# Patient Record
Sex: MALE | Race: BLACK OR AFRICAN AMERICAN | Employment: FULL TIME | URBAN - METROPOLITAN AREA
[De-identification: names, ages, dates, MRNs, and addresses within clinical notes are randomized per-mention and may not be internally consistent; named-entity substitution may affect disease eponyms.]

---

## 2017-01-16 ENCOUNTER — HOSPITAL ENCOUNTER (OUTPATIENT)
Dept: GENERAL RADIOLOGY | Age: 57
Discharge: HOME | End: 2017-01-16
Payer: COMMERCIAL

## 2017-01-16 DIAGNOSIS — M25.559 PAIN IN JOINT, PELVIC REGION AND THIGH: ICD-10-CM

## 2017-01-16 DIAGNOSIS — R91.1 LUNG NODULE: ICD-10-CM

## 2017-01-16 PROCEDURE — 71020 XR CHEST PA LAT: CPT

## 2017-01-16 PROCEDURE — 72170 X-RAY EXAM OF PELVIS: CPT

## 2017-02-13 ENCOUNTER — OFFICE VISIT (OUTPATIENT)
Dept: CARDIOLOGY CLINIC | Age: 57
End: 2017-02-13

## 2017-02-13 VITALS
HEART RATE: 63 BPM | SYSTOLIC BLOOD PRESSURE: 110 MMHG | DIASTOLIC BLOOD PRESSURE: 80 MMHG | OXYGEN SATURATION: 98 % | BODY MASS INDEX: 23.99 KG/M2 | HEIGHT: 69 IN | WEIGHT: 162 LBS

## 2017-02-13 DIAGNOSIS — F17.200 TOBACCO USE DISORDER: ICD-10-CM

## 2017-02-13 DIAGNOSIS — F11.10 HEROIN ABUSE (HCC): ICD-10-CM

## 2017-02-13 DIAGNOSIS — I45.10 RBBB: ICD-10-CM

## 2017-02-13 DIAGNOSIS — R00.1 BRADYCARDIA: Primary | ICD-10-CM

## 2017-02-13 DIAGNOSIS — R07.9 CHEST PAIN, UNSPECIFIED: ICD-10-CM

## 2017-02-13 NOTE — PROGRESS NOTES
HISTORY OF PRESENT ILLNESS  Torres Garcia is a 64 y.o. male. HPI  He is referred to my office for the evaluation of bradycardia. He has been asymptomatic with regard to the bradycardia with no significant dizziness or fainting spell. There has been no documented severe bradycardia on his medical records sent from his primary care physicians office. He, however, had an echocardiogram at his primary care physicians office on 12/17/2015, which demonstrated normal LV function with EF in the range of 68%. There was no evidence of significant valvular pathology. Left atrial dimension was normal and so was the right atrial dimension. He is currently an every-day smoker. He does have an active history of substance abuse with heroin in the past thirty-five years. He indicates that he gets nauseated quite often (almost daily) when his heroin level goes down. He has had some chest pain, but he denies exertional pain. He has no history of hypertension or diabetes mellitus. He was seen at [facility] in December of 2015 with heroin withdrawal, at which time, he was found to have some bradycardia. He works at [employer] and he does have good muscles in his arms and chest.                   Review of Systems   Constitutional: Negative for malaise/fatigue and weight loss. HENT: Negative for hearing loss. Eyes: Negative for blurred vision and double vision. Respiratory: Positive for shortness of breath. Cardiovascular: Negative for chest pain, palpitations, orthopnea, claudication, leg swelling and PND. Gastrointestinal: Negative for blood in stool, heartburn and melena. Genitourinary: Negative for dysuria, flank pain, frequency, hematuria and urgency. Musculoskeletal: Negative for back pain and joint pain. Skin: Negative for itching and rash. Neurological: Positive for dizziness and loss of consciousness. Negative for weakness and headaches.    Psychiatric/Behavioral: Negative for depression and memory loss. Physical Exam   Constitutional: He is oriented to person, place, and time. He appears well-developed and well-nourished. HENT:   Head: Normocephalic and atraumatic. Eyes: Conjunctivae are normal. Pupils are equal, round, and reactive to light. Neck: Normal range of motion. Neck supple. No JVD present. Cardiovascular: Normal rate, regular rhythm, S1 normal and S2 normal.   No extrasystoles are present. PMI is not displaced. Exam reveals no friction rub. No murmur heard. Pulses:       Carotid pulses are 3+ on the right side, and 3+ on the left side. Pulmonary/Chest: Effort normal. He has no rales. Abdominal: Soft. There is no tenderness. Musculoskeletal: He exhibits no edema. Neurological: He is alert and oriented to person, place, and time. No cranial nerve deficit. Skin: Skin is warm and dry. Psychiatric: He has a normal mood and affect. His behavior is normal.     Visit Vitals    /80    Pulse 63    Ht 5' 9\" (1.753 m)    Wt 73.5 kg (162 lb)    SpO2 98%    BMI 23.92 kg/m2       Past Medical History   Diagnosis Date    Acid indigestion     Anemia NEC     Back pain     Cardiomegaly      1/12 LVH on EKG    Chest pain, unspecified     Drug use     Heroin causing adverse effect in therapeutic use     Irregular heart beat     Nonspecific abnormal electrocardiogram (ECG) (EKG)      S [place], RBBB    Pulmonary nodule     Tachycardia     Tightness in chest     Tiredness     Tobacco use disorder        Social History     Social History    Marital status: SINGLE     Spouse name: N/A    Number of children: N/A    Years of education: N/A     Occupational History    Not on file.      Social History Main Topics    Smoking status: Current Every Day Smoker     Packs/day: 1.00     Years: 34.00     Types: Cigarettes    Smokeless tobacco: Never Used    Alcohol use No      Comment: occasional    Drug use: Yes     Special: Marijuana, Heroin    Sexual activity: Not on file     Other Topics Concern    Not on file     Social History Narrative       Family History   Problem Relation Age of Onset    Diabetes Mother     Hypertension Mother     Thyroid Disease Mother     High Cholesterol Mother     Cancer Father     Heart Disease Father     Hypertension Father     Diabetes Sister     Hypertension Sister     Hypertension Brother     Hypertension Sister     High Cholesterol Sister        Past Surgical History   Procedure Laterality Date    Hx circumcision      Hx other surgical       HX TORSION OF THE TESTES    Hx circumcision      Hx other surgical  2007 approx     lung biopsy       Current Outpatient Prescriptions   Medication Sig Dispense Refill    ibuprofen (MOTRIN) 600 mg tablet Take 1 Tab by mouth every six (6) hours as needed for Pain. 30 Tab 0    buPROPion SR (WELLBUTRIN SR) 150 mg SR tablet 1 tablet daily for 3 days then 1 tablet twice daily 60 Tab 2    nicotine (NICODERM CQ) 21 mg/24 hr 1 Patch by TransDERmal route every twenty-four (24) hours.  sildenafil (REVATIO) 20 mg tablet Take 2-3 tablets at one time one hour prior to intercourse. 90 Tab 3       EKG: normal sinus rhythm, RBBB. ASSESSMENT and PLAN  Encounter Diagnoses   Name Primary?  Bradycardia Yes    Chest pain, unspecified     Tobacco use disorder     Heroin abuse     RBBB    He has no physical findings of structural heart disease and his echocardiogram demonstrated no evidence of structural heart disease. He does have right bundle branch block, which could be congenital or degenerative changes. He seems to have had intermittent sinus bradycardia, which may well be related to a high vagal tone since he has frequent episodes of nausea daily from his heroin addiction and temporary withdrawal.  Clinically, there appears to be no indication for a pacemaker at this time. The patient was given strong advice to stop using heroin and to stop smoking cigarettes.   By smoking cigarettes and using heroin, he will have a high risk for death and this was conveyed to the patient. He was advised to check into the hospital to come off of the heroin to avoid heroin withdrawal.  He has had nonspecific chest pain, but apparently, he has had no chest pain at work with heavy levels of activity.

## 2017-02-13 NOTE — MR AVS SNAPSHOT
Visit Information Date & Time Provider Department Dept. Phone Encounter #  
 2/13/2017  2:40 PM Clare Bustamante MD [facility] [id number] Follow-up Instructions Return if symptoms worsen or fail to improve. Upcoming Health Maintenance Date Due Hepatitis C Screening 1960 Pneumococcal 19-64 Medium Risk (1 of 1 - PPSV23) 12/25/1979 DTaP/Tdap/Td series (1 - Tdap) 12/25/1981 FOBT Q 1 YEAR AGE 50-75 12/25/2010 INFLUENZA AGE 9 TO ADULT 8/1/2016 Allergies as of 2/13/2017  Review Complete On: 2/13/2017 By: Clare Bustamante MD  
 Not on File Current Immunizations  Never Reviewed No immunizations on file. Not reviewed this visit You Were Diagnosed With   
  
 Codes Comments Bradycardia    -  Primary ICD-10-CM: R00.1 ICD-9-CM: 427.89 Vitals BP Pulse Height(growth percentile) Weight(growth percentile) SpO2 BMI  
 110/80 63 5' 9\" (1.753 m) 162 lb (73.5 kg) 98% 23.92 kg/m2 Smoking Status Current Every Day Smoker Vitals History BMI and BSA Data Body Mass Index Body Surface Area  
 23.92 kg/m 2 1.89 m 2 Preferred Pharmacy Pharmacy Name Phone [facility] [address] [place] [address],[facility] [address] [place] [address] [phone] Your Updated Medication List  
  
   
This list is accurate as of: 2/13/17  3:33 PM.  Always use your most recent med list.  
  
  
  
  
 buPROPion  mg SR tablet Commonly known as:  WELLBUTRIN SR  
1 tablet daily for 3 days then 1 tablet twice daily  
  
 ibuprofen 600 mg tablet Commonly known as:  MOTRIN Take 1 Tab by mouth every six (6) hours as needed for Pain. nicotine 21 mg/24 hr  
Commonly known as:  NICODERM CQ  
1 Patch by TransDERmal route every twenty-four (24) hours. sildenafil 20 mg tablet Commonly known as:  REVATIO Take 2-3 tablets at one time one hour prior to intercourse. We Performed the Following AMB POC EKG ROUTINE W/ 12 LEADS, INTER & REP [18895 CPT(R)] Follow-up Instructions Return if symptoms worsen or fail to improve. Introducing [facility]! Dear Alexandra Mccarthy: 
Thank you for requesting a LoveIt account. Our records indicate that you already have an active LoveIt account. You can access your account anytime at [email or web address] Did you know that you can access your hospital and ER discharge instructions at any time in LoveIt? You can also review all of your test results from your hospital stay or ER visit. Additional Information If you have questions, please visit the Frequently Asked Questions section of the LoveIt website at [email or web address] Remember, LoveIt is NOT to be used for urgent needs. For medical emergencies, dial 911. Now available from your iPhone and Android! Please provide this summary of care documentation to your next provider. Your primary care clinician is listed as Luis Manuel Meyers. If you have any questions after today's visit, please call [phone].

## 2017-02-13 NOTE — PROGRESS NOTES
1. Have you been to the ER, urgent care clinic since your last visit? Hospitalized since your last visit? Er 12/11/16 hip pain; ER 2/8/16 chest pain   2. Have you seen or consulted any other health care providers outside of the [address] [place] [address] since your last visit? Include any pap smears or colon screening.   no

## 2018-08-13 ENCOUNTER — HOSPITAL ENCOUNTER (EMERGENCY)
Age: 58
Discharge: HOME | End: 2018-08-14
Attending: EMERGENCY MEDICINE
Payer: COMMERCIAL

## 2018-08-13 VITALS
DIASTOLIC BLOOD PRESSURE: 83 MMHG | TEMPERATURE: 98.5 F | OXYGEN SATURATION: 96 % | RESPIRATION RATE: 18 BRPM | SYSTOLIC BLOOD PRESSURE: 136 MMHG | HEART RATE: 62 BPM

## 2018-08-13 DIAGNOSIS — F19.10 POLYSUBSTANCE ABUSE (HCC): Primary | ICD-10-CM

## 2018-08-13 LAB
ALBUMIN SERPL-MCNC: 2.9 G/DL (ref 3.4–5)
ALBUMIN/GLOB SERPL: 0.7 {RATIO} (ref 0.8–1.7)
ALP SERPL-CCNC: 67 U/L (ref 45–117)
ALT SERPL-CCNC: 27 U/L (ref 16–61)
ANION GAP SERPL CALC-SCNC: 5 MMOL/L (ref 3–18)
AST SERPL-CCNC: 26 U/L (ref 15–37)
BASOPHILS # BLD: 0 K/UL (ref 0–0.1)
BASOPHILS NFR BLD: 0 % (ref 0–2)
BILIRUB SERPL-MCNC: 0.5 MG/DL (ref 0.2–1)
BUN SERPL-MCNC: 12 MG/DL (ref 7–18)
BUN/CREAT SERPL: 8 (ref 12–20)
CALCIUM SERPL-MCNC: 8.4 MG/DL (ref 8.5–10.1)
CHLORIDE SERPL-SCNC: 105 MMOL/L (ref 100–108)
CO2 SERPL-SCNC: 32 MMOL/L (ref 21–32)
CREAT SERPL-MCNC: 1.53 MG/DL (ref 0.6–1.3)
DIFFERENTIAL METHOD BLD: ABNORMAL
EOSINOPHIL # BLD: 0.1 K/UL (ref 0–0.4)
EOSINOPHIL NFR BLD: 1 % (ref 0–5)
ERYTHROCYTE [DISTWIDTH] IN BLOOD BY AUTOMATED COUNT: 14.2 % (ref 11.6–14.5)
ETHANOL SERPL-MCNC: <3 MG/DL (ref 0–3)
GLOBULIN SER CALC-MCNC: 4.1 G/DL (ref 2–4)
GLUCOSE SERPL-MCNC: 105 MG/DL (ref 74–99)
HCT VFR BLD AUTO: 38.2 % (ref 36–48)
HGB BLD-MCNC: 12.2 G/DL (ref 13–16)
LYMPHOCYTES # BLD: 2.4 K/UL (ref 0.9–3.6)
LYMPHOCYTES NFR BLD: 39 % (ref 21–52)
MCH RBC QN AUTO: 26.5 PG (ref 24–34)
MCHC RBC AUTO-ENTMCNC: 31.9 G/DL (ref 31–37)
MCV RBC AUTO: 82.9 FL (ref 74–97)
MONOCYTES # BLD: 0.5 K/UL (ref 0.05–1.2)
MONOCYTES NFR BLD: 8 % (ref 3–10)
NEUTS SEG # BLD: 3.1 K/UL (ref 1.8–8)
NEUTS SEG NFR BLD: 52 % (ref 40–73)
PLATELET # BLD AUTO: 174 K/UL (ref 135–420)
PMV BLD AUTO: 12.1 FL (ref 9.2–11.8)
POTASSIUM SERPL-SCNC: 3.9 MMOL/L (ref 3.5–5.5)
PROT SERPL-MCNC: 7 G/DL (ref 6.4–8.2)
RBC # BLD AUTO: 4.61 M/UL (ref 4.7–5.5)
SODIUM SERPL-SCNC: 142 MMOL/L (ref 136–145)
WBC # BLD AUTO: 6.1 K/UL (ref 4.6–13.2)

## 2018-08-13 PROCEDURE — 99283 EMERGENCY DEPT VISIT LOW MDM: CPT

## 2018-08-13 PROCEDURE — 80053 COMPREHEN METABOLIC PANEL: CPT | Performed by: EMERGENCY MEDICINE

## 2018-08-13 PROCEDURE — 80307 DRUG TEST PRSMV CHEM ANLYZR: CPT | Performed by: EMERGENCY MEDICINE

## 2018-08-13 PROCEDURE — 85025 COMPLETE CBC W/AUTO DIFF WBC: CPT | Performed by: EMERGENCY MEDICINE

## 2018-08-13 NOTE — Clinical Note
Please follow up with outpatient detox resources as discussed. The evaluation and treatment done today requires that you follow up with a physician for re-evaluation. Medical problems can change over time and symptoms can get worse or new symptoms can  develop over time, therefore, it is important that you follow up as we discussed. Please immediately return to the ER if you have any concerns. Call the ER if you have any questions about what we discussed.

## 2018-08-14 NOTE — BSMART NOTE
Comprehensive Assessment Form Part 1      Section l - Integrated Summary    Summary:  62year old male who presented voluntarily to the ER seeking help with his drug addiction. Interviewed in room 21 @ the request of Dr. Larry Ventura. Patient sitting on ER bed with his common law wife at his side. Dressed in paper scrubs. Alert and oriented. Cooperative with interview. Patient reports he attends a Methadone program in [place] [facility] [place] [facility]. Taking 60 mg of Methadone daily. Ting Zavaleta will miss days as unable to afford, will then use a cap to cap and a half of heroin. Also admits to daily crack cocaine usage. Stated he had thoughts to take his life by doing drugs to excess. Currently shahzad for safety and is seeking information for Rehab treatment. Denied prior inpatient or outpatient treatment. Reports a decreased appetite over the past 2-3 months and weight loss of 20-30 pounds. Section ll - Disposition      The Medical Doctor to Psychiatrist conference was not completed. The Medical Doctor is in agreement with Psychiatrist disposition because of (reason) patient is denying current suicidal ideations, seeking information for Rehab treatment. .  The plan is discussed with Dr. Larry Ventura plan to discharge from the ER. Referred to outpatient given information packet.     Adela Bacon RN

## 2018-08-14 NOTE — DISCHARGE INSTRUCTIONS
Alcohol and Drug Problems: Care Instructions  Your Care Instructions    You can improve your life and health by stopping your use of alcohol or drugs. Ending dependency on alcohol or drugs may help you feel and sleep better. You may get along better with your family, friends, and coworkers. There are medicines and programs that can help. Follow-up care is a key part of your treatment and safety. Be sure to make and go to all appointments, and call your doctor if you are having problems. It's also a good idea to know your test results and keep a list of the medicines you take. How can you care for yourself at home? · If you have been given medicine to help keep you sober or reduce your cravings, be sure to take it as prescribed. · Talk to your doctor about programs that can help you stop using drugs or drinking alcohol. · If your doctor prescribes disulfiram (Antabuse), do not drink any alcohol while you are taking this medicine. You may have severe, even life-threatening, side effects from even small amounts of alcohol. · Do not tempt yourself by keeping alcohol or drugs in your home. · Learn how to say no when other people drink or use drugs. Or don't spend time with people who drink or use drugs. · Use the time and money spent on drinking or drugs to do something fun with your family or friends. Preventing a relapse  · Do not drink alcohol or use drugs at all. Using any amount of alcohol or drugs greatly increases your risk for relapse. · Seek help from organizations such as Alcoholics Anonymous, Narcotics Anonymous, or treatment facilities if you feel the need to drink alcohol or use drugs again. · Remember that recovery is a lifelong process. · Stay away from situations, friends, or places that may lead you to drink or use drugs. · Have a plan to spot and deal with relapse. Learn to recognize changes in your thinking that lead you to drink or use drugs. These are warning signs.  Get help before you start to drink or use drugs again. · Get help as soon as you can if you relapse. Some people make a plan with another person that outlines what they want that person to do for them if they relapse. The plan usually includes how to handle the relapse and who to notify in case of relapse. · Don't give up. Remember that a relapse does not mean that you have failed. Use the experience to learn the triggers that lead you to drink or use drugs. Then quit again. Many people have several relapses before they are able to quit for good. When should you call for help? Call 911 anytime you think you may need emergency care. For example, call if:    · You feel you cannot stop from hurting yourself or someone else.   [facility] your doctor now or seek immediate medical care if:    · You have serious withdrawal symptoms such as confusion, hallucinations, or severe trembling.    Watch closely for changes in your health, and be sure to contact your doctor if:    · You have a relapse.     · You need more help or support to stop. Where can you learn more? Go to http://kelly-james.info/. Enter 296-6935933 in the search box to learn more about \"Alcohol and Drug Problems: Care Instructions. \"  Current as of: October 9, 2017  Content Version: 11.7  © 6179-9788 [employer]. Care instructions adapted under license by [employer] (which disclaims liability or warranty for this information). If you have questions about a medical condition or this instruction, always ask your healthcare professional. [place] [address] any warranty or liability for your use of this information.

## 2018-08-14 NOTE — ED TRIAGE NOTES
Patient A/O x 4, complaining of having suicidal thoughts. Patient states he's also here for detox from heroine and crack cocaine. Patient states his last use of both drugs was today. Patient states his plan would be to keep taking heroine and crack. Denies HI.

## 2018-08-14 NOTE — ED PROVIDER NOTES
EMERGENCY DEPARTMENT HISTORY AND PHYSICAL EXAM    9:44 PM      Date: 8/13/2018  Patient Name: Aaron Parent    History of Presenting Illness     Chief Complaint   Patient presents with   [address] Problem       History Provided By: Patient    Chief Complaint: Detox  Duration:  Today  Timing:  Acute  Location: Endocrine/Psych  Quality: Not reported  Severity: Moderate  Modifying Factors: No modifying or aggravating factors were reported. Associated Symptoms: Depression      Additional History (Context): Aaron Parent is a 62 y.o. male with PMHx of drug use, tobacco use disorder, irregular heart beat and cardiomegaly who presents to the ED with request for acute detox today. Patient admits he has been been using cocaine and ~ 1 cap of heroin, last use today. States he is \"tired\" of spending money, \"I was doing the same thing a couple days ago, I'm just tired of it. \" Associated symptoms include depression. Denies a recent increase in stress. Claims he has a plan to end his life by ingesting drugs. Denies HI or A/V hallucinations. Patient reports he is on Methadone, took 60 mg this morning. No modifying or aggravating factors were reported. No other symptoms or concerns were expressed. PCP: Mckenna Almonte MD    Current Outpatient Prescriptions   Medication Sig Dispense Refill    ibuprofen (MOTRIN) 600 mg tablet Take 1 Tab by mouth every six (6) hours as needed for Pain. 30 Tab 0    buPROPion SR (WELLBUTRIN SR) 150 mg SR tablet 1 tablet daily for 3 days then 1 tablet twice daily 60 Tab 2    nicotine (NICODERM CQ) 21 mg/24 hr 1 Patch by TransDERmal route every twenty-four (24) hours.  sildenafil (REVATIO) 20 mg tablet Take 2-3 tablets at one time one hour prior to intercourse.  90 Tab 3       Past History     Past Medical History:  Past Medical History:   Diagnosis Date    Acid indigestion     Anemia NEC     Back pain     Cardiomegaly     1/12 LVH on EKG    Chest pain, unspecified     Drug use     Heroin causing adverse effect in therapeutic use     Irregular heart beat     Nonspecific abnormal electrocardiogram (ECG) (EKG)     S Ronak, RBBB    Pulmonary nodule     Tachycardia     Tightness in chest     Tiredness     Tobacco use disorder        Past Surgical History:  Past Surgical History:   Procedure Laterality Date    HX CIRCUMCISION      HX CIRCUMCISION      HX OTHER SURGICAL      HX TORSION OF THE TESTES    HX OTHER SURGICAL  2007 approx    lung biopsy       Family History:  Family History   Problem Relation Age of Onset    Diabetes Mother     Hypertension Mother     Thyroid Disease Mother     High Cholesterol Mother     Cancer Father     Heart Disease Father     Hypertension Father     Diabetes Sister     Hypertension Sister     Hypertension Brother     Hypertension Sister     High Cholesterol Sister        Social History:  Social History   Substance Use Topics    Smoking status: Current Every Day Smoker     Packs/day: 1.00     Years: 34.00     Types: Cigarettes    Smokeless tobacco: Never Used    Alcohol use No      Comment: occasional       Allergies:  No Known Allergies      Review of Systems       Review of Systems   Constitutional: Negative for fever. Endocrine:        Positive for detox   Psychiatric/Behavioral: Positive for dysphoric mood and suicidal ideas. Negative for hallucinations. All other systems reviewed and are negative. Physical Exam     Patient Vitals for the past 12 hrs:   Temp Pulse Resp BP SpO2   08/13/18 2119 98.5 °F (36.9 °C) 62 18 136/83 96 %       Physical Exam   Constitutional: He is oriented to person, place, and time. He appears well-developed. HENT:   Head: Normocephalic and atraumatic. Eyes: Conjunctivae and EOM are normal.   Neck: Normal range of motion. Cardiovascular: Normal heart sounds. Exam reveals no gallop and no friction rub. No murmur heard.   Pulmonary/Chest: Effort normal and breath sounds normal. No stridor. Abdominal: Soft. There is no tenderness. Musculoskeletal: Normal range of motion. He exhibits no tenderness. Neurological: He is alert and oriented to person, place, and time. Skin: Skin is warm and dry. He is not diaphoretic. Psychiatric: His behavior is normal. He exhibits a depressed mood. Nursing note and vitals reviewed. Diagnostic Study Results     Labs -  Recent Results (from the past 12 hour(s))   CBC WITH AUTOMATED DIFF    Collection Time: 08/13/18  9:38 PM   Result Value Ref Range    WBC 6.1 4.6 - 13.2 K/uL    RBC 4.61 (L) 4.70 - 5.50 M/uL    HGB 12.2 (L) 13.0 - 16.0 g/dL    HCT 38.2 36.0 - 48.0 %    MCV 82.9 74.0 - 97.0 FL    MCH 26.5 24.0 - 34.0 PG    MCHC 31.9 31.0 - 37.0 g/dL    RDW 14.2 11.6 - 14.5 %    PLATELET 050 714 - 455 K/uL    MPV 12.1 (H) 9.2 - 11.8 FL    NEUTROPHILS 52 40 - 73 %    LYMPHOCYTES 39 21 - 52 %    MONOCYTES 8 3 - 10 %    EOSINOPHILS 1 0 - 5 %    BASOPHILS 0 0 - 2 %    ABS. NEUTROPHILS 3.1 1.8 - 8.0 K/UL    ABS. LYMPHOCYTES 2.4 0.9 - 3.6 K/UL    ABS. MONOCYTES 0.5 0.05 - 1.2 K/UL    ABS. EOSINOPHILS 0.1 0.0 - 0.4 K/UL    ABS. BASOPHILS 0.0 0.0 - 0.1 K/UL    DF AUTOMATED     METABOLIC PANEL, COMPREHENSIVE    Collection Time: 08/13/18  9:38 PM   Result Value Ref Range    Sodium 142 136 - 145 mmol/L    Potassium 3.9 3.5 - 5.5 mmol/L    Chloride 105 100 - 108 mmol/L    CO2 32 21 - 32 mmol/L    Anion gap 5 3.0 - 18 mmol/L    Glucose 105 (H) 74 - 99 mg/dL    BUN 12 7.0 - 18 MG/DL    Creatinine 1.53 (H) 0.6 - 1.3 MG/DL    BUN/Creatinine ratio 8 (L) 12 - 20      GFR est AA 57 (L) >60 ml/min/1.73m2    GFR est non-AA 47 (L) >60 ml/min/1.73m2    Calcium 8.4 (L) 8.5 - 10.1 MG/DL    Bilirubin, total 0.5 0.2 - 1.0 MG/DL    ALT (SGPT) 27 16 - 61 U/L    AST (SGOT) 26 15 - 37 U/L    Alk.  phosphatase 67 45 - 117 U/L    Protein, total 7.0 6.4 - 8.2 g/dL    Albumin 2.9 (L) 3.4 - 5.0 g/dL    Globulin 4.1 (H) 2.0 - 4.0 g/dL    A-G Ratio 0.7 (L) 0.8 - 1.7     ETHYL ALCOHOL Collection Time: 08/13/18  9:38 PM   Result Value Ref Range    ALCOHOL(ETHYL),SERUM <3 0 - 3 MG/DL     Medical Decision Making     Provider Notes (Medical Decision Making): pt presenting requesting detox. He is medically cleared and I have consulted Mattie for additional recommendations. They have cleared him for outpt management as he currently does not have any inpt criteria. He has signed a safety contract with them, and accepts outpt resources for detox from crisis. -They verbally convey understanding and agreement of the signs, symptoms, diagnosis, treatment and prognosis and additionally agree to follow up as discussed. They understand that there are limitations to any evaluation, and that should the symptoms worsen or change that they need to return for further evaluation. All questions were answered, and we reviewed pertinent return precautions as seen in the discharge paperwork. Pt understood follow up instructions, and would return to the ED if any worsening or concerns. Clark Lema MD  11:23 PM      I am the first provider for this patient. I reviewed the vital signs, available nursing notes, past medical history, past surgical history, family history and social history. Vital Signs-Reviewed the patient's vital signs. Pulse Oximetry Analysis -  96% on room air, stable    Cardiac Monitor:  Rate: 62  Rhythm:  Normal Sinus Rhythm     Records Reviewed: Nursing Notes and Old Medical Records (Time of Review: 9:44 PM)    ED Course: Progress Notes, Reevaluation, and Consults:  Consult:  Discussed care with mattie Keyes. Standard discussion; including history of patients chief complaint, available diagnostic results, and treatment course. Will evaluate patient. 10:18 PM, 8/13/2018     Consult:  Discussed care with mattie Keyes. Standard discussion; including history of patients chief complaint, available diagnostic results, and treatment course. Evaluated patient, cleared for discharge.  Gave patient outpatient resources for detox. 10:58 PM, 8/13/2018     Diagnosis     Clinical Impression:   1. Polysubstance abuse        Disposition: Discharge. Follow-up Information     Follow up With Details Comments [address], [place] In 2 days For Emergency Department Follow-Up [address] [facility] [address]  [phone]      [facility] EMERGENCY DEPT  As needed, If symptoms worsen [address] [place] [address]  [phone]           Patient's Medications   Start Taking    No medications on file   Continue Taking    BUPROPION SR (WELLBUTRIN SR) 150 MG SR TABLET    1 tablet daily for 3 days then 1 tablet twice daily    IBUPROFEN (MOTRIN) 600 MG TABLET    Take 1 Tab by mouth every six (6) hours as needed for Pain. NICOTINE (NICODERM CQ) 21 MG/24 HR    1 Patch by TransDERmal route every twenty-four (24) hours. SILDENAFIL (REVATIO) 20 MG TABLET    Take 2-3 tablets at one time one hour prior to intercourse. These Medications have changed    No medications on file   Stop Taking    No medications on file     _______________________________    Attestations:  Ellie Johnson acting as a scribe for and in the presence of Dwight Shane MD      August 13, 2018 at 9:44 PM       Provider Attestation:      I personally performed the services described in the documentation, reviewed the documentation, as recorded by the scribe in my presence, and it accurately and completely records my words and actions.  August 13, 2018 at 9:44 PM - Dwight Shane MD    _______________________________

## 2018-08-14 NOTE — ED NOTES
Patient provided with patient scrubs and socks, awaiting patient to change to place in locker. Patient's spouse at bedside. Patient also provided with water, awaiting urine specimen.

## 2018-08-14 NOTE — ED NOTES
I have reviewed discharge instructions with the patient. The patient verbalized understanding. Patient looks comfortable, left ED in stable condition. Ambulatory, steady gait noted.

## 2018-09-04 PROCEDURE — 99283 EMERGENCY DEPT VISIT LOW MDM: CPT

## 2018-09-05 ENCOUNTER — HOSPITAL ENCOUNTER (EMERGENCY)
Age: 58
Discharge: HOME | End: 2018-09-05
Attending: EMERGENCY MEDICINE | Admitting: EMERGENCY MEDICINE
Payer: COMMERCIAL

## 2018-09-05 VITALS
DIASTOLIC BLOOD PRESSURE: 68 MMHG | HEIGHT: 69 IN | RESPIRATION RATE: 16 BRPM | OXYGEN SATURATION: 100 % | BODY MASS INDEX: 25.18 KG/M2 | TEMPERATURE: 98.3 F | WEIGHT: 170 LBS | HEART RATE: 51 BPM | SYSTOLIC BLOOD PRESSURE: 105 MMHG
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
## 2018-09-05 DIAGNOSIS — S29.012A MUSCLE STRAIN OF LEFT UPPER BACK, INITIAL ENCOUNTER: Primary | ICD-10-CM

## 2018-09-05 PROCEDURE — 74011250637 HC RX REV CODE- 250/637: Performed by: EMERGENCY MEDICINE

## 2018-09-05 RX ORDER — LIDOCAINE 4 G/100G
PATCH TOPICAL
Qty: 3 PACKAGE | Refills: 0 | Status: SHIPPED | OUTPATIENT
Start: 2018-09-05 | End: 2018-12-18

## 2018-09-05 RX ORDER — DIAZEPAM 10 MG/1
10 TABLET ORAL EVERY 8 HOURS
Qty: 10 TAB | Refills: 0 | Status: SHIPPED | OUTPATIENT
Start: 2018-09-05 | End: 2018-09-10

## 2018-09-05 RX ORDER — IBUPROFEN 800 MG/1
800 TABLET ORAL EVERY 8 HOURS
Qty: 15 TAB | Refills: 0 | Status: SHIPPED | OUTPATIENT
Start: 2018-09-05 | End: 2018-09-10

## 2018-09-05 RX ORDER — IBUPROFEN 400 MG/1
800 TABLET ORAL
Status: COMPLETED | OUTPATIENT
Start: 2018-09-05 | End: 2018-09-05

## 2018-09-05 RX ADMIN — IBUPROFEN 800 MG: 400 TABLET ORAL at 04:43
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
## 2018-12-15 ENCOUNTER — APPOINTMENT (OUTPATIENT)
Dept: GENERAL RADIOLOGY | Age: 58
End: 2018-12-15
Attending: EMERGENCY MEDICINE
Payer: COMMERCIAL

## 2018-12-15 ENCOUNTER — HOSPITAL ENCOUNTER (EMERGENCY)
Age: 58
Discharge: HOME | End: 2018-12-16
Attending: EMERGENCY MEDICINE | Admitting: EMERGENCY MEDICINE
Payer: COMMERCIAL

## 2018-12-15 DIAGNOSIS — R00.1 BRADYCARDIA: Primary | ICD-10-CM

## 2018-12-15 DIAGNOSIS — G89.29 CHRONIC MIDLINE LOW BACK PAIN WITHOUT SCIATICA: ICD-10-CM

## 2018-12-15 DIAGNOSIS — M54.50 CHRONIC MIDLINE LOW BACK PAIN WITHOUT SCIATICA: ICD-10-CM

## 2018-12-15 LAB
ANION GAP SERPL CALC-SCNC: 4 MMOL/L (ref 3–18)
BASOPHILS # BLD: 0 K/UL (ref 0–0.1)
BASOPHILS NFR BLD: 0 % (ref 0–2)
BUN SERPL-MCNC: 17 MG/DL (ref 7–18)
BUN/CREAT SERPL: 14 (ref 12–20)
CALCIUM SERPL-MCNC: 8.4 MG/DL (ref 8.5–10.1)
CHLORIDE SERPL-SCNC: 108 MMOL/L (ref 100–108)
CK MB CFR SERPL CALC: 0.8 % (ref 0–4)
CK MB SERPL-MCNC: 1.6 NG/ML (ref 5–25)
CK SERPL-CCNC: 193 U/L (ref 39–308)
CO2 SERPL-SCNC: 29 MMOL/L (ref 21–32)
CREAT SERPL-MCNC: 1.18 MG/DL (ref 0.6–1.3)
DIFFERENTIAL METHOD BLD: ABNORMAL
EOSINOPHIL # BLD: 0 K/UL (ref 0–0.4)
EOSINOPHIL NFR BLD: 0 % (ref 0–5)
ERYTHROCYTE [DISTWIDTH] IN BLOOD BY AUTOMATED COUNT: 14.5 % (ref 11.6–14.5)
GLUCOSE SERPL-MCNC: 109 MG/DL (ref 74–99)
HCT VFR BLD AUTO: 41.7 % (ref 36–48)
HGB BLD-MCNC: 13.2 G/DL (ref 13–16)
LYMPHOCYTES # BLD: 1.7 K/UL (ref 0.9–3.6)
LYMPHOCYTES NFR BLD: 24 % (ref 21–52)
MCH RBC QN AUTO: 26.6 PG (ref 24–34)
MCHC RBC AUTO-ENTMCNC: 31.7 G/DL (ref 31–37)
MCV RBC AUTO: 83.9 FL (ref 74–97)
MONOCYTES # BLD: 0.4 K/UL (ref 0.05–1.2)
MONOCYTES NFR BLD: 6 % (ref 3–10)
NEUTS SEG # BLD: 5.2 K/UL (ref 1.8–8)
NEUTS SEG NFR BLD: 70 % (ref 40–73)
PLATELET # BLD AUTO: 192 K/UL (ref 135–420)
PMV BLD AUTO: 12.9 FL (ref 9.2–11.8)
POTASSIUM SERPL-SCNC: 4.2 MMOL/L (ref 3.5–5.5)
RBC # BLD AUTO: 4.97 M/UL (ref 4.7–5.5)
SODIUM SERPL-SCNC: 141 MMOL/L (ref 136–145)
TROPONIN I SERPL-MCNC: <0.02 NG/ML (ref 0–0.04)
TSH SERPL DL<=0.05 MIU/L-ACNC: 0.45 UIU/ML (ref 0.36–3.74)
WBC # BLD AUTO: 7.3 K/UL (ref 4.6–13.2)

## 2018-12-15 PROCEDURE — 84443 ASSAY THYROID STIM HORMONE: CPT

## 2018-12-15 PROCEDURE — 80048 BASIC METABOLIC PNL TOTAL CA: CPT

## 2018-12-15 PROCEDURE — 74011000250 HC RX REV CODE- 250: Performed by: EMERGENCY MEDICINE

## 2018-12-15 PROCEDURE — 74011250637 HC RX REV CODE- 250/637: Performed by: EMERGENCY MEDICINE

## 2018-12-15 PROCEDURE — 93005 ELECTROCARDIOGRAM TRACING: CPT

## 2018-12-15 PROCEDURE — 85025 COMPLETE CBC W/AUTO DIFF WBC: CPT

## 2018-12-15 PROCEDURE — 71045 X-RAY EXAM CHEST 1 VIEW: CPT

## 2018-12-15 PROCEDURE — 99285 EMERGENCY DEPT VISIT HI MDM: CPT

## 2018-12-15 PROCEDURE — 82553 CREATINE MB FRACTION: CPT

## 2018-12-15 RX ORDER — MAG HYDROX/ALUMINUM HYD/SIMETH 200-200-20
30 SUSPENSION, ORAL (FINAL DOSE FORM) ORAL
Status: DISCONTINUED | OUTPATIENT
Start: 2018-12-15 | End: 2018-12-16 | Stop reason: HOSPADM

## 2018-12-15 RX ORDER — FAMOTIDINE 20 MG/1
20 TABLET, FILM COATED ORAL
Status: COMPLETED | OUTPATIENT
Start: 2018-12-15 | End: 2018-12-15

## 2018-12-15 RX ORDER — ACETAMINOPHEN 500 MG
1000 TABLET ORAL ONCE
Status: COMPLETED | OUTPATIENT
Start: 2018-12-15 | End: 2018-12-15

## 2018-12-15 RX ORDER — METHOCARBAMOL 750 MG/1
1500 TABLET, FILM COATED ORAL
Qty: 10 TAB | Refills: 0 | Status: SHIPPED | OUTPATIENT
Start: 2018-12-15 | End: 2018-12-28

## 2018-12-15 RX ORDER — LIDOCAINE HYDROCHLORIDE 20 MG/ML
15 SOLUTION OROPHARYNGEAL ONCE
Status: COMPLETED | OUTPATIENT
Start: 2018-12-15 | End: 2018-12-15

## 2018-12-15 RX ORDER — LIDOCAINE 50 MG/G
PATCH TOPICAL
Qty: 30 EACH | Refills: 0 | Status: SHIPPED | OUTPATIENT
Start: 2018-12-15 | End: 2018-12-18

## 2018-12-15 RX ADMIN — FAMOTIDINE 20 MG: 20 TABLET ORAL at 22:28

## 2018-12-15 RX ADMIN — LIDOCAINE HYDROCHLORIDE 15 ML: 20 SOLUTION ORAL; TOPICAL at 22:29

## 2018-12-15 RX ADMIN — ALUMINUM HYDROXIDE, MAGNESIUM HYDROXIDE, AND SIMETHICONE 30 ML: 200; 200; 20 SUSPENSION ORAL at 22:29

## 2018-12-15 RX ADMIN — ACETAMINOPHEN 1000 MG: 500 TABLET ORAL at 22:28

## 2018-12-16 VITALS
HEART RATE: 44 BPM | SYSTOLIC BLOOD PRESSURE: 114 MMHG | TEMPERATURE: 98 F | OXYGEN SATURATION: 97 % | BODY MASS INDEX: 24.73 KG/M2 | HEIGHT: 69 IN | RESPIRATION RATE: 14 BRPM | WEIGHT: 167 LBS | DIASTOLIC BLOOD PRESSURE: 78 MMHG

## 2018-12-16 LAB
ATRIAL RATE: 40 BPM
CALCULATED P AXIS, ECG09: 47 DEGREES
CALCULATED R AXIS, ECG10: -29 DEGREES
CALCULATED T AXIS, ECG11: 26 DEGREES
DIAGNOSIS, 93000: NORMAL
P-R INTERVAL, ECG05: 164 MS
Q-T INTERVAL, ECG07: 508 MS
QRS DURATION, ECG06: 148 MS
QTC CALCULATION (BEZET), ECG08: 414 MS
VENTRICULAR RATE, ECG03: 40 BPM

## 2018-12-16 NOTE — DISCHARGE INSTRUCTIONS
Learning About Relief for Back Pain  What is back tension and strain? Back strain happens when you overstretch, or pull, a muscle in your back. You may hurt your back in an accident or when you exercise or lift something. Most back pain will get better with rest and time. You can take care of yourself at home to help your back heal.  What can you do first to relieve back pain? When you first feel back pain, try these steps:  · Walk. Take a short walk (10 to 20 minutes) on a level surface (no slopes, hills, or stairs) every 2 to 3 hours. Walk only distances you can manage without pain, especially leg pain. · Relax. Find a comfortable position for rest. Some people are comfortable on the floor or a medium-firm bed with a small pillow under their head and another under their knees. Some people prefer to lie on their side with a pillow between their knees. Don't stay in one position for too long. · Try heat or ice. Try using a heating pad on a low or medium setting, or take a warm shower, for 15 to 20 minutes every 2 to 3 hours. Or you can buy single-use heat wraps that last up to 8 hours. You can also try an ice pack for 10 to 15 minutes every 2 to 3 hours. You can use an ice pack or a bag of frozen vegetables wrapped in a thin towel. There is not strong evidence that either heat or ice will help, but you can try them to see if they help. You may also want to try switching between heat and cold. · Take pain medicine exactly as directed. ¨ If the doctor gave you a prescription medicine for pain, take it as prescribed. ¨ If you are not taking a prescription pain medicine, ask your doctor if you can take an over-the-counter medicine. What else can you do? · Stretch and exercise. Exercises that increase flexibility may relieve your pain and make it easier for your muscles to keep your spine in a good, neutral position. And don't forget to keep walking. · Do self-massage.  You can use self-massage to unwind after work or school or to energize yourself in the morning. You can easily massage your feet, hands, or neck. Self-massage works best if you are in comfortable clothes and are sitting or lying in a comfortable position. Use oil or lotion to massage bare skin. · Reduce stress. Back pain can lead to a vicious Lime: Distress about the pain tenses the muscles in your back, which in turn causes more pain. Learn how to relax your mind and your muscles to lower your stress. Where can you learn more? Go to http://kelly-james.info/. Enter X120 in the search box to learn more about \"Learning About Relief for Back Pain. \"  Current as of: March 21, 2017  Content Version: 11.5  © 5114-1586 [employer]. Care instructions adapted under license by [employer] (which disclaims liability or warranty for this information). If you have questions about a medical condition or this instruction, always ask your healthcare professional. [place] [address] any warranty or liability for your use of this information.

## 2018-12-16 NOTE — ED NOTES
I have reviewed discharge instructions with the patient. The patient verbalized understanding. Discharge medications reviewed with patient and appropriate educational materials and side effects teaching were provided. Pt states that he is feeling better. Pt ambulated out of ED with steady gait, accompanied by family member.

## 2018-12-16 NOTE — ED PROVIDER NOTES
EMERGENCY DEPARTMENT HISTORY AND PHYSICAL EXAM    9:35 PM      Date: 12/15/2018  Patient Name: Deana Rodriguez    History of Presenting Illness     Chief Complaint   Patient presents with    Slow Heart Rate    Back Pain         History Provided By: Patient      Additional History (Context): Deana Rodriguez is a 62 y.o. male with back pain, tachycardia, irregular heart beat, cardiomegaly who presents with c/o bradycardia and [facility] that began this morning and confirms abd feeling of bloating but denies any dysuria, constipation, numbness, tingling, CP, dizziness, lethargy or a hx of bradycardia. He is a marine installator as occupation and states job sometimes causes back pain. He confirms tobacco use, denies etOH use or recent illicit drug/ IV drug use and is on methadone currently. No other concerns or symptoms at this time. PCP: Gurinder Avalos MD    Current Facility-Administered Medications   Medication Dose Route Frequency Provider Last Rate Last Dose    alum-mag hydroxide-simeth (MYLANTA) oral suspension 30 mL  30 mL Oral Q4H PRN Moises Guthrie MD   30 mL at 12/15/18 2226     Current Outpatient Medications   Medication Sig Dispense Refill    methocarbamol (ROBAXIN-750) 750 mg tablet Take 2 Tabs by mouth nightly as needed. 10 Tab 0    lidocaine (LIDODERM) 5 % Apply patch to the affected area for 12 hours a day and remove for 12 hours a day. 30 Each 0    lidocaine (SALONPAS/ASPERCREME) 4 % patch Apply every 12 hours as needed for pain control 3 Package 0    ibuprofen (MOTRIN) 600 mg tablet Take 1 Tab by mouth every six (6) hours as needed for Pain. 30 Tab 0    buPROPion SR (WELLBUTRIN SR) 150 mg SR tablet 1 tablet daily for 3 days then 1 tablet twice daily 60 Tab 2    nicotine (NICODERM CQ) 21 mg/24 hr 1 Patch by TransDERmal route every twenty-four (24) hours.       sildenafil (REVATIO) 20 mg tablet Take 2-3 tablets at one time one hour prior to intercourse. 80 Tab 3       Past History     Past Medical History:  Past Medical History:   Diagnosis Date    Acid indigestion     Anemia NEC     Back pain     Cardiomegaly     1/12 LVH on EKG    Chest pain, unspecified     Drug use     Heroin causing adverse effect in therapeutic use     Irregular heart beat     Nonspecific abnormal electrocardiogram (ECG) (EKG)     S Ronak, RBBB    Pulmonary nodule     Tachycardia     Tightness in chest     Tiredness     Tobacco use disorder        Past Surgical History:  Past Surgical History:   Procedure Laterality Date    HX CIRCUMCISION      HX CIRCUMCISION      HX OTHER SURGICAL      HX TORSION OF THE TESTES    HX OTHER SURGICAL  2007 approx    lung biopsy       Family History:  Family History   Problem Relation Age of Onset    Diabetes Mother     Hypertension Mother     Thyroid Disease Mother     High Cholesterol Mother     Cancer Father     Heart Disease Father     Hypertension Father     Diabetes Sister     Hypertension Sister     Hypertension Brother     Hypertension Sister     High Cholesterol Sister        Social History:  Social History     Tobacco Use    Smoking status: Current Every Day Smoker     Packs/day: 1.00     Years: 34.00     Pack years: 34.00     Types: Cigarettes    Smokeless tobacco: Never Used   Substance Use Topics    Alcohol use: No     Alcohol/week: 0.5 oz     Types: 1 Cans of beer per week     Comment: occasional    Drug use: Yes     Types: Marijuana, Heroin       Allergies:  No Known Allergies      Review of Systems       Review of Systems   Constitutional: Negative. Negative for activity change, chills, fatigue and fever. HENT: Negative. Negative for ear pain, hearing loss and sore throat. Eyes: Negative. Negative for pain and visual disturbance. Respiratory: Negative. Negative for cough, chest tightness and shortness of breath.     Cardiovascular: Negative for chest pain and leg swelling. Bradycardia     Gastrointestinal: Positive for abdominal pain (\"feels bloated\"). Negative for abdominal distention and constipation. Genitourinary: Negative. Negative for dysuria and hematuria. Musculoskeletal: Positive for back pain. Skin: Negative. Negative for rash. Neurological: Negative. Negative for dizziness, weakness, numbness and headaches. Psychiatric/Behavioral: Negative. Negative for agitation and behavioral problems. All other systems reviewed and are negative. Physical Exam     Visit Vitals  /75 (BP 1 Location: Left arm)   Pulse (!) 46   Temp 98 °F (36.7 °C)   Resp 16   Ht 5' 9\" (1.753 m)   Wt 75.8 kg (167 lb)   SpO2 100%   BMI 24.66 kg/m²         Physical Exam   Constitutional: He is oriented to person, place, and time. He appears well-developed and well-nourished. HENT:   Head: Normocephalic and atraumatic. Eyes: EOM are normal. Pupils are equal, round, and reactive to light. Right eye exhibits no discharge. Left eye exhibits no discharge. Neck: Normal range of motion. Neck supple. No JVD present. No tracheal deviation present. Cardiovascular: Regular rhythm and normal heart sounds. Bradycardia present. No murmur heard. Pulmonary/Chest: Effort normal and breath sounds normal. No respiratory distress. He has no wheezes. He has no rales. Abdominal: Soft. Bowel sounds are normal. He exhibits no distension. There is tenderness (mild TTP) in the epigastric area. There is no rebound. Musculoskeletal: Normal range of motion. He exhibits no tenderness or deformity. Small cyst to L T-spine, pt states is longstanding. Midline tenderness at approx L1. R paraspinal tenderness in L-spine. Neurological: He is alert and oriented to person, place, and time. No cranial nerve deficit. 5/5 strength UE/LE, 5/5 sensation UE/LE  Sensation intact upper/lower extremities   Skin: Skin is warm and dry. No rash noted. No erythema.    No fluctuance or tenderness to L t-spine cyst   Psychiatric: He has a normal mood and affect. His behavior is normal.   Nursing note and vitals reviewed. Diagnostic Study Results     Labs -  Recent Results (from the past 12 hour(s))   EKG, 12 LEAD, INITIAL    Collection Time: 12/15/18  9:18 PM   Result Value Ref Range    Ventricular Rate 40 BPM    Atrial Rate 40 BPM    P-R Interval 164 ms    QRS Duration 148 ms    Q-T Interval 508 ms    QTC Calculation (Bezet) 414 ms    Calculated P Axis 47 degrees    Calculated R Axis -29 degrees    Calculated T Axis 26 degrees    Diagnosis       Marked sinus bradycardia  Right bundle branch block  Minimal voltage criteria for LVH, may be normal variant  Abnormal ECG  When compared with ECG of 08-FEB-2016 21:05,  No significant change was found     CBC WITH AUTOMATED DIFF    Collection Time: 12/15/18  9:26 PM   Result Value Ref Range    WBC 7.3 4.6 - 13.2 K/uL    RBC 4.97 4.70 - 5.50 M/uL    HGB 13.2 13.0 - 16.0 g/dL    HCT 41.7 36.0 - 48.0 %    MCV 83.9 74.0 - 97.0 FL    MCH 26.6 24.0 - 34.0 PG    MCHC 31.7 31.0 - 37.0 g/dL    RDW 14.5 11.6 - 14.5 %    PLATELET 484 124 - 812 K/uL    MPV 12.9 (H) 9.2 - 11.8 FL    NEUTROPHILS 70 40 - 73 %    LYMPHOCYTES 24 21 - 52 %    MONOCYTES 6 3 - 10 %    EOSINOPHILS 0 0 - 5 %    BASOPHILS 0 0 - 2 %    ABS. NEUTROPHILS 5.2 1.8 - 8.0 K/UL    ABS. LYMPHOCYTES 1.7 0.9 - 3.6 K/UL    ABS. MONOCYTES 0.4 0.05 - 1.2 K/UL    ABS. EOSINOPHILS 0.0 0.0 - 0.4 K/UL    ABS.  BASOPHILS 0.0 0.0 - 0.1 K/UL    DF AUTOMATED     METABOLIC PANEL, BASIC    Collection Time: 12/15/18  9:26 PM   Result Value Ref Range    Sodium 141 136 - 145 mmol/L    Potassium 4.2 3.5 - 5.5 mmol/L    Chloride 108 100 - 108 mmol/L    CO2 29 21 - 32 mmol/L    Anion gap 4 3.0 - 18 mmol/L    Glucose 109 (H) 74 - 99 mg/dL    BUN 17 7.0 - 18 MG/DL    Creatinine 1.18 0.6 - 1.3 MG/DL    BUN/Creatinine ratio 14 12 - 20      GFR est AA >60 >60 ml/min/1.73m2    GFR est non-AA >60 >60 ml/min/1.73m2    Calcium 8.4 (L) 8.5 - 10.1 MG/DL   CARDIAC PANEL,(CK, CKMB & TROPONIN)    Collection Time: 12/15/18  9:26 PM   Result Value Ref Range     39 - 308 U/L    CK - MB 1.6 <3.6 ng/ml    CK-MB Index 0.8 0.0 - 4.0 %    Troponin-I, Qt. <0.02 0.0 - 0.045 NG/ML   TSH 3RD GENERATION    Collection Time: 12/15/18  9:26 PM   Result Value Ref Range    TSH 0.45 0.36 - 3.74 uIU/mL       Radiologic Studies -   XR CHEST PORT   Final Result   Impression:   1. Patchy multifocal opacities could represent multifocal pneumonia and are   most significant in the right lung base. No definite progressive mediastinal   widening allowing for differences in technique. Medical Decision Making   I am the first provider for this patient. I reviewed the vital signs, available nursing notes, past medical history, past surgical history, family history and social history. Vital Signs-Reviewed the patient's vital signs. EKG: Interpreted by the EP. Time Interpreted: 21:21   Rate: 40 BPM   Rhythm: Sinus Bradycardia   Interpretation: Normal intervals. L axis deviation. LVH. No signs of ischemia. Comparison: none    Records Reviewed: Nursing Notes and Old Medical Records        ED Course: Progress Notes, Reevaluation, and Consults: Aortic US completed. Visualized transverse at 2.37 cm. No free fluid noted. Provider Notes (Medical Decision Making):     MDM  Number of Diagnoses or Management Options  Bradycardia:   Chronic midline low back pain without sciatica:   Diagnosis management comments: Diff dx: cauda equina, fx, sprain, chronic back pain, hyperK, med overdose, acs    [facility]aa and bradycardia. Went for back pain, he gets this frequently, does heavy lifting at work, no red flag back pain sx, no ivda, no concern for cauda equina. Will bladder scan regardless for PVR.  Bradycardia is longstanding since oldest ekg's in system, he is in good shape, has seen cards in past for this and never been on meds, BP stable, no symptoms aside from back stiffness. No falls, noc oncern for fractures. Labs wnl, trop neg, US of AAA shows 2.37cm so no concern for dissection or aaa. Abd pain resolved after gi cocktail, he took an advil on an empty stomach after which pain started, no rebound now. Safe for d/c, will give lidoderm, few robaxin    Safe for d/c, careful return precautions given. Pt/family comfortable with plan    Michael Diaz MD        Diagnosis     Clinical Impression:   1. Bradycardia    2. Chronic midline low back pain without sciatica        Disposition: Home    Follow-up Information     Follow up With Specialties Details Why Contact Info    Keyla Newman MD Family Practice In 3 days  [address]  [place] [address]  [phone]      [facility] EMERGENCY DEPT Emergency Medicine  As needed, If symptoms worsen [address]    Martha Neumann MD Cardiology In 1 week re:bradycardia [facility]  [facility]  [place] [address]  [phone]                Medication List      START taking these medications    methocarbamol 750 mg tablet  Commonly known as:  ROBAXIN-750  Take 2 Tabs by mouth nightly as needed. CHANGE how you take these medications    * lidocaine 4 % patch  Apply every 12 hours as needed for pain control  What changed:  Another medication with the same name was added. Make sure you understand how and when to take each. * lidocaine 5 %  Commonly known as:  LIDODERM  Apply patch to the affected area for 12 hours a day and remove for 12 hours a day. What changed: You were already taking a medication with the same name, and this prescription was added. Make sure you understand how and when to take each. * This list has 2 medication(s) that are the same as other medications prescribed for you.  Read the directions carefully, and ask your doctor or other care provider to review them with you. CONTINUE taking these medications    buPROPion  mg SR tablet  Commonly known as:  WELLBUTRIN SR  1 tablet daily for 3 days then 1 tablet twice daily     ibuprofen 600 mg tablet  Commonly known as:  MOTRIN  Take 1 Tab by mouth every six (6) hours as needed for Pain. nicotine 21 mg/24 hr  Commonly known as:  NICODERM CQ     sildenafil (antihypertensive) 20 mg tablet  Commonly known as:  REVATIO  Take 2-3 tablets at one time one hour prior to intercourse. Where to Get Your Medications      Information about where to get these medications is not yet available    Ask your nurse or doctor about these medications  · lidocaine 5 %  · methocarbamol 750 mg tablet       _______________________________    Attestations:  Rachel [address] acting as a scribe for and in the presence of Ferny Hsu MD      December 15, 2018 at 9:35 PM       Provider Attestation:      I personally performed the services described in the documentation, reviewed the documentation, as recorded by the scribe in my presence, and it accurately and completely records my words and actions.  December 15, 2018 at 9:35 PM - Ferny Hsu MD    _______________________________

## 2018-12-18 ENCOUNTER — OFFICE VISIT (OUTPATIENT)
Dept: CARDIOLOGY CLINIC | Age: 58
End: 2018-12-18

## 2018-12-18 VITALS
SYSTOLIC BLOOD PRESSURE: 124 MMHG | HEIGHT: 69 IN | BODY MASS INDEX: 25.48 KG/M2 | OXYGEN SATURATION: 99 % | WEIGHT: 172 LBS | DIASTOLIC BLOOD PRESSURE: 72 MMHG

## 2018-12-18 DIAGNOSIS — R00.1 BRADYCARDIA: Primary | ICD-10-CM

## 2018-12-18 DIAGNOSIS — F17.200 TOBACCO USE DISORDER: ICD-10-CM

## 2018-12-18 DIAGNOSIS — R07.9 CHEST PAIN, UNSPECIFIED TYPE: ICD-10-CM

## 2018-12-18 DIAGNOSIS — F11.10 HEROIN ABUSE (HCC): ICD-10-CM

## 2018-12-18 DIAGNOSIS — I45.10 RBBB: ICD-10-CM

## 2018-12-18 NOTE — PROGRESS NOTES
Cathy Curry presents today for   Chief Complaint   Patient presents with   [address] [place] [address] Dr follow up    Shortness of Breath    Dizziness       Cathy Curry preferred language for health care discussion is english/other. Is someone accompanying this pt? Yes    Is the patient using any DME equipment during OV? no    Depression Screening:  PHQ over the last two weeks 12/18/2018   Little interest or pleasure in doing things Not at all   Feeling down, depressed, irritable, or hopeless Not at all   Total Score PHQ 2 0       Learning Assessment:  Learning Assessment 12/18/2018   PRIMARY LEARNER Patient   HIGHEST LEVEL OF EDUCATION - PRIMARY LEARNER  GRADUATED HIGH SCHOOL OR GED   BARRIERS PRIMARY LEARNER NONE   CO-LEARNER CAREGIVER No   PRIMARY LANGUAGE ENGLISH   LEARNER PREFERENCE PRIMARY DEMONSTRATION   ANSWERED BY Patient   RELATIONSHIP SELF       Abuse Screening:  Abuse Screening Questionnaire 12/18/2018   Do you ever feel afraid of your partner? N   Are you in a relationship with someone who physically or mentally threatens you? N   Is it safe for you to go home? Y       Fall Risk  No flowsheet data found. Pt currently taking Anticoagulant therapy? No    Coordination of Care:  1. Have you been to the ER, urgent care clinic since your last visit? Hospitalized since your last visit? [facility] ED 12/15/18    2. Have you seen or consulted any other health care providers outside of the [facility] since your last visit? Include any pap smears or colon screening.  no

## 2018-12-18 NOTE — PROGRESS NOTES
HISTORY OF PRESENT ILLNESS  Seth Humphries is a 62 y.o. male. HPI  He is referred back to my office for evaluation of bradycardia. He was seen in the ER on 12/15/18 for back pain where he was found to have bradycardia and was advised to see a cardiologist. He denies any cardiac symptoms. He denies chest pain, dyspnea, orthopnea, PND. He denies palpitation, dizziness or syncope. He denies any symptoms of TIA or amaurosis fugax. He has been active. Fortunately, he has come off drugs such as cocaine and heroin and he has been on Methadone thus far. He had an echocardiogram at his primary care physicians office on 12/17/2015, which demonstrated normal LV function with EF in the range of 68%. There was no evidence of significant valvular pathology. Left atrial dimension was normal and so was the right atrial dimension. He is currently an every-day smoker. He does have an active history of substance abuse with heroin in the past thirty-five years. He indicates that he gets nauseated quite often (almost daily) when his heroin level goes down. He has had some chest pain, but he denies exertional pain. He has no history of hypertension or diabetes mellitus. He was seen at [facility] in December of 2015 with heroin withdrawal, at which time, he was found to have some bradycardia. He works at [employer] and he does have good muscles in his arms and chest.      Review of Systems   Constitutional: Negative for malaise/fatigue and weight loss. HENT: Negative for hearing loss. Eyes: Negative for blurred vision and double vision. Respiratory: Negative for shortness of breath. Cardiovascular: Negative for chest pain, palpitations, orthopnea, claudication, leg swelling and PND. Gastrointestinal: Negative for blood in stool, heartburn and melena. Genitourinary: Negative for dysuria, frequency, hematuria and urgency. Musculoskeletal: Negative for back pain and joint pain.    Skin: Negative for itching and rash. Neurological: Positive for dizziness. Negative for loss of consciousness and weakness. Psychiatric/Behavioral: Negative for depression and memory loss. Physical Exam   Constitutional: He is oriented to person, place, and time. He appears well-developed and well-nourished. HENT:   Head: Normocephalic. Eyes: Conjunctivae are normal. Pupils are equal, round, and reactive to light. Neck: Normal range of motion. Neck supple. No JVD present. Cardiovascular: Regular rhythm, S1 normal and S2 normal.  No extrasystoles are present. Bradycardia present. PMI is not displaced. Exam reveals no gallop and no friction rub. No murmur heard. Pulses:       Carotid pulses are 3+ on the right side, and 3+ on the left side. Pulmonary/Chest: Effort normal. He has no rales. Abdominal: Soft. There is no tenderness. Musculoskeletal: He exhibits no edema. Neurological: He is alert and oriented to person, place, and time. No cranial nerve deficit. Skin: Skin is warm and dry. Psychiatric: He has a normal mood and affect.  His behavior is normal.     Visit Vitals  /72   Ht 5' 9\" (1.753 m)   Wt 78 kg (172 lb)   SpO2 99%   BMI 25.40 kg/m²       Past Medical History:   Diagnosis Date    Acid indigestion     Anemia NEC     Back pain     Cardiomegaly     1/12 LVH on EKG    Chest pain, unspecified     Drug use     Heroin causing adverse effect in therapeutic use     Irregular heart beat     Nonspecific abnormal electrocardiogram (ECG) (EKG)     S Ronak, RBBB    Pulmonary nodule     Tachycardia     Tightness in chest     Tiredness     Tobacco use disorder        Social History     Socioeconomic History    Marital status: SINGLE     Spouse name: Not on file    Number of children: Not on file    Years of education: Not on file    Highest education level: Not on file   Social Needs    Financial resource strain: Not on file    Food insecurity - worry: Not on file    Food insecurity - inability: Not on file    Transportation needs - medical: Not on file   Adaptivity needs - non-medical: Not on file   Occupational History    Not on file   Tobacco Use    Smoking status: Current Every Day Smoker     Packs/day: 1.00     Years: 34.00     Pack years: 34.00     Types: Cigarettes    Smokeless tobacco: Never Used   Substance and Sexual Activity    Alcohol use: No     Alcohol/week: 0.5 oz     Types: 1 Cans of beer per week     Comment: occasional    Drug use: Yes     Types: Marijuana, Heroin    Sexual activity: Not on file   Other Topics Concern    Not on file   Social History Narrative    Not on file       Family History   Problem Relation Age of Onset    Diabetes Mother     Hypertension Mother     Thyroid Disease Mother     High Cholesterol Mother     Cancer Father     Heart Disease Father     Hypertension Father     Diabetes Sister     Hypertension Sister     Hypertension Brother     Hypertension Sister     High Cholesterol Sister        Past Surgical History:   Procedure Laterality Date    HX CIRCUMCISION      HX CIRCUMCISION      HX OTHER SURGICAL      HX TORSION OF THE TESTES    HX OTHER SURGICAL  2007 approx    lung biopsy       Current Outpatient Medications   Medication Sig Dispense Refill    methocarbamol (ROBAXIN-750) 750 mg tablet Take 2 Tabs by mouth nightly as needed. 10 Tab 0    ibuprofen (MOTRIN) 600 mg tablet Take 1 Tab by mouth every six (6) hours as needed for Pain. 30 Tab 0       EKG: unchanged from previous tracings, sinus bradycardia, RBBB. ASSESSMENT and PLAN  Encounter Diagnoses   Name Primary?  Bradycardia Yes    Chest pain, unspecified type     Tobacco use disorder     Heroin abuse ([facility] [address]     RBBB    He has had no symptoms to indicate angina or cardiac decompensation. He shows no physical findings of structural heart disease. He has had asymptomatic sinus bradycardia and right bundle branch block.  His sinus bradycardia could be physiologic as he has been completely asymptomatic. At this point, I would proceed with a Holter monitor to see if he has any indication of sick sinus syndrome. I would also like to evaluate his heart rate response to exercise.

## 2018-12-28 ENCOUNTER — HOSPITAL ENCOUNTER (EMERGENCY)
Age: 58
Discharge: HOME | End: 2018-12-28
Attending: EMERGENCY MEDICINE
Payer: COMMERCIAL

## 2018-12-28 ENCOUNTER — APPOINTMENT (OUTPATIENT)
Dept: CT IMAGING | Age: 58
End: 2018-12-28
Attending: PHYSICIAN ASSISTANT
Payer: COMMERCIAL

## 2018-12-28 VITALS
TEMPERATURE: 97.9 F | BODY MASS INDEX: 24.81 KG/M2 | HEART RATE: 44 BPM | WEIGHT: 168 LBS | DIASTOLIC BLOOD PRESSURE: 60 MMHG | SYSTOLIC BLOOD PRESSURE: 106 MMHG | OXYGEN SATURATION: 98 % | RESPIRATION RATE: 13 BRPM

## 2018-12-28 DIAGNOSIS — R10.32 ABDOMINAL PAIN, LLQ (LEFT LOWER QUADRANT): Primary | ICD-10-CM

## 2018-12-28 DIAGNOSIS — R79.89 ELEVATED SERUM CREATININE: ICD-10-CM

## 2018-12-28 DIAGNOSIS — R00.1 BRADYCARDIA: ICD-10-CM

## 2018-12-28 DIAGNOSIS — R19.09 ABDOMINAL MASS OF OTHER SITE: ICD-10-CM

## 2018-12-28 DIAGNOSIS — R59.0 ABDOMINAL LYMPHADENOPATHY: ICD-10-CM

## 2018-12-28 LAB
ALBUMIN SERPL-MCNC: 3.3 G/DL (ref 3.4–5)
ALBUMIN/GLOB SERPL: 0.7 {RATIO} (ref 0.8–1.7)
ALP SERPL-CCNC: 80 U/L (ref 45–117)
ALT SERPL-CCNC: 25 U/L (ref 16–61)
ANION GAP SERPL CALC-SCNC: 2 MMOL/L (ref 3–18)
APPEARANCE UR: CLEAR
AST SERPL-CCNC: 25 U/L (ref 15–37)
BASOPHILS # BLD: 0 K/UL (ref 0–0.1)
BASOPHILS NFR BLD: 0 % (ref 0–2)
BILIRUB SERPL-MCNC: 0.4 MG/DL (ref 0.2–1)
BILIRUB UR QL: NEGATIVE
BUN SERPL-MCNC: 16 MG/DL (ref 7–18)
BUN/CREAT SERPL: 12 (ref 12–20)
CALCIUM SERPL-MCNC: 9.3 MG/DL (ref 8.5–10.1)
CHLORIDE SERPL-SCNC: 105 MMOL/L (ref 100–108)
CO2 SERPL-SCNC: 30 MMOL/L (ref 21–32)
COLOR UR: YELLOW
CREAT SERPL-MCNC: 1.39 MG/DL (ref 0.6–1.3)
DIFFERENTIAL METHOD BLD: ABNORMAL
EOSINOPHIL # BLD: 0 K/UL (ref 0–0.4)
EOSINOPHIL NFR BLD: 1 % (ref 0–5)
ERYTHROCYTE [DISTWIDTH] IN BLOOD BY AUTOMATED COUNT: 14 % (ref 11.6–14.5)
GLOBULIN SER CALC-MCNC: 4.6 G/DL (ref 2–4)
GLUCOSE SERPL-MCNC: 121 MG/DL (ref 74–99)
GLUCOSE UR STRIP.AUTO-MCNC: NEGATIVE MG/DL
HCT VFR BLD AUTO: 46.5 % (ref 36–48)
HGB BLD-MCNC: 14.9 G/DL (ref 13–16)
HGB UR QL STRIP: NEGATIVE
KETONES UR QL STRIP.AUTO: NEGATIVE MG/DL
LEUKOCYTE ESTERASE UR QL STRIP.AUTO: NEGATIVE
LYMPHOCYTES # BLD: 1.2 K/UL (ref 0.9–3.6)
LYMPHOCYTES NFR BLD: 19 % (ref 21–52)
MCH RBC QN AUTO: 26.9 PG (ref 24–34)
MCHC RBC AUTO-ENTMCNC: 32 G/DL (ref 31–37)
MCV RBC AUTO: 83.9 FL (ref 74–97)
MONOCYTES # BLD: 0.3 K/UL (ref 0.05–1.2)
MONOCYTES NFR BLD: 4 % (ref 3–10)
NEUTS SEG # BLD: 4.9 K/UL (ref 1.8–8)
NEUTS SEG NFR BLD: 76 % (ref 40–73)
NITRITE UR QL STRIP.AUTO: NEGATIVE
PH UR STRIP: 6 [PH] (ref 5–8)
PLATELET # BLD AUTO: 187 K/UL (ref 135–420)
PMV BLD AUTO: 12.4 FL (ref 9.2–11.8)
POTASSIUM SERPL-SCNC: 3.9 MMOL/L (ref 3.5–5.5)
PROT SERPL-MCNC: 7.9 G/DL (ref 6.4–8.2)
PROT UR STRIP-MCNC: NEGATIVE MG/DL
RBC # BLD AUTO: 5.54 M/UL (ref 4.7–5.5)
SODIUM SERPL-SCNC: 137 MMOL/L (ref 136–145)
SP GR UR REFRACTOMETRY: 1.02 (ref 1–1.03)
UROBILINOGEN UR QL STRIP.AUTO: 1 EU/DL (ref 0.2–1)
WBC # BLD AUTO: 6.4 K/UL (ref 4.6–13.2)

## 2018-12-28 PROCEDURE — 74011636320 HC RX REV CODE- 636/320: Performed by: EMERGENCY MEDICINE

## 2018-12-28 PROCEDURE — 99285 EMERGENCY DEPT VISIT HI MDM: CPT

## 2018-12-28 PROCEDURE — 81003 URINALYSIS AUTO W/O SCOPE: CPT

## 2018-12-28 PROCEDURE — 96375 TX/PRO/DX INJ NEW DRUG ADDON: CPT

## 2018-12-28 PROCEDURE — 96374 THER/PROPH/DIAG INJ IV PUSH: CPT

## 2018-12-28 PROCEDURE — 80053 COMPREHEN METABOLIC PANEL: CPT

## 2018-12-28 PROCEDURE — 85025 COMPLETE CBC W/AUTO DIFF WBC: CPT

## 2018-12-28 PROCEDURE — 74011250637 HC RX REV CODE- 250/637: Performed by: PHYSICIAN ASSISTANT

## 2018-12-28 PROCEDURE — 74011250636 HC RX REV CODE- 250/636: Performed by: PHYSICIAN ASSISTANT

## 2018-12-28 PROCEDURE — 96361 HYDRATE IV INFUSION ADD-ON: CPT

## 2018-12-28 PROCEDURE — 74177 CT ABD & PELVIS W/CONTRAST: CPT

## 2018-12-28 RX ORDER — OXYCODONE AND ACETAMINOPHEN 5; 325 MG/1; MG/1
1 TABLET ORAL
Qty: 10 TAB | Refills: 0 | Status: SHIPPED | OUTPATIENT
Start: 2018-12-28 | End: 2019-04-30 | Stop reason: ALTCHOICE

## 2018-12-28 RX ORDER — HYDROCODONE BITARTRATE AND ACETAMINOPHEN 5; 325 MG/1; MG/1
2 TABLET ORAL
Status: DISCONTINUED | OUTPATIENT
Start: 2018-12-28 | End: 2018-12-28

## 2018-12-28 RX ORDER — OXYCODONE AND ACETAMINOPHEN 5; 325 MG/1; MG/1
1 TABLET ORAL
Status: COMPLETED | OUTPATIENT
Start: 2018-12-28 | End: 2018-12-28

## 2018-12-28 RX ORDER — KETOROLAC TROMETHAMINE 30 MG/ML
15 INJECTION, SOLUTION INTRAMUSCULAR; INTRAVENOUS
Status: COMPLETED | OUTPATIENT
Start: 2018-12-28 | End: 2018-12-28

## 2018-12-28 RX ORDER — ONDANSETRON 2 MG/ML
4 INJECTION INTRAMUSCULAR; INTRAVENOUS
Status: COMPLETED | OUTPATIENT
Start: 2018-12-28 | End: 2018-12-28

## 2018-12-28 RX ADMIN — ONDANSETRON 4 MG: 2 INJECTION INTRAMUSCULAR; INTRAVENOUS at 18:55

## 2018-12-28 RX ADMIN — KETOROLAC TROMETHAMINE 15 MG: 30 INJECTION, SOLUTION INTRAMUSCULAR at 18:55

## 2018-12-28 RX ADMIN — SODIUM CHLORIDE 1000 ML: 900 INJECTION, SOLUTION INTRAVENOUS at 18:55

## 2018-12-28 RX ADMIN — IOPAMIDOL 80 ML: 612 INJECTION, SOLUTION INTRAVENOUS at 19:53

## 2018-12-28 RX ADMIN — OXYCODONE AND ACETAMINOPHEN 1 TABLET: 5; 325 TABLET ORAL at 21:18

## 2018-12-28 NOTE — ED TRIAGE NOTES
Patient complains of back pain , patient states that he was seen in the ED a few weeks ago for the same problem. Patient was given muscle relaxer's without relief.

## 2018-12-28 NOTE — ED PROVIDER NOTES
EMERGENCY DEPARTMENT HISTORY AND PHYSICAL EXAM 
 
5:50 PM 
 
 
Date: 12/28/2018 Patient Name: Jin Maldonado History of Presenting Illness Chief Complaint Patient presents with  Back Pain History Provided By: Patient Chief Complaint: Back pain Duration: today Timing:  Constant Location: Back Quality: Not obtained Severity: Moderate Modifying Factors: Hot water alleviates the pain and sitting in the car exacerbates the pain Associated Symptoms: Nausea, fever, chills Additional History (Context): 5:50 PM Jin Maldonado is a 62 y.o. male with h/o back pain, anemia, tachycardia, and is a current smoker who presents to ED complaining of constant moderate back pain onset today. The pt says that he woke up this morning with lower back pain. He got in the shower and said he ran some hot water on his back and it felt better. He is also experiencing abdominal pain, nausea, fever, and chills. The patient says that he was in the ED a few weeks ago with back pain and was given muscle relaxers. He got better but he says that today he started to experience all his symptoms again. He denies any CP, dyspnea, vomiting, urinary symptoms, numbness or tingling in his legs, weakness. He says that sitting in the car makes his back hurt more. No other concerns or symptoms at this time. PCP: Lena Xavier MD 
 
Current Outpatient Medications Medication Sig Dispense Refill  oxyCODONE-acetaminophen (PERCOCET) 5-325 mg per tablet Take 1 Tab by mouth every six (6) hours as needed for Pain. Max Daily Amount: 4 Tabs. 10 Tab 0 Past History Past Medical History: 
Past Medical History:  
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   
 Tiredness  Tobacco use disorder Past Surgical History: 
Past Surgical History:  
Procedure Laterality Date  HX CIRCUMCISION    
 HX CIRCUMCISION    
 HX OTHER SURGICAL    
 HX TORSION OF THE TESTES  
 HX OTHER SURGICAL  2007 approx  
 lung biopsy Family History: 
Family History Problem Relation Age of Onset  Diabetes Mother  Hypertension Mother  Thyroid Disease Mother  High Cholesterol Mother  Cancer Father  Heart Disease Father  Hypertension Father  Diabetes Sister  Hypertension Sister  Hypertension Brother  Hypertension Sister  High Cholesterol Sister Social History: 
Social History Tobacco Use  Smoking status: Current Every Day Smoker Packs/day: 1.00 Years: 34.00 Pack years: 34.00 Types: Cigarettes  Smokeless tobacco: Never Used Substance Use Topics  Alcohol use: No  
  Alcohol/week: 0.5 oz Types: 1 Cans of beer per week Comment: occasional  
 Drug use: Yes Types: Marijuana, Heroin Allergies: 
No Known Allergies Review of Systems Review of Systems Constitutional: Positive for chills and fever. Negative for activity change and appetite change. HENT: Negative for congestion. Eyes: Negative for visual disturbance. Respiratory: Negative for cough and shortness of breath. Cardiovascular: Negative for chest pain. Gastrointestinal: Positive for abdominal pain. Negative for diarrhea, nausea and vomiting. Genitourinary: Negative for dysuria. Musculoskeletal: Positive for back pain (Lower back ). Negative for arthralgias and myalgias. Skin: Negative for rash. Neurological: Negative for weakness and numbness. All other systems reviewed and are negative. Physical Exam  
 
Visit Vitals /60 Pulse (!) 44 Temp 97.9 °F (36.6 °C) Resp 13 Wt 76.2 kg (168 lb) SpO2 98% BMI 24.81 kg/m² Physical Exam  
Constitutional: He appears well-developed and well-nourished. No distress. HENT:  
Head: Normocephalic and atraumatic. Neck: Normal range of motion. Neck supple. Cardiovascular: Regular rhythm, normal heart sounds and intact distal pulses. Bradycardia present. Exam reveals no gallop and no friction rub. No murmur heard. Pulmonary/Chest: Effort normal and breath sounds normal. No respiratory distress. He has no wheezes. He has no rales. Abdominal: Soft. He exhibits no distension and no mass. There is tenderness in the suprapubic area and left lower quadrant. There is no rebound and no guarding. Musculoskeletal: Normal range of motion. He exhibits no edema. Lumbar back: He exhibits tenderness (Diffuse ). He exhibits no edema. No erythema or ecchymosis. Neurological: He is alert. Strength 5/5 of LE No saddle anesthesia Skin: Skin is warm. No rash noted. He is not diaphoretic. Moderate cyst in thoracic region, chronic per pt Nursing note and vitals reviewed. Diagnostic Study Results Labs - Recent Results (from the past 12 hour(s)) METABOLIC PANEL, COMPREHENSIVE Collection Time: 12/28/18  6:35 PM  
Result Value Ref Range Sodium 137 136 - 145 mmol/L Potassium 3.9 3.5 - 5.5 mmol/L Chloride 105 100 - 108 mmol/L  
 CO2 30 21 - 32 mmol/L Anion gap 2 (L) 3.0 - 18 mmol/L Glucose 121 (H) 74 - 99 mg/dL BUN 16 7.0 - 18 MG/DL Creatinine 1.39 (H) 0.6 - 1.3 MG/DL  
 BUN/Creatinine ratio 12 12 - 20 GFR est AA >60 >60 ml/min/1.73m2 GFR est non-AA 52 (L) >60 ml/min/1.73m2 Calcium 9.3 8.5 - 10.1 MG/DL Bilirubin, total 0.4 0.2 - 1.0 MG/DL  
 ALT (SGPT) 25 16 - 61 U/L  
 AST (SGOT) 25 15 - 37 U/L Alk. phosphatase 80 45 - 117 U/L Protein, total 7.9 6.4 - 8.2 g/dL Albumin 3.3 (L) 3.4 - 5.0 g/dL Globulin 4.6 (H) 2.0 - 4.0 g/dL  A-G Ratio 0.7 (L) 0.8 - 1.7    
CBC WITH AUTOMATED DIFF  
 Collection Time: 12/28/18  6:35 PM  
Result Value Ref Range WBC 6.4 4.6 - 13.2 K/uL  
 RBC 5.54 (H) 4.70 - 5.50 M/uL  
 HGB 14.9 13.0 - 16.0 g/dL HCT 46.5 36.0 - 48.0 % MCV 83.9 74.0 - 97.0 FL  
 MCH 26.9 24.0 - 34.0 PG  
 MCHC 32.0 31.0 - 37.0 g/dL  
 RDW 14.0 11.6 - 14.5 % PLATELET 598 867 - 471 K/uL MPV 12.4 (H) 9.2 - 11.8 FL  
 NEUTROPHILS 76 (H) 40 - 73 % LYMPHOCYTES 19 (L) 21 - 52 % MONOCYTES 4 3 - 10 % EOSINOPHILS 1 0 - 5 % BASOPHILS 0 0 - 2 %  
 ABS. NEUTROPHILS 4.9 1.8 - 8.0 K/UL  
 ABS. LYMPHOCYTES 1.2 0.9 - 3.6 K/UL  
 ABS. MONOCYTES 0.3 0.05 - 1.2 K/UL  
 ABS. EOSINOPHILS 0.0 0.0 - 0.4 K/UL  
 ABS. BASOPHILS 0.0 0.0 - 0.1 K/UL  
 DF AUTOMATED URINALYSIS W/ RFLX MICROSCOPIC Collection Time: 12/28/18  8:52 PM  
Result Value Ref Range Color YELLOW Appearance CLEAR Specific gravity 1.023 1.005 - 1.030    
 pH (UA) 6.0 5.0 - 8.0 Protein NEGATIVE  NEG mg/dL Glucose NEGATIVE  NEG mg/dL Ketone NEGATIVE  NEG mg/dL Bilirubin NEGATIVE  NEG Blood NEGATIVE  NEG Urobilinogen 1.0 0.2 - 1.0 EU/dL Nitrites NEGATIVE  NEG Leukocyte Esterase NEGATIVE  NEG Radiologic Studies -  
CT ABD PELV W CONT Final Result Impression: 1. There is an infiltrative process primarily centered within the  
retroperitoneum characterized by uniform low density (10-20 Hounsfield units),  
encasing the aorta, IVC and other retroperitoneal structures. Some discrete  
enlarged lymph nodes within this infiltrative process noted. Lymphoma is  
favored. Retroperitoneal fibrosis is an additional less likely consideration. 2. Scarring at the right lung base. 3. Soft tissue structure within the right hemiscrotum with a thin peripheral  
hyperdense rim. Clinical history noted history of testicular torsion. Uncertain  
whether this is sequela of that event. Testicular ultrasound could further  
evaluate as warranted. Medical Decision Making I am the first provider for this patient. I reviewed the vital signs, available nursing notes, past medical history, past surgical history, family history and social history. Vital Signs-Reviewed the patient's vital signs. Records Reviewed: Nursing Notes and Old Medical Records (Time of Review: 5:50 PM) ED Course: Progress Notes, Reevaluation, and Consults: 
9:00 PM: Reviewed CT results with Dr. Ivy Grady. Pt will need close outpatient follow-up. 
9:04 PM Reviewed results with patient and family member. Pt denies testicular swelling or pain. Resting comfortably, tolerating PO. Family member notes patient's PMD is Dr. Olivier Mason. Denies hx of cancer, denies recent weight loss. Discussed need for close outpatient follow-up for further evaluation for possible lymphoma. Printed CT report for their records. Discussed strict return precautions, including fever, vomiting, or any other medical concerns. Pt and family member verbalize understanding. Feel comfortable with d/c with short course of Percocet for pain. Provider Notes (Medical Decision Making): 63 yo M who presents due to back pain, abdominal pain, nausea. Afebrile, VSS. Hx of bradycardia. Labs essentially unremarkable. Ct abdomen/pelvis concerning for lymphoma. Reviewed results with patient and family member. Will follow-up with Dr. Olivier Mason as outpatient. Referral to oncologist provided as well. Stable for d/c with outpatient follow-up and strict return precautions for any new, worsening, or persistent symptoms. Diagnosis Clinical Impression: 1. Abdominal pain, LLQ (left lower quadrant) 2. Elevated serum creatinine 3. Abdominal mass of other site 4. Abdominal lymphadenopathy 5. Bradycardia Disposition: Discharge Follow-up Information Follow up With Specialties Details Why Contact Info  [facility] EMERGENCY DEPT Emergency Medicine  If symptoms worsen [address] [facility] 
 [address] [facility] [address] 
[phone] Mariajose Sotelo MD [facility] Schedule an appointment as soon as possible for a visit AS SOON AS POSSIBLE [address] [facility] [place] [address] 
[phone] [facility]    [address] [facility] [place] , [address] 
[phone] Medication List  
  
START taking these medications   
oxyCODONE-acetaminophen 5-325 mg per tablet Commonly known as:  PERCOCET Take 1 Tab by mouth every six (6) hours as needed for Pain. Max Daily Amount: 4 Tabs. STOP taking these medications   
ibuprofen 600 mg tablet Commonly known as:  MOTRIN 
  
methocarbamol 750 mg tablet Commonly known as:  [id number] Where to Get Your Medications Information about where to get these medications is not yet available Ask your nurse or doctor about these medications · oxyCODONE-acetaminophen 5-325 mg per tablet 
  
 
_______________________________ Scribe Attestation Abraham Jamison acting as a scribe for and in the presence of Tita Castañeda [place] December 28, 2018 at 5:50 PM 
    
Provider Attestation:     
I personally performed the services described in the documentation, reviewed the documentation, as recorded by the scribe in my presence, and it accurately and completely records my words and actions. December 28, 2018 at 5:50 PM - Ashish Stevens  
 
 
_______________________________

## 2018-12-29 NOTE — ED NOTES
I have reviewed discharge instructions with the patient. The patient verbalized understanding. Pt currently getting dressed in room.  Explained to pt what Oncology was and how important it was for him to follow up with MD.

## 2018-12-29 NOTE — ED NOTES
Bedside shift change report given to Bridget Campo RN (oncoming nurse) by Oneil Fuentes RN (offgoing nurse). Report included the following information SBAR, ED Summary, MAR and Recent Results.

## 2018-12-29 NOTE — DISCHARGE INSTRUCTIONS
Take medication as prescribed. Follow-up with your primary care physician in 2 days for reassessment and further evaluation for the mass found in your abdomen. It is important for you to follow-up to have further imaging TO EVALUATE FOR CANCER, including lymphoma. Bring the results from this visit with your for their review. Return to the ED for any new, worsening, or persistent symptoms, including fever, vomiting, chest pain, shortness of breath, or any other medical concerns. Bradycardia: Care Instructions  Your Care Instructions    Bradycardia is a slow heart rate. If your heart beats too slowly, it can't supply your body with enough blood. This can make you weak or dizzy. Or it may make you pass out. Sometimes medicine can cause this problem. If this happens, your doctor may have you adjust one of your medicines. If a medicine is not the problem, your doctor may recommend a pacemaker. It is important to treat bradycardia so that you don't get more serious health problems. Your doctor will want to see you on a routine schedule to make sure that your heartbeat is normal.  Follow-up care is a key part of your treatment and safety. Be sure to make and go to all appointments, and call your doctor if you are having problems. It's also a good idea to know your test results and keep a list of the medicines you take. How can you care for yourself at home? · Take your medicines exactly as prescribed. Call your doctor if you think you are having a problem with your medicine. If your bradycardia is caused by another disease, your doctor will try to treat the disease. If it is caused by heart medicines, he or she will adjust your medicines. · Make lifestyle changes to improve your heart health. ? Get regular exercise. Try for 30 minutes on most days of the week. If you do not have other heart problems, you likely do not have limits on the type or level of activity that you can do.  You may want to walk, swim, bike, or do other activities. Ask your doctor what level of exercise is safe for you. ? To control your cholesterol, avoid foods with a lot of fat, saturated fat, or sodium. Try to eat more fiber. And if your doctor says it's okay, get some exercise on most days. ? Do not smoke. Smoking can make your heart condition worse. If you need help quitting, talk to your doctor about stop-smoking programs and medicines. These can increase your chances of quitting for good. ? Limit alcohol to 2 drinks a day for men and 1 drink a day for women. Too much alcohol can cause health problems. Pacemaker  If you have a pacemaker, you will get more specific information about it. Be sure to:  · Check your pulse as your doctor tells you. · Have your pacemaker checked as often as your doctor recommends. You may be able to do this over the phone or computer. · Avoid strong magnetic or electrical fields. These include MRIs, welding equipment, and generators. · You will be checked several times right after you get your pacemaker and when it is time to have the battery changed. Batteries last for 5 to 15 years. · You can talk on a cell phone. But keep it 6 inches away from your pacemaker. · Microwaves, TVs, radios, and kitchen and bathroom appliances won't harm you. When should you call for help? Call 911 anytime you think you may need emergency care. For example, call if:    · You have symptoms of sudden heart failure. These may include:  ? Severe trouble breathing. ? A fast or irregular heartbeat. ? Coughing up pink, foamy mucus. ? You passed out.     · You have symptoms of a stroke. These may include:  ? Sudden numbness, tingling, weakness, or loss of movement in your face, arm, or leg, especially on only one side of your body. ? Sudden vision changes. ? Sudden trouble speaking. ? Sudden confusion or trouble understanding simple statements. ? Sudden problems with walking or balance.   ? A sudden, severe headache that is different from past headaches.    Call your doctor now or seek immediate medical care if:    · You have new or changed symptoms of heart failure, such as:  ? New or increased shortness of breath. ? New or worse swelling in your legs, ankles, or feet. ? Sudden weight gain, such as more than 2 to 3 pounds in a day or 5 pounds in a week. (Your doctor may suggest a different range of weight gain.)  ? Feeling dizzy or lightheaded or like you may faint. ? Feeling so tired or weak that you cannot do your usual activities. ? Not sleeping well. Shortness of breath wakes you at night. You need extra pillows to prop yourself up to breathe easier.    Watch closely for changes in your health, and be sure to contact your doctor if:    · You do not get better as expected. Where can you learn more? Go to http://kelly-james.info/. Enter E587 in the search box to learn more about \"Bradycardia: Care Instructions. \"  Current as of: December 6, 2017  Content Version: 11.8  © 8183-5025 [employer]. Care instructions adapted under license by [employer] (which disclaims liability or warranty for this information). If you have questions about a medical condition or this instruction, always ask your healthcare professional. [address] any warranty or liability for your use of this information. Swollen Lymph Nodes: Care Instructions  Your Care Instructions    Lymph nodes are small, bean-shaped glands throughout the body. They help your body fight germs and infections. Lymph nodes often swell when there is a problem such as an injury, infection, or tumor. · The nodes in your neck, under your chin, or behind your ears may swell when you have a cold or sore throat. · An injury or infection in a leg or foot can make the nodes in your groin swell. · Sometimes medicine can make lymph nodes swell, but this is rare.   Treatment depends on what caused your nodes to swell. Usually the nodes return to normal size without a problem. Follow-up care is a key part of your treatment and safety. Be sure to make and go to all appointments, and call your doctor if you are having problems. It's also a good idea to know your test results and keep a list of the medicines you take. How can you care for yourself at home? · Take your medicines exactly as prescribed. Call your doctor if you think you are having a problem with your medicine. · Avoid irritation. ? Do not squeeze or pick at the lump. ? Do not stick a needle in it. · Prevent infection. Do not squeeze, drain, or puncture a painful lump. Doing this can irritate or inflame the lump, push any existing infection deeper into the skin, or cause severe bleeding. · Get extra rest. Slow down just a little from your usual routine. · Drink plenty of fluids, enough so that your urine is light yellow or clear like water. If you have kidney, heart, or liver disease and have to limit fluids, talk with your doctor before you increase the amount of fluids you drink. · Take an over-the-counter pain medicine, such as acetaminophen (Tylenol), ibuprofen (Advil, Motrin), or naproxen (Aleve). Read and follow all instructions on the label. · Do not take two or more pain medicines at the same time unless the doctor told you to. Many pain medicines have acetaminophen, which is Tylenol. Too much acetaminophen (Tylenol) can be harmful. When should you call for help? Call your doctor now or seek immediate medical care if:    · You have worse symptoms of infection, such as:  ? Increased pain, swelling, warmth, or redness. ? Red streaks leading from the area. ? Pus draining from the area. ? A fever.    Watch closely for changes in your health, and be sure to contact your doctor if:    · Your lymph nodes do not get smaller or do not return to normal.     · You do not get better as expected. Where can you learn more?   Go to http://kelly-james.info/. Enter F190 in the search box to learn more about \"Swollen Lymph Nodes: Care Instructions. \"  Current as of: November 18, 2017  Content Version: 11.8  © 2772-0739 [employer]. Care instructions adapted under license by [employer] (which disclaims liability or warranty for this information). If you have questions about a medical condition or this instruction, always ask your healthcare professional. [place] [address] any warranty or liability for your use of this information. Abdominal Pain: Care Instructions  Your Care Instructions    Abdominal pain has many possible causes. Some aren't serious and get better on their own in a few days. Others need more testing and treatment. If your pain continues or gets worse, you need to be rechecked and may need more tests to find out what is wrong. You may need surgery to correct the problem. Don't ignore new symptoms, such as fever, nausea and vomiting, urination problems, pain that gets worse, and dizziness. These may be signs of a more serious problem. Your doctor may have recommended a follow-up visit in the next 8 to 12 hours. If you are not getting better, you may need more tests or treatment. The doctor has checked you carefully, but problems can develop later. If you notice any problems or new symptoms, get medical treatment right away. Follow-up care is a key part of your treatment and safety. Be sure to make and go to all appointments, and call your doctor if you are having problems. It's also a good idea to know your test results and keep a list of the medicines you take. How can you care for yourself at home? · Rest until you feel better. · To prevent dehydration, drink plenty of fluids, enough so that your urine is light yellow or clear like water. Choose water and other caffeine-free clear liquids until you feel better.  If you have kidney, heart, or liver disease and have to limit fluids, talk with your doctor before you increase the amount of fluids you drink. · If your stomach is upset, eat mild foods, such as rice, dry toast or crackers, bananas, and applesauce. Try eating several small meals instead of two or three large ones. · Wait until 48 hours after all symptoms have gone away before you have spicy foods, alcohol, and drinks that contain caffeine. · Do not eat foods that are high in fat. · Avoid anti-inflammatory medicines such as aspirin, ibuprofen (Advil, Motrin), and naproxen (Aleve). These can cause stomach upset. Talk to your doctor if you take daily aspirin for another health problem. When should you call for help? Call 911 anytime you think you may need emergency care. For example, call if:    · You passed out (lost consciousness).     · You pass maroon or very bloody stools.     · You vomit blood or what looks like coffee grounds.     · You have new, severe belly pain.    Call your doctor now or seek immediate medical care if:    · Your pain gets worse, especially if it becomes focused in one area of your belly.     · You have a new or higher fever.     · Your stools are black and look like tar, or they have streaks of blood.     · You have unexpected vaginal bleeding.     · You have symptoms of a urinary tract infection. These may include:  ? Pain when you urinate. ? Urinating more often than usual.  ? Blood in your urine.     · You are dizzy or lightheaded, or you feel like you may faint.    Watch closely for changes in your health, and be sure to contact your doctor if:    · You are not getting better after 1 day (24 hours). Where can you learn more? Go to http://kelly-james.info/. Enter E156 in the search box to learn more about \"Abdominal Pain: Care Instructions. \"  Current as of: November 20, 2017  Content Version: 11.8  © 5606-7239 [employer], [employer].  Care instructions adapted under license by [employer] (which disclaims liability or warranty for this information). If you have questions about a medical condition or this instruction, always ask your healthcare professional. [address] any warranty or liability for your use of this information.

## 2019-02-01 ENCOUNTER — HOSPITAL ENCOUNTER (OUTPATIENT)
Dept: CT IMAGING | Age: 59
Discharge: HOME | End: 2019-02-01
Attending: FAMILY MEDICINE
Payer: COMMERCIAL

## 2019-02-01 DIAGNOSIS — R91.8 LUNG NODULES: ICD-10-CM

## 2019-02-01 PROCEDURE — 71250 CT THORAX DX C-: CPT

## 2019-02-14 ENCOUNTER — HOSPITAL ENCOUNTER (OUTPATIENT)
Dept: PET IMAGING | Age: 59
Discharge: HOME | End: 2019-02-14
Attending: INTERNAL MEDICINE
Payer: COMMERCIAL

## 2019-02-14 DIAGNOSIS — R59.0 BILATERAL HILAR ADENOPATHY SYNDROME: ICD-10-CM

## 2019-02-14 PROCEDURE — A9552 F18 FDG: HCPCS

## 2019-04-30 ENCOUNTER — OFFICE VISIT (OUTPATIENT)
Dept: CARDIOLOGY CLINIC | Age: 59
End: 2019-04-30

## 2019-04-30 VITALS
DIASTOLIC BLOOD PRESSURE: 90 MMHG | BODY MASS INDEX: 27.25 KG/M2 | WEIGHT: 184 LBS | OXYGEN SATURATION: 97 % | SYSTOLIC BLOOD PRESSURE: 150 MMHG | HEIGHT: 69 IN | HEART RATE: 55 BPM

## 2019-04-30 DIAGNOSIS — I45.10 RBBB: ICD-10-CM

## 2019-04-30 DIAGNOSIS — R00.1 BRADYCARDIA: Primary | ICD-10-CM

## 2019-04-30 DIAGNOSIS — F11.10 HEROIN ABUSE (HCC): ICD-10-CM

## 2019-04-30 DIAGNOSIS — F17.200 TOBACCO USE DISORDER: ICD-10-CM

## 2019-04-30 NOTE — PROGRESS NOTES
HISTORY OF PRESENT ILLNESS  Gordy Marcos is a 62 y.o. male. HPI  He has been feeling well. He denies any cardiac symptoms. He has had no chest pain, dyspnea, orthopnea, PND. He denies palpitation, dizziness or syncope. He has had no symptoms to indicate TIA or amaurosis fugax. He apparently did have some syncope many years ago. He has been off drugs for a while. He was apparently in [facility] for a month for a probation violation. He had an echocardiogram at his primary care physicians office on 12/17/2015, which demonstrated normal LV function with EF in the range of 68%.  There was no evidence of significant valvular pathology.  Left atrial dimension was normal and so was the right atrial dimension.  He is currently an every-day smoker. Mundo Muse does have an active history of substance abuse with heroin in the past thirty-five years. Mundo Muse indicates that he gets nauseated quite often (almost daily) when his heroin level goes down. Mundo Muse has had some chest pain, but he denies exertional pain.  He has no history of hypertension or diabetes mellitus.  He was seen at [facility] in December of 2015 with heroin withdrawal, at which time, he was found to have some bradycardia.  He works at [employer] and he does have good muscles in his arms and chest.      Review of Systems   Constitutional: Negative for malaise/fatigue and weight loss. HENT: Negative for hearing loss. Eyes: Negative for blurred vision and double vision. Respiratory: Negative for shortness of breath. Cardiovascular: Negative for chest pain, palpitations, orthopnea, claudication, leg swelling and PND. Gastrointestinal: Positive for heartburn. Negative for blood in stool and melena. Genitourinary: Negative for dysuria, frequency, hematuria and urgency. Musculoskeletal: Negative for back pain and joint pain. Skin: Negative for itching and rash. Neurological: Negative for dizziness and loss of consciousness.    Psychiatric/Behavioral: Negative for depression and memory loss. Physical Exam   Constitutional: He is oriented to person, place, and time. He appears well-developed and well-nourished. HENT:   Head: Normocephalic and atraumatic. Eyes: Pupils are equal, round, and reactive to light. Conjunctivae are normal.   Neck: Normal range of motion. Neck supple. No JVD present. Cardiovascular: Regular rhythm, S1 normal and S2 normal.  No extrasystoles are present. Bradycardia present. PMI is not displaced. Exam reveals no gallop and no friction rub. Murmur heard. Harsh early systolic murmur is present with a grade of 1/6 at the upper right sternal border radiating to the neck. Pulses:       Carotid pulses are 3+ on the right side, and 3+ on the left side. Pulmonary/Chest: Effort normal. He has no rales. Abdominal: Soft. There is no tenderness. Musculoskeletal: He exhibits no edema. Neurological: He is alert and oriented to person, place, and time. No cranial nerve deficit. Skin: Skin is warm and dry. Psychiatric: He has a normal mood and affect.  His behavior is normal.     Visit Vitals  /90   Pulse (!) 55   Ht 5' 9\" (1.753 m)   Wt 83.5 kg (184 lb)   SpO2 97%   BMI 27.17 kg/m²       Past Medical History:   Diagnosis Date    Acid indigestion     Anemia NEC     Back pain     Cardiomegaly     1/12 LVH on EKG    Chest pain, unspecified     Drug use     Heroin causing adverse effect in therapeutic use     Irregular heart beat     Nonspecific abnormal electrocardiogram (ECG) (EKG)     S Ronak, RBBB    Pulmonary nodule     Tachycardia     Tightness in chest     Tiredness     Tobacco use disorder        Social History     Socioeconomic History    Marital status: SINGLE     Spouse name: Not on file    Number of children: Not on file    Years of education: Not on file    Highest education level: Not on file   Occupational History    Not on file   Social Needs    Financial resource strain: Not on file   [facility] Food insecurity:     Worry: Not on file     Inability: Not on file    Transportation needs:     Medical: Not on file     Non-medical: Not on file   Tobacco Use    Smoking status: Current Every Day Smoker     Packs/day: 1.00     Years: 34.00     Pack years: 34.00     Types: Cigarettes    Smokeless tobacco: Never Used   Substance and Sexual Activity    Alcohol use: No     Alcohol/week: 0.5 oz     Types: 1 Cans of beer per week     Comment: occasional    Drug use: Yes     Types: Marijuana, Heroin    Sexual activity: Not on file   Lifestyle    Physical activity:     Days per week: Not on file     Minutes per session: Not on file    Stress: Not on file   Relationships    Social connections:     Talks on phone: Not on file     Gets together: Not on file     Attends Taoism service: Not on file     Active member of club or organization: Not on file     Attends meetings of clubs or organizations: Not on file     Relationship status: Not on file    Intimate partner violence:     Fear of current or ex partner: Not on file     Emotionally abused: Not on file     Physically abused: Not on file     Forced sexual activity: Not on file   Other Topics Concern    Not on file   Social History Narrative    Not on file       Family History   Problem Relation Age of Onset    Diabetes Mother     Hypertension Mother     Thyroid Disease Mother     High Cholesterol Mother     Cancer Father     Heart Disease Father     Hypertension Father     Diabetes Sister     Hypertension Sister     Hypertension Brother     Hypertension Sister     High Cholesterol Sister        Past Surgical History:   Procedure Laterality Date    HX CIRCUMCISION      HX CIRCUMCISION      HX OTHER SURGICAL      HX TORSION OF THE TESTES    HX OTHER SURGICAL  2007 approx    lung biopsy       Current Outpatient Medications   Medication Sig Dispense Refill    calcium carb/magnesium hydrox (ROLAIDS PO) Take  by mouth.          EKG: unchanged from previous tracings, sinus bradycardia, RBBB. ASSESSMENT and PLAN  Encounter Diagnoses   Name Primary?  Bradycardia Yes    Tobacco use disorder     Heroin abuse ([facility] [address]     RBBB    He has been doing well. He has been completely asymptomatic from a cardiac standpoint. He indicates that he did have dizziness and syncope a number of years ago. I suspect his bradycardia is physiologic, however, I would proceed with the Holter monitor to rule out any possibility of pathologic bradycardia. He was scheduled to have a Holter monitor in the past which was not carried out because of the fact that he was in snf at that time.

## 2019-04-30 NOTE — PATIENT INSTRUCTIONS
A  will call you to schedule your holter monitor.  If you do not receive a phone call within 48 hours or miss it you may call; Central Scheduling [phone]

## 2019-04-30 NOTE — PROGRESS NOTES
Mary Huertas presents today for   Chief Complaint   Patient presents with    Follow-up     4 month     Chest Pain     throbbing on the left side radiates to back 1 week with exertion 10 minutes     Shortness of Breath     everyday with exertion        Mary Huertas preferred language for health care discussion is english/other. Is someone accompanying this pt? Yes     Is the patient using any DME equipment during OV? No     Depression Screening:  3 most recent PHQ Screens 12/18/2018   Little interest or pleasure in doing things Not at all   Feeling down, depressed, irritable, or hopeless Not at all   Total Score PHQ 2 0       Learning Assessment:  Learning Assessment 12/18/2018   PRIMARY LEARNER Patient   HIGHEST LEVEL OF EDUCATION - PRIMARY LEARNER  GRADUATED HIGH SCHOOL OR GED   BARRIERS PRIMARY LEARNER NONE   CO-LEARNER CAREGIVER No   PRIMARY LANGUAGE ENGLISH   LEARNER PREFERENCE PRIMARY DEMONSTRATION   ANSWERED BY Patient   RELATIONSHIP SELF       Abuse Screening:  Abuse Screening Questionnaire 12/18/2018   Do you ever feel afraid of your partner? N   Are you in a relationship with someone who physically or mentally threatens you? N   Is it safe for you to go home? Y       Fall Risk  No flowsheet data found. Pt currently taking Antiplatelet therapy? No     Coordination of Care:  1. Have you been to the ER, urgent care clinic since your last visit? Hospitalized since your last visit? No     2. Have you seen or consulted any other health care providers outside of the [facility] since your last visit? Include any pap smears or colon screening.  No

## 2019-05-06 ENCOUNTER — OFFICE VISIT (OUTPATIENT)
Dept: PULMONOLOGY | Age: 59
End: 2019-05-06

## 2019-05-06 VITALS
OXYGEN SATURATION: 100 % | RESPIRATION RATE: 16 BRPM | SYSTOLIC BLOOD PRESSURE: 126 MMHG | DIASTOLIC BLOOD PRESSURE: 80 MMHG | WEIGHT: 182 LBS | HEIGHT: 69 IN | TEMPERATURE: 98.3 F | BODY MASS INDEX: 26.96 KG/M2 | HEART RATE: 60 BPM

## 2019-05-06 DIAGNOSIS — J92.0 ASBESTOS-INDUCED PLEURAL PLAQUE: ICD-10-CM

## 2019-05-06 DIAGNOSIS — R06.09 DYSPNEA ON EXERTION: ICD-10-CM

## 2019-05-06 DIAGNOSIS — F17.210 CIGARETTE NICOTINE DEPENDENCE WITHOUT COMPLICATION: ICD-10-CM

## 2019-05-06 DIAGNOSIS — K21.9 LARYNGOPHARYNGEAL REFLUX (LPR): ICD-10-CM

## 2019-05-06 DIAGNOSIS — J43.2 CENTRILOBULAR EMPHYSEMA (HCC): ICD-10-CM

## 2019-05-06 DIAGNOSIS — R94.8 ABNORMAL PET SCAN OF COLON: ICD-10-CM

## 2019-05-06 DIAGNOSIS — R91.8 LUNG MASS: Primary | ICD-10-CM

## 2019-05-06 RX ORDER — BUPROPION HYDROCHLORIDE 150 MG/1
TABLET ORAL
COMMUNITY
Start: 2017-01-16 | End: 2019-05-06

## 2019-05-06 RX ORDER — DM/P-EPHED/ACETAMINOPH/DOXYLAM 30-7.5/3
2 LIQUID (ML) ORAL
Qty: 300 LOZENGE | Refills: 1 | Status: SHIPPED | OUTPATIENT
Start: 2019-05-06

## 2019-05-06 RX ORDER — ALBUTEROL SULFATE 90 UG/1
1-2 AEROSOL, METERED RESPIRATORY (INHALATION)
Qty: 1 INHALER | Refills: 11 | Status: SHIPPED | OUTPATIENT
Start: 2019-05-06 | End: 2020-09-05

## 2019-05-06 RX ORDER — VARDENAFIL HYDROCHLORIDE 20 MG/1
20 TABLET ORAL
COMMUNITY
Start: 2008-06-09

## 2019-05-06 RX ORDER — NAPROXEN SODIUM 220 MG
220 TABLET ORAL 2 TIMES DAILY WITH MEALS
COMMUNITY

## 2019-05-06 NOTE — PROGRESS NOTES
Christy Goldman presents today for Chief Complaint Patient presents with  Referral / Consult  Lung Mass CT done 2/1/19 & PET/CT done 2/14/19  Shortness of Breath  
  when going up and down stairs at work Is someone accompanying this pt? Yes, Mamadou Xiong patient's partner Is the patient using any DME equipment during OV? No  
 -DME Company N/a Depression Screening: 
3 most recent PHQ Screens 5/6/2019 Little interest or pleasure in doing things Several days Feeling down, depressed, irritable, or hopeless Not at all Total Score PHQ 2 1 Learning Assessment: 
Learning Assessment 5/6/2019 PRIMARY LEARNER Patient HIGHEST LEVEL OF EDUCATION - PRIMARY LEARNER  -  
BARRIERS PRIMARY LEARNER -  
CO-LEARNER CAREGIVER -  
PRIMARY LANGUAGE ENGLISH  
LEARNER PREFERENCE PRIMARY DEMONSTRATION  
  VIDEOS  
ANSWERED BY Lamont Palomo RELATIONSHIP SELF Abuse Screening: 
Abuse Screening Questionnaire 5/6/2019 Do you ever feel afraid of your partner? [place]uba [place] Are you in a relationship with someone who physically or mentally threatens you? Luba [place] Is it safe for you to go home? Samir [place] Fall Risk Fall Risk Assessment, last 12 mths 5/6/2019 Able to walk? Yes Fall in past 12 months? No  
 
 
 
Coordination of Care: 1. Have you been to the ER, urgent care clinic since your last visit? Hospitalized since your last visit? No 
 
2. Have you seen or consulted any other health care providers outside of the [address] [place] [address] since your last visit? Include any pap smears or colon screening. No 
 
Medication variance in dosage/sig per patient as follows: patient taking aleve, and multi vitamin. Called Dr. Ruddy Gonzalez office ([phone]), no answer, left message requesting last office note be faxed to our office.
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
## 2019-05-08 ENCOUNTER — HOSPITAL ENCOUNTER (OUTPATIENT)
Dept: NON INVASIVE DIAGNOSTICS | Age: 59
Discharge: HOME | End: 2019-05-08
Attending: INTERNAL MEDICINE
Payer: COMMERCIAL

## 2019-05-08 DIAGNOSIS — R00.1 BRADYCARDIA: ICD-10-CM

## 2019-05-08 PROCEDURE — 93225 XTRNL ECG REC<48 HRS REC: CPT

## 2019-05-13 NOTE — PROGRESS NOTES
Per your last note'  I would proceed with the Holter monitor to rule out any possibility of pathologic bradycardia

## 2019-05-14 ENCOUNTER — TELEPHONE (OUTPATIENT)
Dept: CARDIOLOGY CLINIC | Age: 59
End: 2019-05-14

## 2019-05-14 NOTE — TELEPHONE ENCOUNTER
----- Message from Monse Richey MD sent at 5/14/2019  9:00 AM EDT -----  Let him know it's ok.  No need for further heart monitoring.  ----- Message -----  From: Kiara Pelayo LPN  Sent: 9/49/1173   8:38 AM  To: Monse Richey MD    Per your last note'   I would proceed with the Holter monitor to rule out any possibility of pathologic bradycardia

## 2019-05-14 NOTE — TELEPHONE ENCOUNTER
Called patient, verified by two identifiers, name and . Patient notified of holter monitor results. All questions answered at time of phone call.

## 2020-01-02 ENCOUNTER — HOSPITAL ENCOUNTER (INPATIENT)
Age: 60
LOS: 8 days | Discharge: HOME | DRG: 881 | End: 2020-01-10
Attending: EMERGENCY MEDICINE | Admitting: PSYCHIATRY & NEUROLOGY
Payer: COMMERCIAL

## 2020-01-02 ENCOUNTER — APPOINTMENT (OUTPATIENT)
Dept: CT IMAGING | Age: 60
DRG: 881 | End: 2020-01-02
Attending: EMERGENCY MEDICINE
Payer: COMMERCIAL

## 2020-01-02 DIAGNOSIS — F32.A DEPRESSION, UNSPECIFIED DEPRESSION TYPE: ICD-10-CM

## 2020-01-02 DIAGNOSIS — M54.50 CHRONIC MIDLINE LOW BACK PAIN WITHOUT SCIATICA: ICD-10-CM

## 2020-01-02 DIAGNOSIS — R45.851 SUICIDAL IDEATION: Primary | ICD-10-CM

## 2020-01-02 DIAGNOSIS — G89.29 CHRONIC MIDLINE LOW BACK PAIN WITHOUT SCIATICA: ICD-10-CM

## 2020-01-02 DIAGNOSIS — F11.10 HEROIN ABUSE (HCC): ICD-10-CM

## 2020-01-02 PROBLEM — F11.20 HEROIN ADDICTION (HCC): Status: ACTIVE | Noted: 2020-01-02

## 2020-01-02 LAB
ALBUMIN SERPL-MCNC: 3.8 G/DL (ref 3.4–5)
ALBUMIN/GLOB SERPL: 0.7 {RATIO} (ref 0.8–1.7)
ALP SERPL-CCNC: 87 U/L (ref 45–117)
ALT SERPL-CCNC: 19 U/L (ref 16–61)
AMPHET UR QL SCN: NEGATIVE
ANION GAP SERPL CALC-SCNC: 5 MMOL/L (ref 3–18)
APPEARANCE UR: CLEAR
AST SERPL-CCNC: 17 U/L (ref 10–38)
BACTERIA URNS QL MICRO: ABNORMAL /HPF
BARBITURATES UR QL SCN: NEGATIVE
BASOPHILS # BLD: 0 K/UL (ref 0–0.1)
BASOPHILS NFR BLD: 1 % (ref 0–2)
BENZODIAZ UR QL: NEGATIVE
BILIRUB SERPL-MCNC: 0.6 MG/DL (ref 0.2–1)
BILIRUB UR QL: ABNORMAL
BUN SERPL-MCNC: 18 MG/DL (ref 7–18)
BUN/CREAT SERPL: 13 (ref 12–20)
CALCIUM SERPL-MCNC: 10 MG/DL (ref 8.5–10.1)
CANNABINOIDS UR QL SCN: NEGATIVE
CHLORIDE SERPL-SCNC: 108 MMOL/L (ref 100–111)
CO2 SERPL-SCNC: 29 MMOL/L (ref 21–32)
COCAINE UR QL SCN: POSITIVE
COLOR UR: ABNORMAL
CREAT SERPL-MCNC: 1.34 MG/DL (ref 0.6–1.3)
DIFFERENTIAL METHOD BLD: ABNORMAL
EOSINOPHIL # BLD: 0 K/UL (ref 0–0.4)
EOSINOPHIL NFR BLD: 0 % (ref 0–5)
EPITH CASTS URNS QL MICRO: NEGATIVE /LPF (ref 0–5)
ERYTHROCYTE [DISTWIDTH] IN BLOOD BY AUTOMATED COUNT: 14.5 % (ref 11.6–14.5)
ETHANOL SERPL-MCNC: <3 MG/DL (ref 0–3)
GLOBULIN SER CALC-MCNC: 5.4 G/DL (ref 2–4)
GLUCOSE SERPL-MCNC: 130 MG/DL (ref 74–99)
GLUCOSE UR STRIP.AUTO-MCNC: NEGATIVE MG/DL
HCT VFR BLD AUTO: 44.7 % (ref 36–48)
HDSCOM,HDSCOM: ABNORMAL
HGB BLD-MCNC: 14.2 G/DL (ref 13–16)
HGB UR QL STRIP: NEGATIVE
KETONES UR QL STRIP.AUTO: 15 MG/DL
LEUKOCYTE ESTERASE UR QL STRIP.AUTO: NEGATIVE
LYMPHOCYTES # BLD: 1.8 K/UL (ref 0.9–3.6)
LYMPHOCYTES NFR BLD: 22 % (ref 21–52)
MCH RBC QN AUTO: 26.3 PG (ref 24–34)
MCHC RBC AUTO-ENTMCNC: 31.8 G/DL (ref 31–37)
MCV RBC AUTO: 82.9 FL (ref 74–97)
METHADONE UR QL: NEGATIVE
MONOCYTES # BLD: 0.5 K/UL (ref 0.05–1.2)
MONOCYTES NFR BLD: 5 % (ref 3–10)
MUCOUS THREADS URNS QL MICRO: ABNORMAL /LPF
NEUTS SEG # BLD: 6.2 K/UL (ref 1.8–8)
NEUTS SEG NFR BLD: 72 % (ref 40–73)
NITRITE UR QL STRIP.AUTO: NEGATIVE
OPIATES UR QL: POSITIVE
PCP UR QL: NEGATIVE
PH UR STRIP: 6 [PH] (ref 5–8)
PLATELET # BLD AUTO: 219 K/UL (ref 135–420)
PMV BLD AUTO: 12.2 FL (ref 9.2–11.8)
POTASSIUM SERPL-SCNC: 4.1 MMOL/L (ref 3.5–5.5)
PROT SERPL-MCNC: 9.2 G/DL (ref 6.4–8.2)
PROT UR STRIP-MCNC: 30 MG/DL
RBC # BLD AUTO: 5.39 M/UL (ref 4.7–5.5)
RBC #/AREA URNS HPF: NEGATIVE /HPF (ref 0–5)
SODIUM SERPL-SCNC: 142 MMOL/L (ref 136–145)
SP GR UR REFRACTOMETRY: >1.03 (ref 1–1.03)
UROBILINOGEN UR QL STRIP.AUTO: 1 EU/DL (ref 0.2–1)
WBC # BLD AUTO: 8.5 K/UL (ref 4.6–13.2)
WBC URNS QL MICRO: ABNORMAL /HPF (ref 0–4)

## 2020-01-02 PROCEDURE — 85025 COMPLETE CBC W/AUTO DIFF WBC: CPT

## 2020-01-02 PROCEDURE — 74177 CT ABD & PELVIS W/CONTRAST: CPT

## 2020-01-02 PROCEDURE — 74011250637 HC RX REV CODE- 250/637: Performed by: EMERGENCY MEDICINE

## 2020-01-02 PROCEDURE — 99284 EMERGENCY DEPT VISIT MOD MDM: CPT

## 2020-01-02 PROCEDURE — 74011250637 HC RX REV CODE- 250/637: Performed by: PSYCHIATRY & NEUROLOGY

## 2020-01-02 PROCEDURE — 65220000005 HC RM SEMIPRIVATE PSYCH 3 OR 4 BED

## 2020-01-02 PROCEDURE — 74011636320 HC RX REV CODE- 636/320: Performed by: EMERGENCY MEDICINE

## 2020-01-02 PROCEDURE — 80307 DRUG TEST PRSMV CHEM ANLYZR: CPT

## 2020-01-02 PROCEDURE — 74011250636 HC RX REV CODE- 250/636: Performed by: EMERGENCY MEDICINE

## 2020-01-02 PROCEDURE — 81001 URINALYSIS AUTO W/SCOPE: CPT

## 2020-01-02 PROCEDURE — 74011250636 HC RX REV CODE- 250/636: Performed by: PSYCHIATRY & NEUROLOGY

## 2020-01-02 PROCEDURE — 80053 COMPREHEN METABOLIC PANEL: CPT

## 2020-01-02 PROCEDURE — 96374 THER/PROPH/DIAG INJ IV PUSH: CPT

## 2020-01-02 RX ORDER — HALOPERIDOL 5 MG/1
5 TABLET ORAL
Status: DISCONTINUED | OUTPATIENT
Start: 2020-01-02 | End: 2020-01-10 | Stop reason: HOSPADM

## 2020-01-02 RX ORDER — DICYCLOMINE HYDROCHLORIDE 10 MG/1
10 CAPSULE ORAL
Status: DISCONTINUED | OUTPATIENT
Start: 2020-01-02 | End: 2020-01-10 | Stop reason: HOSPADM

## 2020-01-02 RX ORDER — CLONIDINE HYDROCHLORIDE 0.1 MG/1
0.1 TABLET ORAL
Status: DISCONTINUED | OUTPATIENT
Start: 2020-01-02 | End: 2020-01-08

## 2020-01-02 RX ORDER — TRAZODONE HYDROCHLORIDE 50 MG/1
50 TABLET ORAL
Status: DISCONTINUED | OUTPATIENT
Start: 2020-01-02 | End: 2020-01-04

## 2020-01-02 RX ORDER — HYDROXYZINE PAMOATE 50 MG/1
50 CAPSULE ORAL
Status: DISCONTINUED | OUTPATIENT
Start: 2020-01-02 | End: 2020-01-10 | Stop reason: HOSPADM

## 2020-01-02 RX ORDER — IBUPROFEN 400 MG/1
800 TABLET ORAL
Status: COMPLETED | OUTPATIENT
Start: 2020-01-02 | End: 2020-01-02

## 2020-01-02 RX ORDER — KETOROLAC TROMETHAMINE 15 MG/ML
15 INJECTION, SOLUTION INTRAMUSCULAR; INTRAVENOUS
Status: COMPLETED | OUTPATIENT
Start: 2020-01-02 | End: 2020-01-02

## 2020-01-02 RX ORDER — IBUPROFEN 400 MG/1
800 TABLET ORAL
Status: DISCONTINUED | OUTPATIENT
Start: 2020-01-02 | End: 2020-01-02

## 2020-01-02 RX ORDER — HALOPERIDOL 5 MG/ML
5 INJECTION INTRAMUSCULAR
Status: DISCONTINUED | OUTPATIENT
Start: 2020-01-02 | End: 2020-01-10 | Stop reason: HOSPADM

## 2020-01-02 RX ORDER — LOPERAMIDE HYDROCHLORIDE 2 MG/1
2 CAPSULE ORAL
Status: DISCONTINUED | OUTPATIENT
Start: 2020-01-02 | End: 2020-01-08

## 2020-01-02 RX ORDER — IBUPROFEN 400 MG/1
800 TABLET ORAL
Status: DISCONTINUED | OUTPATIENT
Start: 2020-01-02 | End: 2020-01-10 | Stop reason: HOSPADM

## 2020-01-02 RX ADMIN — HYDROXYZINE PAMOATE 50 MG: 50 CAPSULE ORAL at 22:54

## 2020-01-02 RX ADMIN — IBUPROFEN 800 MG: 400 TABLET ORAL at 22:04

## 2020-01-02 RX ADMIN — LOPERAMIDE HYDROCHLORIDE 2 MG: 2 CAPSULE ORAL at 22:53

## 2020-01-02 RX ADMIN — TRAZODONE HYDROCHLORIDE 50 MG: 50 TABLET ORAL at 22:55

## 2020-01-02 RX ADMIN — DICYCLOMINE HYDROCHLORIDE 10 MG: 10 CAPSULE ORAL at 22:53

## 2020-01-02 RX ADMIN — KETOROLAC TROMETHAMINE 15 MG: 15 INJECTION, SOLUTION INTRAMUSCULAR; INTRAVENOUS at 18:08

## 2020-01-02 RX ADMIN — IBUPROFEN 800 MG: 400 TABLET ORAL at 22:54

## 2020-01-02 RX ADMIN — TRIMETHOBENZAMIDE HYDROCHLORIDE 200 MG: 100 INJECTION INTRAMUSCULAR at 23:09

## 2020-01-02 RX ADMIN — IOPAMIDOL 100 ML: 612 INJECTION, SOLUTION INTRAVENOUS at 17:28

## 2020-01-02 RX ADMIN — CLONIDINE HYDROCHLORIDE 0.1 MG: 0.1 TABLET ORAL at 22:53

## 2020-01-02 NOTE — ED PROVIDER NOTES
EMERGENCY DEPARTMENT HISTORY AND PHYSICAL EXAM    Date: 1/2/2020  Patient Name: Leanna Argueta    History of Presenting Illness     Chief Complaint   Patient presents with    Drug Problem         History Provided By: Patient and Patient's Wife      Additional History (Context): Leanna Argueta is a 61 y.o. male with heroin abuse, depression who presents with longstanding history of heroin use depression is now suicidal.  Patient said he had back pain starting from an injury many many years ago and occasionally is. He denies IVDU. Says he has had a lot of discomfort in his lower back since stopping using heroin since Monday. He uses at least 10-12 times a day. Denies alcohol. He is a  at the [address]. PCP: Amelia Melton MD    Current Facility-Administered Medications   Medication Dose Route Frequency Provider Last Rate Last Dose    iopamidol (ISOVUE 300) 61 % contrast injection  mL   mL IntraVENous RAD ONCE Deyanira Schafer,         ketorolac (TORADOL) injection 15 mg  15 mg IntraVENous NOW Michelle Gomez PA         Current Outpatient Medications   Medication Sig Dispense Refill    vardenafil (LEVITRA) 20 mg tablet Take 20 mg by mouth.  multivitamin, tx-iron-ca-min (THERA-M W/ IRON) 9 mg iron-400 mcg tab tablet Take 1 Tab by mouth daily.  naproxen sodium (ALEVE) 220 mg tablet Take 220 mg by mouth two (2) times daily (with meals).  nicotine buccal (POLACRILEX) 2 mg lozg lozenge Take 1 Lozenge by mouth every one (1) hour as needed for Smoking Cessation. 300 Lozenge 1    umeclidinium-vilanterol (ANORO ELLIPTA) 62.5-25 mcg/actuation inhaler Take 1 Puff by inhalation daily. 3 Inhaler 3    albuterol (PROVENTIL HFA, VENTOLIN HFA, PROAIR HFA) 90 mcg/actuation inhaler Take 1-2 Puffs by inhalation every four (4) hours as needed for Wheezing or Shortness of Breath.  1 Inhaler 11    umeclidinium-vilanterol (ANORO ELLIPTA) 62.5-25 mcg/actuation inhaler Take 1 Puff by inhalation daily. 1 Inhaler 0    calcium carb/magnesium hydrox (ROLAIDS PO) Take  by mouth. Past History     Past Medical History:  Past Medical History:   Diagnosis Date    Acid indigestion     Anemia NEC     Back pain     Cardiomegaly     1/12 LVH on EKG    Chest pain, unspecified     Drug use     Heroin causing adverse effect in therapeutic use     Irregular heart beat     Nonspecific abnormal electrocardiogram (ECG) (EKG)     S Ronak, RBBB    Pulmonary nodule     Tachycardia     Tightness in chest     Tiredness     Tobacco use disorder        Past Surgical History:  Past Surgical History:   Procedure Laterality Date    HX CIRCUMCISION      HX CIRCUMCISION      HX OTHER SURGICAL      HX TORSION OF THE TESTES    HX OTHER SURGICAL  2007 approx    lung biopsy       Family History:  Family History   Problem Relation Age of Onset    Diabetes Mother     Hypertension Mother     Thyroid Disease Mother     High Cholesterol Mother     Cancer Father     Heart Disease Father     Hypertension Father     Diabetes Sister     Hypertension Sister     Hypertension Brother     Hypertension Sister     High Cholesterol Sister        Social History:  Social History     Tobacco Use    Smoking status: Current Every Day Smoker     Packs/day: 1.00     Years: 34.00     Pack years: 34.00     Types: Cigarettes    Smokeless tobacco: Never Used   Substance Use Topics    Alcohol use: No     Alcohol/week: 0.8 standard drinks     Types: 1 Cans of beer per week     Comment: occasional    Drug use: Yes     Types: Marijuana, Heroin, Cocaine       Allergies:  No Known Allergies      Review of Systems   Review of Systems   Constitutional: Positive for chills. Negative for fever and unexpected weight change. Genitourinary: Negative for difficulty urinating. Musculoskeletal: Positive for back pain. Skin: Negative for rash and wound. Neurological: Negative for weakness and numbness.      All Other Systems Negative  Physical Exam     Vitals:    01/02/20 1249   BP: 117/73   Pulse: (!) 54   Resp: 12   Temp: 97.7 °F (36.5 °C)   SpO2: 100%   Weight: 79.4 kg (175 lb)   Height: 5' 9\" (1.753 m)     Physical Exam  Vitals signs and nursing note reviewed. Constitutional:       General: He is not in acute distress. Appearance: He is well-developed. He is not ill-appearing, toxic-appearing or diaphoretic. HENT:      Head: Normocephalic and atraumatic. Neck:      Musculoskeletal: Normal range of motion and neck supple. Thyroid: No thyromegaly. Vascular: No carotid bruit. Trachea: No tracheal deviation. Cardiovascular:      Rate and Rhythm: Normal rate and regular rhythm. Heart sounds: Normal heart sounds. No murmur. No friction rub. No gallop. Pulmonary:      Effort: Pulmonary effort is normal. No respiratory distress. Breath sounds: Normal breath sounds. No stridor. No wheezing or rales. Chest:      Chest wall: No tenderness. Abdominal:      General: There is no distension. Palpations: Abdomen is soft. There is no mass. Tenderness: There is no tenderness. There is no guarding or rebound. Comments: No pulsatile mass palpated. Musculoskeletal: Normal range of motion. General: Tenderness present. Comments: Midline mid to lower lumbar spine tenderness. There is a cystic area in the inferior thoracic spine midline; its least 4 to 5 cm diameter. Feels deep-seated. This is chronic. There are no open lesions warmth redness or other swellings along the spinal area. Skin:     General: Skin is warm and dry. Coloration: Skin is not pale. Neurological:      Mental Status: He is alert. Psychiatric:         Speech: Speech normal.         Behavior: Behavior normal.         Thought Content:  Thought content normal.         Judgment: Judgment normal.            Diagnostic Study Results     Labs -     Recent Results (from the past 12 hour(s)) URINALYSIS W/ RFLX MICROSCOPIC    Collection Time: 01/02/20 12:57 PM   Result Value Ref Range    Color DARK YELLOW      Appearance CLEAR      Specific gravity >1.030 (H) 1.005 - 1.030    pH (UA) 6.0 5.0 - 8.0      Protein 30 (A) NEG mg/dL    Glucose NEGATIVE  NEG mg/dL    Ketone 15 (A) NEG mg/dL    Bilirubin SMALL (A) NEG      Blood NEGATIVE  NEG      Urobilinogen 1.0 0.2 - 1.0 EU/dL    Nitrites NEGATIVE  NEG      Leukocyte Esterase NEGATIVE  NEG     DRUG SCREEN, URINE    Collection Time: 01/02/20 12:57 PM   Result Value Ref Range    BENZODIAZEPINES NEGATIVE  NEG      BARBITURATES NEGATIVE  NEG      THC (TH-CANNABINOL) NEGATIVE  NEG      OPIATES POSITIVE (A) NEG      PCP(PHENCYCLIDINE) NEGATIVE  NEG      COCAINE POSITIVE (A) NEG      AMPHETAMINES NEGATIVE  NEG      METHADONE NEGATIVE  NEG      HDSCOM (NOTE)    URINE MICROSCOPIC ONLY    Collection Time: 01/02/20 12:57 PM   Result Value Ref Range    WBC 0 to 2 0 - 4 /hpf    RBC NEGATIVE  0 - 5 /hpf    Epithelial cells NEGATIVE  0 - 5 /lpf    Bacteria FEW (A) NEG /hpf    Mucus 3+ (A) NEG /lpf   METABOLIC PANEL, COMPREHENSIVE    Collection Time: 01/02/20  1:31 PM   Result Value Ref Range    Sodium 142 136 - 145 mmol/L    Potassium 4.1 3.5 - 5.5 mmol/L    Chloride 108 100 - 111 mmol/L    CO2 29 21 - 32 mmol/L    Anion gap 5 3.0 - 18 mmol/L    Glucose 130 (H) 74 - 99 mg/dL    BUN 18 7.0 - 18 MG/DL    Creatinine 1.34 (H) 0.6 - 1.3 MG/DL    BUN/Creatinine ratio 13 12 - 20      GFR est AA >60 >60 ml/min/1.73m2    GFR est non-AA 55 (L) >60 ml/min/1.73m2    Calcium 10.0 8.5 - 10.1 MG/DL    Bilirubin, total 0.6 0.2 - 1.0 MG/DL    ALT (SGPT) 19 16 - 61 U/L    AST (SGOT) 17 10 - 38 U/L    Alk.  phosphatase 87 45 - 117 U/L    Protein, total 9.2 (H) 6.4 - 8.2 g/dL    Albumin 3.8 3.4 - 5.0 g/dL    Globulin 5.4 (H) 2.0 - 4.0 g/dL    A-G Ratio 0.7 (L) 0.8 - 1.7     CBC WITH AUTOMATED DIFF    Collection Time: 01/02/20  1:31 PM   Result Value Ref Range    WBC 8.5 4.6 - 13.2 K/uL    RBC 5. 39 4.70 - 5.50 M/uL    HGB 14.2 13.0 - 16.0 g/dL    HCT 44.7 36.0 - 48.0 %    MCV 82.9 74.0 - 97.0 FL    MCH 26.3 24.0 - 34.0 PG    MCHC 31.8 31.0 - 37.0 g/dL    RDW 14.5 11.6 - 14.5 %    PLATELET 768 782 - 867 K/uL    MPV 12.2 (H) 9.2 - 11.8 FL    NEUTROPHILS 72 40 - 73 %    LYMPHOCYTES 22 21 - 52 %    MONOCYTES 5 3 - 10 %    EOSINOPHILS 0 0 - 5 %    BASOPHILS 1 0 - 2 %    ABS. NEUTROPHILS 6.2 1.8 - 8.0 K/UL    ABS. LYMPHOCYTES 1.8 0.9 - 3.6 K/UL    ABS. MONOCYTES 0.5 0.05 - 1.2 K/UL    ABS. EOSINOPHILS 0.0 0.0 - 0.4 K/UL    ABS. BASOPHILS 0.0 0.0 - 0.1 K/UL    DF AUTOMATED     ETHYL ALCOHOL    Collection Time: 01/02/20  1:31 PM   Result Value Ref Range    ALCOHOL(ETHYL),SERUM <3 0 - 3 MG/DL       Radiologic Studies -   CT ABD PELV W CONT    (Results Pending)     CT Results  (Last 48 hours)    None        CXR Results  (Last 48 hours)    None            Medical Decision Making   I am the first provider for this patient. I reviewed the vital signs, available nursing notes, past medical history, past surgical history, family history and social history. Vital Signs-Reviewed the patient's vital signs. Records Reviewed: Nursing Notes, Old Medical Records and Previous Radiology Studies    Procedures:  Left AC IV placement  Date/Time: 1/2/2020 5:22 PM  Performed by: STEVE Alatorre  Authorized by: STEVE Alatorre     Consent:     Consent obtained:  Verbal    Consent given by:  Patient    Risks discussed:  Pain and bleeding    Alternatives discussed:  Alternative treatment  Indications:     Indications:  Needs peripheral iv for CT scan  Pre-procedure details:     Skin preparation:  ChloraPrep    Preparation: Patient was prepped and draped in the usual sterile fashion    Anesthesia (see MAR for exact dosages): Anesthesia method:  None  Post-procedure details:     Patient tolerance of procedure:   Tolerated well, no immediate complications        Provider Notes (Medical Decision Making): Patient depressed suicidal and wants to detox from heroin. He had a CT scan which was abnormal approximately 1 year ago. He was evaluated for possibility of non-Hodgkin's lymphoma. He was referred to a specialist out of visit and there was supposed to get additional imaging and did not. He is symptomatic for back pain though insists he does not use IV heroin. Get a CT scan. He reports chills but there is no fever here vital signs are stable no white count. Will call crisis after this.    5:23 PM : Pt care transferred to [facility] provider. History of patient complaint(s), available diagnostic reports and current treatment plan has been discussed thoroughly. Bedside rounding on patient occured : no . Intended disposition of patient :admit  Pending diagnostics reports and/or labs (please list): CT scan, crisis seval, disposition    5:28 PM  Crisis made aware pt is available for evaluation. MED RECONCILIATION:  Current Facility-Administered Medications   Medication Dose Route Frequency    iopamidol (ISOVUE 300) 61 % contrast injection  mL   mL IntraVENous RAD ONCE    ketorolac (TORADOL) injection 15 mg  15 mg IntraVENous NOW     Current Outpatient Medications   Medication Sig    vardenafil (LEVITRA) 20 mg tablet Take 20 mg by mouth.  multivitamin, tx-iron-ca-min (THERA-M W/ IRON) 9 mg iron-400 mcg tab tablet Take 1 Tab by mouth daily.  naproxen sodium (ALEVE) 220 mg tablet Take 220 mg by mouth two (2) times daily (with meals).  nicotine buccal (POLACRILEX) 2 mg lozg lozenge Take 1 Lozenge by mouth every one (1) hour as needed for Smoking Cessation.  umeclidinium-vilanterol (ANORO ELLIPTA) 62.5-25 mcg/actuation inhaler Take 1 Puff by inhalation daily.  albuterol (PROVENTIL HFA, VENTOLIN HFA, PROAIR HFA) 90 mcg/actuation inhaler Take 1-2 Puffs by inhalation every four (4) hours as needed for Wheezing or Shortness of Breath.     umeclidinium-vilanterol (ANORO ELLIPTA) 62.5-25 mcg/actuation inhaler Take 1 Puff by inhalation daily.  calcium carb/magnesium hydrox (ROLAIDS PO) Take  by mouth. Disposition:  TBD      Follow-up Information    None         Current Discharge Medication List            Diagnosis     Clinical Impression:   1. Suicidal ideation    2. Depression, unspecified depression type    3. Heroin abuse ([facility] [address]    4.  Chronic midline low back pain without sciatica

## 2020-01-02 NOTE — ED TRIAGE NOTES
\"I need help kicking my drug habit. \"  His drugs of choice are Heroin and Cocaine. Last used Monday.

## 2020-01-03 LAB
ATRIAL RATE: 55 BPM
CALCULATED P AXIS, ECG09: 67 DEGREES
CALCULATED R AXIS, ECG10: 79 DEGREES
CALCULATED T AXIS, ECG11: 58 DEGREES
DIAGNOSIS, 93000: NORMAL
P-R INTERVAL, ECG05: 148 MS
Q-T INTERVAL, ECG07: 422 MS
QRS DURATION, ECG06: 148 MS
QTC CALCULATION (BEZET), ECG08: 403 MS
VENTRICULAR RATE, ECG03: 55 BPM

## 2020-01-03 PROCEDURE — 93005 ELECTROCARDIOGRAM TRACING: CPT

## 2020-01-03 PROCEDURE — 74011250637 HC RX REV CODE- 250/637: Performed by: PSYCHIATRY & NEUROLOGY

## 2020-01-03 PROCEDURE — 65220000005 HC RM SEMIPRIVATE PSYCH 3 OR 4 BED

## 2020-01-03 RX ORDER — ESCITALOPRAM OXALATE 10 MG/1
10 TABLET ORAL DAILY
Status: DISCONTINUED | OUTPATIENT
Start: 2020-01-03 | End: 2020-01-10 | Stop reason: HOSPADM

## 2020-01-03 RX ADMIN — CLONIDINE HYDROCHLORIDE 0.1 MG: 0.1 TABLET ORAL at 20:47

## 2020-01-03 RX ADMIN — IBUPROFEN 800 MG: 400 TABLET ORAL at 16:08

## 2020-01-03 RX ADMIN — TRAZODONE HYDROCHLORIDE 50 MG: 50 TABLET ORAL at 20:47

## 2020-01-03 NOTE — H&P
[facility] HISTORY AND PHYSICAL    Name:  Chandni Jorge  MR#:   [id number]  :  1960  ACCOUNT #:  [de-identified]  ADMIT DATE:  2020    IDENTIFYING INFORMATION:  The patient is a 49-year-old male, admitted to this facility on a voluntary basis on the above-mentioned date. BASIS FOR ADMISSION:  Admitted after he presented himself to [facility] [place] Emergency Department with a history of opioid use disorder and depression, the patient was described at the time of the evaluation by the ER staff as being not only depressed but also suicidal.  The patient upon evaluation denies use of intravenous drugs, however, described his use of opioids beginning when he was 25years of age. Since then, it appears that the longest that he has ever been able to maintain some type of cleanliness was at the time in which he was in prison for one year. Regardless, concerns were raised about the patient's depression and its severity, the same as his need for detoxification with the case being presented to the physician on-call who kindly admitted the patient to my service and so the basis for this admission. PSYCHIATRIC HISTORY:  The patient has had a chronic history of opioid disorder as I have above stated, which has been his drug of choice. By chart review, most of the consultations that I found were related to multiple medical problems including a history of bradycardia, history of a lung nodule, the same as a prior history of thyroid disease with multinodular goiter being mentioned; however, none of the consultations with exception of once when he was admitted to the care of Dr. Erlin Guzman to inpatient treatment because of opioid withdrawal symptoms and also depression (not clear as to why was he admitted then medically rather than psychiatrically) were found by chart review.     The patient did mention a history of depression which appears to be multifactorial, but also associated to his drug use. Problems with his family of origin mention the same as the fact that he has been using opioids for such a long time that now he is \"tired of continuing to use them. \"  The patient has the precedent of his being in the methadone program.  However, like he said, \"using methadone is only using another drug. It helped me when I was on that; however, due to my work, I needed to leave town and so they canceled my attendance in the methadone program, this happening two or three years ago. \"  The patient also has not considered the use of Suboxone for the same reason. Nonetheless, the main reason for hospitalization has been his depression which is characterized by helplessness, hopelessness and recurrent suicidal thoughts. The patient denied having any psychotic symptoms in association to his depressive disorder. However, his depression is considered to be rather severe by his own description. MEDICAL HISTORY:  Attention is invited to the history and physical performed at the time of the patient's evaluation at the emergency department, the same as the H and P performed by Ellyn Casillas, nurse practitioner, since the patient's admission here in our facility. The patient's medical history is remarkable for history of back pain, cardiomegaly, sinus brisa, a history of pulmonary nodule with a history of a lung biopsy; however, no specific results mentioned in the patient's medical history, specifically in regards to the results of the lung biopsy. He has also history of a torsion of the testicles with the patient's lung biopsy happening on or about 2007, 12 years or so ago. He has been smoking a pack of cigarettes daily. LABORATORY DATA:  Multiple labs were performed since the patient's evaluation at the emergency department including a CBC with differential that showed normal results. The urinalysis showed small amount of bilirubin, otherwise negative test results.   Blood chemistry showed normal electrolytes with sodium 142, potassium 4.1, chloride of 108, BUN 18, creatinine 1.34 with an estimated GFR of above 60 mL/minute. Liver function test showed normal enzymes with mild elevation of globulin to 5.4 and total protein to 9.2. Normal ALT, AST and also alkaline phosphatase. Alcohol levels below 3. Blood sugar 130 mg/dL, this test appeared to be postprandial.    A CT scan of the abdomen done due to abdominal pain showed no evidence of acute pathology; however, it showed mild prominence of the proximal duodenum prior to crossing the SMA. There is narrowing of the SMA. This can be associated with SMA syndrome, correlation is recommended. The presence of calcified pleural disease seen at the prior imaging also described. ALCOHOL AND DRUG HISTORY:  As stated above, the patient's main drug of choice has been opioids throughout the years. He began to use when he was around 25years of age. Using off and on, the patient has been on the methadone program before, however, does not want to return back there again. He also does not want to be evaluated for Suboxone use. During the evaluation, the patient is currently denying the use of other drugs; however, his urine drug screen was positive for cocaine and opiates. His cocaine use is very sporadic. The main drug has been always opioids as stated. He also described drinking on occasion; however, alcohol is not an issue for him. He denies abusing prescription medications and denies abusing over-the-counter medications. PERSONAL HISTORY AND FAMILY HISTORY:  The patient's father is . He did die of some pulmonary complications; he was an alcoholic. Mother is alive at 80. Older sister was an opioid addict who has been clean for a while. The patient has been with his girlfriend for 30 years off and on. He is residing with her now.   He would be able to return back to her home after discharge from the hospital.  He has two sons, one in [place] with his own family and one in the area. The patient sees them every so often. MENTAL STATUS EXAM:  This is a male who looks his stated age. He is complaining of abdominal cramping, presence of gooseflesh noted. The patient described also feeling \"cold and hot off and on. \"  During the evaluation, the patient showed no evidence of psychosis. He is coherent, showing quality of continuity of associations without evidence of flight of ideas and/or pressure of speech. However, he is found to be rather depressed with his depression characterized by helplessness, hopelessness, anhedonia and a very low self-esteem. Recurrent suicidal thoughts present. The patient is able, willing and capable to talk to the staff if unable to control thoughts of self-harm. No evidence of neurocognitive impairment noted upon examination. Insight and judgment are currently appropriate, and the patient is considered to be psychiatrically competent. ASSETS:  Voluntary admission, willing to take medications. WEAKNESSES:  Chronic opioid use disorder, depression. CLINICAL IMPRESSION:  AXIS I:  Depressive disorder, not otherwise specified. Opioid use disorder, severe. Opioid withdrawal symptoms, moderate. Cocaine use disorder, moderate. Rule out opioid-induced mood disorder. AXIS II:  Deferred. AXIS III:  Remote history of Bell's palsy. Chronic smoker. Chronic history of back pain secondary to trauma. Prior history of cardiomegaly and sinus bradycardia. Pulmonary nodule by history with status post biopsy, remote. History of thyroid nodular disease. History of torsion of the testicles. TREATMENT PLAN:  1. The patient was admitted to the adult CD program, will be seen daily and will be referred to the groups within context of the program.  2.  The patient has been prescribed following the COWS protocol. In addition for his depression, he will be prescribed with citalopram as indicated.   3.  Side effects and adverse effects in association to treatment with antidepressants clearly described to the patient with consent obtained. 4.  We will observe very closely for any evidence of self-harming behaviors and/or harming to others. ESTIMATED LENGTH OF STAY AND PROGNOSIS:  ELOS is five days. Prognosis will depend upon treatment response and treatment compliance.       Augustina Mckeon MD      [id number]  D:  01/03/2020 11:25  T:  01/03/2020 17:38  JOB #:  [id number]

## 2020-01-03 NOTE — H&P
History and Physical    Patient: Gordy Marcos MRN: [id number]  SSN: [id number]    YOB: 1960  Age: 61 y.o. Sex: male      Subjective:      Gordy Marcos is a 61 y.o. male who was admitted experiencing depression and suicidal ideation with a plan to overdose on pills. .     Past Medical History:   Diagnosis Date    Acid indigestion     Anemia NEC     Back pain     Cardiomegaly     1/12 LVH on EKG    Chest pain, unspecified     Drug use     Heroin causing adverse effect in therapeutic use     Irregular heart beat     Nonspecific abnormal electrocardiogram (ECG) (EKG)     S Ronak, RBBB    Pulmonary nodule     Tachycardia     Tightness in chest     Tiredness     Tobacco use disorder      Past Surgical History:   Procedure Laterality Date    HX CIRCUMCISION      HX CIRCUMCISION      HX OTHER SURGICAL      HX TORSION OF THE TESTES    HX OTHER SURGICAL  2007 approx    lung biopsy      Family History   Problem Relation Age of Onset    Diabetes Mother     Hypertension Mother     Thyroid Disease Mother     High Cholesterol Mother     Cancer Father     Heart Disease Father     Hypertension Father     Diabetes Sister     Hypertension Sister     Hypertension Brother     Hypertension Sister     High Cholesterol Sister      Social History     Tobacco Use    Smoking status: Current Every Day Smoker     Packs/day: 1.00     Years: 34.00     Pack years: 34.00     Types: Cigarettes    Smokeless tobacco: Never Used   Substance Use Topics    Alcohol use: No     Alcohol/week: 0.8 standard drinks     Types: 1 Cans of beer per week     Comment: occasional      Prior to Admission medications    Medication Sig Start Date End Date Taking? Authorizing Provider   vardenafil (LEVITRA) 20 mg tablet Take 20 mg by mouth. 6/9/08   Provider, Historical   multivitamin, tx-iron-ca-min (THERA-M W/ IRON) 9 mg iron-400 mcg tab tablet Take 1 Tab by mouth daily.     Provider, Historical   naproxen sodium (ALEVE) 220 mg tablet Take 220 mg by mouth two (2) times daily (with meals). Provider, Historical   nicotine buccal (POLACRILEX) 2 mg lozg lozenge Take 1 Lozenge by mouth every one (1) hour as needed for Smoking Cessation. 5/6/19   MD araceli Jacksonium-vilanterol [facility]) 62.5-25 mcg/actuation inhaler Take 1 Puff by inhalation daily. 5/6/19   Jorge Weaver MD   albuterol (PROVENTIL HFA, VENTOLIN HFA, PROAIR HFA) 90 mcg/actuation inhaler Take 1-2 Puffs by inhalation every four (4) hours as needed for Wheezing or Shortness of Breath. 5/6/19   MD araceli Jacksonium-vilanterol [facility]) 62.5-25 mcg/actuation inhaler Take 1 Puff by inhalation daily. 5/6/19   Jorge Weaver MD   calcium carb/magnesium hydrox (ROLAIDS PO) Take  by mouth. Provider, Historical        No Known Allergies    Review of Systems:  A comprehensive review of systems was negative except for that written in the History of Present Illness. Objective:     Vitals:    01/02/20 2150 01/02/20 2242 01/02/20 2252 01/03/20 0813   BP: 113/76 129/78 124/82 119/82   Pulse: (!) 57 (!) 57 62 62   Resp: 16 16 16 18   Temp: 97.2 °F (36.2 °C) 99.7 °F (37.6 °C) 99.5 °F (37.5 °C) 97.7 °F (36.5 °C)   SpO2: 100%      Weight:       Height:            Physical Exam:  General:  Alert, cooperative, no distress, appears stated age. Eyes:  Conjunctivae/corneas clear. PERRL, EOMs intact. Fundi benign   Ears:  Normal TMs and external ear canals both ears. Nose: Nares normal. Septum midline. Mucosa normal. No drainage or sinus tenderness. Mouth/Throat: Lips, mucosa, and tongue normal. Teeth and gums normal.   Neck: Supple, symmetrical, trachea midline, no adenopathy, thyroid: no enlargment/tenderness/nodules, no carotid bruit and no JVD. Back:   Symmetric, no curvature. ROM normal. No CVA tenderness. Lungs:   Clear to auscultation bilaterally.    Heart:  Regular rate and rhythm, S1, S2 normal, no murmur, click, rub or gallop. Abdomen:   Soft, non-tender. Bowel sounds normal. No masses,  No organomegaly. Extremities: Extremities normal, atraumatic, no cyanosis or edema. Pulses: 2+ and symmetric all extremities. Skin: Skin color, texture, turgor normal. No rashes or lesions   Lymph nodes: Cervical, supraclavicular, and axillary nodes normal.   Neurologic: CNII-XII intact. Normal strength, sensation and reflexes throughout. Assessment:     Hospital Problems  Date Reviewed: 2/13/2017          Codes Class Noted POA    Depression ICD-10-CM: F32.9  ICD-9-CM: 655  1/2/2020 Unknown        Heroin addiction ([facility] [address] ICD-10-CM: F11.20  ICD-9-CM: 304.00  1/2/2020 Unknown              Plan:     Patient will follow inpatient doctor's orders, attend groups and other scheduled activities and take all medications as prescribed. . After discharge, he will keep all appointments and take medication as prescribed.     Signed By: Jameel Mesa NP     January 3, 2020

## 2020-01-03 NOTE — BSMART NOTE
Comprehensive Assessment Form     Disposition    Discussed with Mely Sierra, the plan is admit. The on-call Psychiatrist consulted was Dr. Carlos Thomas. The admitting Diagnosis is Depression, Heroin Addition. Admitted to room 126-1  Unit: ADCD    Integrated Summary    Patient is a 61year old male who presented to the emergency room with c/o \"feeling sad, depressed with suicidal thoughts and a plan to take a lot of pills. \" Patient also said that he needs help with heroin and cocaine addiction. Patient said that he snorts \"10 caps\" heroin daily and he has been snorting heroin since he was [age]years old. Patient verbalized that he smokes crack and started smoking crack in his late 19's. Patient is seeking help with his substance abuse which leads to thoughts of self harm. Patient denied thoughts of harm towards others, denied hallucinations. \"I got to get some help. ..if I leave. Franky Shillings Franky Shillings I don't know what will happen. \"    Mental Status Exam    The patient's appearance is unkempt. The patient's behavior calm, cooperative. The patient is oriented to time, place, person and situation. The patient's speech shows no evidence of impairment. The patient's mood is depressed and is sad. The range of affect is flat. The patient's thought content demonstrates no evidence of impairment. The thought process shows no evidence of impairment. The patient's perception shows no evidence of impairment. The patient's memory shows no evidence of impairment. The patient's appetite shows no evidence of impairment. The patient's sleep has evidence of insomnia, \"sleep 20 minutes at a time. ..tossing and turning all night. \"     Inpatient: None  Outpatient: \"Methadone clinic, 6-7 months, ago, and got kicked out because I had to to out of town with my job, and after 3 days, they kick you out. \"  Legal Issues: Denied  Access to weapons: Denied  Contact/Support Person: Harsh Newell, 'wife' [phone]\"    Substance Abuse    The patient is using substances. The patient is using cocaine by inhalation for greater than 10 years with last use on 12/30/19 and heroin by inhalation for greater than 10 years with last use on 12/30/19. Patient is receptive to voluntary admission at [facility]; discussed with on-call psychiatrist, admission orders received, and report called to charge nurse.     Monico Bergeron RN, BSN

## 2020-01-03 NOTE — BH NOTES
Pt out  For meals and to speak with is doctor stating to staff that he has had   a hard time sleeping but did mention it to his doctor. Pt informed to let staff know   if after getting medication tonight still not sleeping. Pt encouraged to try being out    of bed some with an attempt to assist with sleeping not so much during day. Reports   being alright with staying through the weekend. Returned to room to continue resting.

## 2020-01-03 NOTE — GROUP NOTE
CLARI  GROUP DOCUMENTATION INDIVIDUAL                                                                          Group Therapy Note    Date: 1/3/2020    Group Start Time: 5121  Group End Time: 1400  Group Topic: Nursing    [facility] 1 ADULT CHEM 6[address] Americo Moreno, RN    IP [address] [facility] GROUP DOCUMENTATION GROUP    Group Therapy Note    Attendees: 5         Attendance: Did not attend    Alisa Bowling RN

## 2020-01-03 NOTE — BSMART NOTE
ART THERAPY GROUP PROGRESS NOTE     Group time: 13:50     The patient did not awaken/get up when called for group.

## 2020-01-03 NOTE — ED NOTES
Assumed care of patient at 1800 pending crisis evaluation. Per Juan R Angeles with crisis patient to be admitted to inpatient mental health unit.       Violet Miner ENP-C, FNP-C

## 2020-01-03 NOTE — BSMART NOTE
SOCIAL WORK GROUP THERAPY PROGRESS NOTE    Group Time:  11am    Group Topic:  Coping Skills    Group Participation:   Pt reportedly did not attend group due to medical issues as he's wanting to stop using drugs & alcohol.

## 2020-01-03 NOTE — ED NOTES
TRANSFER - OUT REPORT:    Verbal report given to Mala Ventura on Aimee High  being transferred to Adult unit for routine progression of care       Report consisted of patients Situation, Background, Assessment and   Recommendations(SBAR). Information from the following report(s) SBAR, ED Summary, [facility] and Recent Results was reviewed with the receiving nurse. Lines:       Opportunity for questions and clarification was provided.       Patient transported with:   Registered Nurse   Security

## 2020-01-03 NOTE — BH NOTES
Voluntary pt transferred from [facility] ED to ACDU by security via wheelchair. Pt did not have any belongings, pt stated that his wife took his belongings home. Pt presents dull and depressed. Pt currently denies si/hi and avh and does contract for safety while on the unit. Pt endorses longtime use of heroin (insufflation) and crack cocaine. Pt also endorsed drinking a \"few beers every now and then\" and smoking a pack of cigarettes daily. Treatment plan process was reviewed with patient and he was oriented to the unit. Pt verbalized understanding and signed all admission documentation.     RNS WILL INITIATE, REVIEW, IMPLEMENT, DEVELOP OR REVISE TREATMENT PLAN

## 2020-01-03 NOTE — BSMART NOTE
Patient Assessment/Intervention: Patient is a 61year old male who was admitted experiencing depression and suicidal ideation with a plan to overdose on pills.       SW met with patient to introduce herself as patient's attending SW. Patient reported feeling tired on today. As such, patient reported that he is trying to detox off of heroin. Per patient he has a 40+ year heroin history off and on with a one year of sobriety approximately 5 years ago. SW prompted patient to discuss his outside supports whereas patient reported having support from his girlfriend, whom he lives with. SW encouraged patient to continue to build positive supports within the community to assist him with his goal of being substance free. SW encouraged patient to develop adequate coping skills to address cravings and urges with regards to his substance use. Patient denied any current suicidal/homicidal ideations and/or delusions. Plan: SW to continue to monitor patient progress as needed. Additionally, SW to encourage relapse prevention.     Nirmal Mcclendon, ESTHER-E

## 2020-01-03 NOTE — BSMART NOTE
OCCUPATIONAL THERAPY PROGRESS NOTE  Group Time:  0881  Attendance: The patient attended full group. Participation:  The patient participated with minimal elaboration in the activity. Attention:  The patient was able to focus on the activity. Interaction:  The patient acknowledges others or responds to questions,  with no spontaneous interaction. Attended and participated as asked. Some slowness processing information, possibly detox related as he did not appear to feel well.

## 2020-01-04 LAB
CHOLEST SERPL-MCNC: 140 MG/DL
HBA1C MFR BLD: 5.7 % (ref 4.2–5.6)
HDLC SERPL-MCNC: 52 MG/DL (ref 40–60)
HDLC SERPL: 2.7 {RATIO} (ref 0–5)
LDLC SERPL CALC-MCNC: 60.2 MG/DL (ref 0–100)
LIPID PROFILE,FLP: NORMAL
TRIGL SERPL-MCNC: 139 MG/DL (ref ?–150)
TSH SERPL DL<=0.05 MIU/L-ACNC: 0.91 UIU/ML (ref 0.36–3.74)
VLDLC SERPL CALC-MCNC: 27.8 MG/DL

## 2020-01-04 PROCEDURE — 80061 LIPID PANEL: CPT

## 2020-01-04 PROCEDURE — 36415 COLL VENOUS BLD VENIPUNCTURE: CPT

## 2020-01-04 PROCEDURE — 74011250637 HC RX REV CODE- 250/637: Performed by: PSYCHIATRY & NEUROLOGY

## 2020-01-04 PROCEDURE — 84443 ASSAY THYROID STIM HORMONE: CPT

## 2020-01-04 PROCEDURE — 65220000005 HC RM SEMIPRIVATE PSYCH 3 OR 4 BED

## 2020-01-04 PROCEDURE — 74011000250 HC RX REV CODE- 250: Performed by: PSYCHIATRY & NEUROLOGY

## 2020-01-04 PROCEDURE — 83036 HEMOGLOBIN GLYCOSYLATED A1C: CPT

## 2020-01-04 RX ORDER — LIDOCAINE 4 G/100G
1 PATCH TOPICAL EVERY 24 HOURS
Status: DISCONTINUED | OUTPATIENT
Start: 2020-01-04 | End: 2020-01-05

## 2020-01-04 RX ORDER — TRAZODONE HYDROCHLORIDE 100 MG/1
100 TABLET ORAL
Status: DISCONTINUED | OUTPATIENT
Start: 2020-01-04 | End: 2020-01-10 | Stop reason: HOSPADM

## 2020-01-04 RX ADMIN — LOPERAMIDE HYDROCHLORIDE 2 MG: 2 CAPSULE ORAL at 17:47

## 2020-01-04 RX ADMIN — ESCITALOPRAM OXALATE 10 MG: 10 TABLET ORAL at 08:19

## 2020-01-04 RX ADMIN — TRAZODONE HYDROCHLORIDE 100 MG: 100 TABLET ORAL at 21:19

## 2020-01-04 RX ADMIN — IBUPROFEN 800 MG: 400 TABLET ORAL at 06:27

## 2020-01-04 RX ADMIN — IBUPROFEN 800 MG: 400 TABLET ORAL at 17:12

## 2020-01-04 NOTE — BSMART NOTE
ART THERAPY GROUP PROGRESS NOTE    Group time: 10:25      The patient did not awaken/get up when called for group.

## 2020-01-04 NOTE — BH NOTES
Patient stayed in his room most of the time. Patient had a visitor which was his wife. Patient complained about his back hurting. Patient has a flat affect.

## 2020-01-04 NOTE — BH NOTES
ADRIANE Note: The above pt had a visit from his female friend which appeared to have went well this shift.

## 2020-01-04 NOTE — PROGRESS NOTES
The pt was seen in psych rounds today, case was discussed with staff. Main issue today was the presence of back pain, which is chronic. Otherwise, the intensity of his opioid related w/s is improving as expected. Still depressed, tolerance to escitalopram is rather adequate. The dose has been recently increased, and so it is too soon to modifiy it upwards again. 24-Hr Vitals/Weight (last day)     Date/Time Temp Pulse BP MAP (Calculated) BP 1 Location BP Patient Position Resp SpO2 O2 Device O2 Flow Rate (L/min) Level of Consciousness MEWS Score Weight   01/04/20 0818 98 °F (36.7 °C) 56Abnormal  105/74 84 Right arm Sitting 18    Alert 1    01/03/20 2045  74 114/78 90   18         01/03/20 1607 98.3 °F (36.8 °C) 51Abnormal  111/77 88 Right arm Sitting 18    Alert 1    01/03/20 0813 97.7 °F (36.5 °C) 62 119/82 94 Right arm Sitting 18    Alert 1      The presence of bradycardia is noticed. The pt denied any cardiac related symptoms. Will add tx with lidocaine patches as ordered. Will see again tomorrow.

## 2020-01-04 NOTE — PROGRESS NOTES
Problem: Suicide  Goal: *STG: Remains safe in hospital  Description  AEB pt not harming self or others and shahzad for safety daily while in the hospital.   Outcome: Progressing Towards Goal     Problem: Falls - Risk of  Goal: *Absence of Falls  Description  Document Iris Moore Fall Risk and appropriate interventions in the flowsheet. 11/4/2020 1118 by Bassam Hernandez, NARCISA  Note: Fall Risk Interventions:                Medication Interventions: Teach patient to arise slowly    Patient's girlfriend attended afternoon visitation. Patient has been compliant with scheduled medications. Patient complained of having difficulty sleeping last night and was encouraged to \"try and stay up for longer stretches instead of just eating. I know it's hard when your not feeling well, but... it doesn't help your nighttime sleep if you sleep most of the day. \"  Patient agreed. Patient has tolerated meals this shift and has been free from falls and harm this shift. Patient reminded of PRN medications for symptoms of withdrawal, \"especially if it makes being in the dayroom more tolerable. \" will continue to monitor and provide appropriate interventions as needs.

## 2020-01-05 PROCEDURE — 74011000250 HC RX REV CODE- 250: Performed by: PSYCHIATRY & NEUROLOGY

## 2020-01-05 PROCEDURE — 74011250637 HC RX REV CODE- 250/637: Performed by: PSYCHIATRY & NEUROLOGY

## 2020-01-05 PROCEDURE — 65220000005 HC RM SEMIPRIVATE PSYCH 3 OR 4 BED

## 2020-01-05 RX ORDER — LIDOCAINE 50 MG/G
OINTMENT TOPICAL
Status: DISCONTINUED | OUTPATIENT
Start: 2020-01-05 | End: 2020-01-10 | Stop reason: HOSPADM

## 2020-01-05 RX ADMIN — ESCITALOPRAM OXALATE 10 MG: 10 TABLET ORAL at 08:25

## 2020-01-05 RX ADMIN — TRAZODONE HYDROCHLORIDE 100 MG: 100 TABLET ORAL at 20:24

## 2020-01-05 RX ADMIN — IBUPROFEN 800 MG: 400 TABLET ORAL at 05:17

## 2020-01-05 RX ADMIN — IBUPROFEN 800 MG: 400 TABLET ORAL at 20:24

## 2020-01-05 RX ADMIN — HYDROXYZINE PAMOATE 50 MG: 50 CAPSULE ORAL at 05:18

## 2020-01-05 NOTE — BH NOTES
ADRIANE Note: The above pt had a visit from a female peer which appeared to have went well this shift.

## 2020-01-05 NOTE — PROGRESS NOTES
The pt was seen in psych rounds, his case was discussed with staff. During session today, he described a mild but initial improvement with the intensity of his w/s. However, the initially ordered prescription for lidocaine patches did not work. So a change to lidocaine ointment was ordered. Hopefully it will help a little better. 24-Hr Vitals/Weight (last day)     Date/Time Temp Pulse BP MAP (Calculated) BP 1 Location BP Patient Position Resp SpO2 O2 Device O2 Flow Rate (L/min) Level of Consciousness MEWS Score Weight   01/05/20 1609 97.9 °F (36.6 °C) 59Abnormal  109/72 84 Left arm Sitting 20    Alert 1    01/05/20 1140 98.6 °F (37 °C) 52Abnormal  127/82 97 Left arm At rest 18    Alert 1    01/05/20 0822 96.8 °F (36 °C) 67 124/86 99   18    Alert 1    01/05/20 0524 98 °F (36.7 °C) 50Abnormal  100/60 73   18    Alert 3    01/04/20 2019 98.6 °F (37 °C) 57Abnormal  108/73 85 Left arm At rest 18    Alert 1    01/04/20 1639 98.8 °F (37.1 °C) 65 111/78 89 Left arm At rest 16    Alert 1    01/04/20 0818 98 °F (36.7 °C) 56Abnormal  105/74 84 Right arm Sitting 18    Alert 1      Vitals are stable. Sinus brisa is asymptomatic. Will see again tomorrow.

## 2020-01-05 NOTE — BSMART NOTE
ART THERAPY GROUP PROGRESS NOTE    Group time: 10:25    The patient did not awaken/get up when called for group despite encouragement from staff.

## 2020-01-05 NOTE — BH NOTES
Pt was awake when this nurse attended to Pt roommate. Vital are BP-100/60 pulse 50 ( confirmed )  r-18. Pt alert oriented x3. No cardiac symptoms. Pt reports generalized pain and restlessness now. He was given motrin 800 mg. Po and vistaril 50 mg. Po for cow=5. Clonidine was not given due to low pulse. Supervisor notified. MD. will be alerted. * Clonidine prn order is to be held if BP is less than 90/60 and pulse less than 60. *

## 2020-01-05 NOTE — BH NOTES
Pt sleeping with cover of his head. Covers moved back. Pt without respiratory distress. Moans yes when named called. No apparent opiate withdrawal noted at this time. Pt would not extend arm for vitals.

## 2020-01-05 NOTE — GROUP NOTE
[facility] GROUP DOCUMENTATION INDIVIDUAL                                                                          Group Therapy Note    Date: 1/4/2020    Group Start Time: 1115  Group End Time: 1130  Group Topic: Medication    [address] ADULT CHEM DEP    Dominqiue House RN    IP [address] [facility] GROUP DOCUMENTATION GROUP    Group Therapy Note    Attendees: 7         Attendance: Attended    Interventions/techniques: Informed and Reinforced    Follows Directions: Followed directions    Interactions: Interacted appropriately    Mental Status: Calm    Behavior/appearance: Attentive and Cooperative    Goals Achieved:  Identified relapse prevention strategies, Identified medication adherence strategies and Identified resources and support systems    Rosio Live, RN

## 2020-01-05 NOTE — GROUP NOTE
IP  GROUP DOCUMENTATION INDIVIDUAL                                                                          Group Therapy Note    Date: 1/5/2020    Group Start Time: 9341  Group End Time: 1890  Group Topic: Nursing    [facility] 1 ADULT CHEM DEP    Nolan Barcenas, RN    IP [facility] GROUP DOCUMENTATION GROUP    Group Therapy Note    Attendees: 6       Attendance: Did not attend      IAdditional Notes:  Patient was only present for group while he was eating lunch. Patient opted to return to room when he finished eating. Mckayla Class  Kitty Reilly

## 2020-01-05 NOTE — PROGRESS NOTES
Problem: Opiate Withdrawal  Goal: *STG: Will identify negative impact of chemical dependency including the use of tobacco, alcohol, and other substances  Description  AEB will identify 3 negative impacts of chemical dependency prior to discharge from the hospital.   Outcome: Progressing Towards Goal     Problem: Suicide  Goal: *STG: Attends activities and groups  Description  AEB pt attending 3 groups daily while in the hospital.   Outcome: Not Progressing Towards Goal     Patient has declined groups this shift. Patient has opted to rest in room except for meals. Patient compliant with scheduled medications and accepted PRN medications when protocol met. Patient free from falls and harm. Will continue to monitor and provide interventions as needed.

## 2020-01-06 PROCEDURE — 65220000005 HC RM SEMIPRIVATE PSYCH 3 OR 4 BED

## 2020-01-06 PROCEDURE — 74011250637 HC RX REV CODE- 250/637: Performed by: PSYCHIATRY & NEUROLOGY

## 2020-01-06 PROCEDURE — 74011000250 HC RX REV CODE- 250

## 2020-01-06 PROCEDURE — 74011000250 HC RX REV CODE- 250: Performed by: PSYCHIATRY & NEUROLOGY

## 2020-01-06 RX ORDER — CYCLOBENZAPRINE HCL 5 MG
5 TABLET ORAL 2 TIMES DAILY
Status: DISCONTINUED | OUTPATIENT
Start: 2020-01-06 | End: 2020-01-07

## 2020-01-06 RX ADMIN — CYCLOBENZAPRINE HYDROCHLORIDE 5 MG: 5 TABLET, FILM COATED ORAL at 20:26

## 2020-01-06 RX ADMIN — ESCITALOPRAM OXALATE 10 MG: 10 TABLET ORAL at 08:09

## 2020-01-06 RX ADMIN — LIDOCAINE: 50 OINTMENT TOPICAL at 14:27

## 2020-01-06 RX ADMIN — IBUPROFEN 800 MG: 400 TABLET ORAL at 08:41

## 2020-01-06 RX ADMIN — LIDOCAINE: 50 OINTMENT TOPICAL at 08:09

## 2020-01-06 RX ADMIN — TRAZODONE HYDROCHLORIDE 100 MG: 100 TABLET ORAL at 20:26

## 2020-01-06 RX ADMIN — LIDOCAINE: 50 OINTMENT TOPICAL at 21:34

## 2020-01-06 NOTE — BSMART NOTE
DARRYN assessment/Intervention:  The patient is a 80-year-old [facility] [place] male admitted after he presented himself to [facility] Emergency Department with a history of opioid use disorder and depression, the patient was described at the time of the evaluation by the ER staff as being not only depressed but also suicidal.    Patient is observed engaged in the milieu with peers. Patient is social and has an extensive history of substance abuse and reports his drug of choice to be heroin and cocaine. Patient reports he is interested in Outpatient therapy. He reports he is not interested in long term treatment. Patient reports he resides with his girlfriend and will seek options presented by this writer. SW encouraged patient to engage in group therapy to address moods cognitions and related behaviors. Plan:  SW will continue wit supportive therapy and discharge planning.     Rafa Chavez LCSW-E

## 2020-01-06 NOTE — PROGRESS NOTES
The pt was seen individually and his case was discussed with staff. During session, he described his depression being still intense, however his opioid w/s are improving. He did describe having continuous problems with his back pain. Will add tx with Flexeril 5 mg BID. The dose may have to be increased. 24-Hr Vitals/Weight (last day)     Date/Time Temp Pulse BP MAP (Calculated) BP 1 Location BP Patient Position Resp SpO2 O2 Device O2 Flow Rate (L/min) Level of Consciousness MEWS Score Weight   01/06/20 0841 97.9 °F (36.6 °C) 63 114/81 92 Right arm Sitting 18    Alert 1    01/05/20 1946 98.3 °F (36.8 °C) 63 105/74 84 Left arm Sitting 20    Alert 1    01/05/20 1609 97.9 °F (36.6 °C) 59Abnormal  109/72 84 Left arm Sitting 20    Alert 1    01/05/20 1140 98.6 °F (37 °C) 52Abnormal  127/82 97 Left arm At rest 18    Alert 1    01/05/20 0822 96.8 °F (36 °C) 67 124/86 99   18    Alert 1    01/05/20 0524 98 °F (36.7 °C) 50Abnormal  100/60 73   18    Alert 3      Vitals are stable. Discussion followed regarding his being referred to rehab. Not clear if he has to go back to work, due to his having a great deal of economical stressors. Hopefully he will agree to the referral. Will see again tomorrow.

## 2020-01-06 NOTE — BH NOTES
Patient visible on milieu. Noted pacing in hallway swinging his arms back and forth. When greeted and asked why he was swing his arms in that particular manner, patient stated he has back issues and is trying to relax. Patient encouraged to perform \"gentle\" exercises to avoid straining and agreed to do so. Denies SI/HI/AH. Contracts for safety on unit. No behavorial issues noted. Attended group activites and participated. Had a visitor this period who brought in personal items/snacks. Will continue to monitor.

## 2020-01-06 NOTE — GROUP NOTE
[facility] GROUP DOCUMENTATION INDIVIDUAL                                                                          Group Therapy Note    Date: 1/6/2020    Group Start Time: 0900  Group End Time: 0930  Group Topic: Nursing    [facility] 1 ADULT CHEM DEP    Chaparro Newman RN    Attendees: 5     Attendance: Attended    Interventions/techniques: Promoted peer support and Supported    Follows Directions: Followed directions    Interactions: Interacted appropriately    Mental Status: Calm    Behavior/appearance: Attentive and Cooperative    Goals Achieved:  Identified relapse prevention strategies, Identified medication adherence strategies and Identified resources and support systems    Genevieve Yuan RN

## 2020-01-06 NOTE — BSMART NOTE
ART THERAPY GROUP PROGRESS NOTE    Group time:1320    The patient refused group despite encouragement

## 2020-01-06 NOTE — BSMART NOTE
SOCIAL WORK GROUP THERAPY PROGRESS NOTE    Group Time:  11am    Group Topic:  Coping Skills    C D Issues    Pt moderately involved during group discussion but remained attentive. Members discussed handout on the role of \"neurotransmitters\" and how they regulate different aspects of one's moods, cognitions & related behavior. Better insight into impact of his negative self statements. Proud he's asked for long term tx. Discussion included the process of making \"Change\" by answering questions on handout with an emphasis on strengths & weaknesses to support improving one's self esteem.

## 2020-01-06 NOTE — BH NOTES
Patient did not have visitors this shift. Patient compliant with scheduled medications and received PRN Trazodone and Motrin at bedtime. Patient did not return directly to bed after dinner and instead opted to watch football game with several peers until bedtime medications passed. Patient free from falls and harm. Patient interacted appropriately with staff and peers.

## 2020-01-06 NOTE — BSMART NOTE
OCCUPATIONAL THERAPY PROGRESS NOTE  Group Time:  8900  Attendance: The patient attended full group. Participation:  The patient participated with moderate elaboration in the activity. Attention:  The patient was able to focus on the activity. Interaction:  The patient acknowledges others or responds to questions,  with no spontaneous interaction.

## 2020-01-06 NOTE — GROUP NOTE
CLARI  GROUP DOCUMENTATION INDIVIDUAL                                                                          Group Therapy Note    Date: 1/5/2020    Group Start Time: 7615  Group End Time: 1374  Group Topic: Medication    [facility] 1 ADULT CHEM DEP    Birdie Salazar, RN    IP 1150 [facility] GROUP DOCUMENTATION GROUP    Group Therapy Note    Attendees: 5         Attendance: Did not attend    Additional Notes:  Patient chose to remain in the room during group time    Laverne Pugh RN

## 2020-01-06 NOTE — PROGRESS NOTES
Problem: Opiate Withdrawal  Goal: *STG: Verbalizes abstinence as an achievable goal  Description  AEB pt verbalizing abstinence as an achievable goal prior to discharge from the hospital.   Outcome: Progressing Towards Goal  Note:   Pt states that he is wanting to stop. Problem: Suicide  Goal: *STG: Remains safe in hospital  Description  AEB pt not harming self or others and shahzad for safety daily while in the hospital.   Outcome: Progressing Towards Goal  Note:   Pt remains safe. Goal: *STG/LTG: Complies with medication therapy  Description  AEB pt taking all scheduled medications daily while in the hospital   Outcome: Progressing Towards Goal  Note:   Pt takes medications as ordered. Patient has been in his room for most if the day and today is calm and cooperative. Patient denies active SI while hospitalized and is mainly concerned with his back pain today. Patient appears depressed and does endorse depression.

## 2020-01-07 PROCEDURE — 65220000005 HC RM SEMIPRIVATE PSYCH 3 OR 4 BED

## 2020-01-07 PROCEDURE — 74011250637 HC RX REV CODE- 250/637: Performed by: PSYCHIATRY & NEUROLOGY

## 2020-01-07 RX ORDER — CYCLOBENZAPRINE HCL 5 MG
10 TABLET ORAL 2 TIMES DAILY
Status: DISCONTINUED | OUTPATIENT
Start: 2020-01-07 | End: 2020-01-10 | Stop reason: HOSPADM

## 2020-01-07 RX ADMIN — ESCITALOPRAM OXALATE 10 MG: 10 TABLET ORAL at 08:40

## 2020-01-07 RX ADMIN — TRAZODONE HYDROCHLORIDE 100 MG: 100 TABLET ORAL at 20:57

## 2020-01-07 RX ADMIN — LIDOCAINE: 50 OINTMENT TOPICAL at 08:40

## 2020-01-07 RX ADMIN — CYCLOBENZAPRINE HYDROCHLORIDE 10 MG: 5 TABLET, FILM COATED ORAL at 20:57

## 2020-01-07 RX ADMIN — CYCLOBENZAPRINE HYDROCHLORIDE 5 MG: 5 TABLET, FILM COATED ORAL at 08:41

## 2020-01-07 NOTE — BSMART NOTE
COUNSELING GROUP PROGRESS NOTE    PATIENT SCHEDULED FOR GROUP AT: 14:15    ATTENDANCE: Full    PARTICIPATION LEVEL: Participates fully in the group process. ATTENTION LEVEL: Able to focus on task. FOCUS: Stress    THEMATIC CONTENT AS NOTED IN REFLECTION: He presented with a euthymic mood and calm affect and his thought processes were logical. He was actively engaged in the group therapy directive and his approach to task was intentional. He was actively engaged in group discussions and participated in sharing his work with the group. Thematic content was appropriate for the given directive and focused on his history of using is a stressor in his life and that he wants to work to stay clean as it helps him feel healthier.

## 2020-01-07 NOTE — GROUP NOTE
CLARI  GROUP DOCUMENTATION INDIVIDUAL                                                                          Group Therapy Note    Date: 1/7/2020    Group Start Time: 0930  Group End Time: 1000  Group Topic: Nursing    [address] [facility] 1 ADULT CHEM [address], RN    IP 1150 [facility] GROUP DOCUMENTATION GROUP    Group Therapy Note    Attendees: 5         Attendance: Did not attend    Ila Abdalla RN

## 2020-01-07 NOTE — PROGRESS NOTES
Problem: Opiate Withdrawal  Goal: *STG: Will identify negative impact of chemical dependency including the use of tobacco, alcohol, and other substances  Description  AEB will identify 3 negative impacts of chemical dependency prior to discharge from the hospital.   Outcome: Progressing Towards Goal  Note:   Pt has no evidence of withdrawal.      Problem: Suicide  Goal: *STG: Remains safe in hospital  Description  AEB pt not harming self or others and shahzad for safety daily while in the hospital.   Outcome: Progressing Towards Goal  Note:   Pt remains safe. Goal: *STG/LTG: Complies with medication therapy  Description  AEB pt taking all scheduled medications daily while in the hospital   Outcome: Progressing Towards Goal  Note:   Pt takes medications as ordered. Patient has been in the milieu for part of the day and otherwise has been resting in bed. Patient denies active SI/HI and AVH and is participating in groups on the unit as well as interacting when approached by staff and peers. Patient is compliant with medications and has expressed no new concerns on the unit.

## 2020-01-07 NOTE — BSMART NOTE
ART THERAPY GROUP PROGRESS NOTE    PATIENT SCHEDULED FOR GROUP AT: 1992    ATTENDANCE: Full    PARTICIPATION LEVEL: Participates fully in the art process    ATTENTION LEVEL : Able to focus on task    FOCUS: Goals    SYMBOLIC & THEMATIC CONTENT AS NOTED IN IMAGERY: He was calm, compliant, and invested in the task at hand. He kept to himself unless directly prompted. He claimed that his main goal was to \"get clean and stay clean. \" He claimed that he plans on going to a rehabilitation program and hopes to \"meet new people and hang around people that don't use. \"

## 2020-01-07 NOTE — BSMART NOTE
DARRYN assessment/Intervention:  The patient is a 55-year-old [facility] American male admitted after he presented himself to [facility] Emergency Department with a history of opioid use disorder and depression, the patient was described at the time of the evaluation by the ER staff as being not only depressed but also suicidal.    Jazmyn Sheldon made contact with patient as he engaged in the milieu. Patient reports he is feeling better however, is prepared to complete a long term rehab. SW submitted application to [employer] as well as [employer]. Plan:  SW will continue to assist with discharge and will seek possible placement for long term care.       Rosan Apley, ESTHER-E

## 2020-01-07 NOTE — PROGRESS NOTES
The pt was seen individually and his case was discussed with staff. Participating more appropriately, still depressed and somatic, the pt is agreeable to be referred to a rehab program. His IP  has submitted applications to the [facility], and to [facility]. 24-Hr Vitals/Weight (last day)     Date/Time Temp Pulse BP MAP (Calculated) BP 1 Location BP Patient Position Resp SpO2 O2 Device O2 Flow Rate (L/min) Level of Consciousness MEWS Score Weight   01/07/20 0859 97.9 °F (36.6 °C) 62 110/74 86 Right arm Sitting 16    Alert 1    01/06/20 2027 98.8 °F (37.1 °C) 67 120/80 93 Left arm Sitting 20    Alert 1    01/06/20 1622 98 °F (36.7 °C) 61 116/79 91   18         01/06/20 0841 97.9 °F (36.6 °C) 63 114/81 92 Right arm Sitting 18    Alert 1      Vitals are stable. Treatment with added cyclobenzaprine is being well tolerated, and has provided some relief. Will increase the dose to 10 mg BID. Will discuss with the Tx Team tomorrow. Will see him again in the morning.

## 2020-01-07 NOTE — BSMART NOTE
OCCUPATIONAL THERAPY PROGRESS NOTE  Group Time:  2179  Attendance: The patient attended full group. Participation:  The patient participated with moderate elaboration in the activity. Attention:  The patient was able to focus on the activity. Interaction:  The patient acknowledges others or responds to questions,  with no spontaneous interaction. Discussed possibility of longer term treatment. Able to ID positive self talk to use.

## 2020-01-07 NOTE — BSMART NOTE
SOCIAL WORK GROUP THERAPY PROGRESS NOTE    Group Time:  11am    Group Topic:  Coping Skills    C D Issues    Group Participation:      Pt moderately involved during group discussion but remained attentive. Affect still flat. No self disclosure. Emphasis of session was presentation of basic \"CBT\" principles including diagram of the process, as well as list of typical cognitive distortions. Part of session was on the variety of \"normal\" physical & emotional recovery symptoms that can possibly be experienced for several months and even up to (2) yrs.

## 2020-01-08 PROCEDURE — 74011250637 HC RX REV CODE- 250/637: Performed by: PSYCHIATRY & NEUROLOGY

## 2020-01-08 PROCEDURE — 65220000005 HC RM SEMIPRIVATE PSYCH 3 OR 4 BED

## 2020-01-08 RX ADMIN — ESCITALOPRAM OXALATE 10 MG: 10 TABLET ORAL at 08:22

## 2020-01-08 RX ADMIN — TRAZODONE HYDROCHLORIDE 100 MG: 100 TABLET ORAL at 20:42

## 2020-01-08 RX ADMIN — CYCLOBENZAPRINE HYDROCHLORIDE 10 MG: 5 TABLET, FILM COATED ORAL at 20:42

## 2020-01-08 RX ADMIN — CYCLOBENZAPRINE HYDROCHLORIDE 10 MG: 5 TABLET, FILM COATED ORAL at 08:22

## 2020-01-08 NOTE — BSMART NOTE
COUNSELING GROUP PROGRESS NOTE    Group time: 12:35    The patient did not awaken/get up when called for group, they remained in their room until end of group then came to day area as group was finishing up.

## 2020-01-08 NOTE — PROGRESS NOTES
Problem: Crack/Cocaine Withdrawal  Goal: *STG: Will identify negative impact of chemical dependency including the use of tobacco, alcohol, and other substances  Description  AEB pt identifying 3 negative impacts of chemical dependency prior to discharge from the hospital.   Outcome: Progressing Towards Goal     Problem: Crack/Cocaine Withdrawal  Goal: *STG: Able to indentify relapse triggers including interpersonal/social and familial factors  Description  AEB pt identifying 3 relapse triggers prior to discharge from the hospital.   Outcome: Progressing Towards Goal     Patient has been in day area much more this shift than over the weekend when this nurse was on unit. Patient affect brighter and this nurse commented \"it's nice to see you smile. \"  patient continues to voice willingness to attend long-term program if \"it's close. \"  Patient compliant with unit rules and guidelines. Will continue to monitor and provide appropriate interventions as needed.

## 2020-01-08 NOTE — GROUP NOTE
IP  GROUP DOCUMENTATION INDIVIDUAL                                                                          Group Therapy Note    Date: 1/8/2020    Group Start Time: 0900  Group End Time: 0930  Group Topic: Medication    [facility] 1 ADULT CHEM DEP    Aleida Kapadia RN    IP [facility] GROUP DOCUMENTATION GROUP    Group Therapy Note    Attendees: 5         Attendance: Did not attend      Additional Notes:  Patient doing hygiene needs at time of group.  Will continue to promote group activities  Cody Singer RN

## 2020-01-08 NOTE — BSMART NOTE
OCCUPATIONAL THERAPY PROGRESS NOTE    Group Time:  6578  Attendance: The patient attended full group. .  Participation:  The patient participated with moderate elaboration in the activity. Attention:  The patient was able to focus on the activity. Interaction:  The patient acknowledges others or responds to questions,  with no spontaneous interaction.

## 2020-01-08 NOTE — GROUP NOTE
IP  GROUP DOCUMENTATION INDIVIDUAL                                                                          Group Therapy Note    Date: 1/7/2020    Group Start Time: 1930  Group End Time: 2015  Group Topic: Nursing    [facility] Renny Mcgill, NARCISA    IP [facility] GROUP DOCUMENTATION GROUP    Group Therapy Note    Attendees: 6         Attendance: Attended    Interventions/techniques: Informed    Follows Directions:  Followed directions    Interactions: Interacted appropriately    Mental Status: Calm    Behavior/appearance: Cooperative    Goals Achieved: Able to engage in interactions, Able to listen to others, Able to self-disclose and Discussed coping      Sandoval Cunha RN

## 2020-01-08 NOTE — BSMART NOTE
SW assessment/Intervention:  The patient is a 61year-old  male admitted after he presented himself to [facility] Emergency Department with a history of opioid use disorder and depression, the patient was described at the time of the evaluation by the ER staff as being not only depressed but also suicidal.    Patient is unable to attend [facility] due to insurance. SW is awaiting answer from [facility]. Patient reports he is tired of his constant drug use and would like to attempt long term treatment. SW will assist with discharge and possible placement with [facility]. SW will continue to monitor patient as needed. ANI Dorsey       Patient has been declined at [facility] due to insurance. SW will seek admission in partial hospitalization with [facility].

## 2020-01-08 NOTE — BSMART NOTE
ART THERAPY GROUP PROGRESS NOTE    Group time:0552    The patient declined group, claimed he \"did not feel well\" today.

## 2020-01-08 NOTE — PROGRESS NOTES
The pt was seen individually and his case was discussed with the Tx Team this morning. Informed by his IP  about the pt decision to be referred to the [facility] program, we were advised about his insurance not paying for his tx there. So his transfer was denied. 24-Hr Vitals/Weight (last day)     Date/Time Temp Pulse BP MAP (Calculated) BP 1 Location BP Patient Position Resp SpO2 O2 Device O2 Flow Rate (L/min) Level of Consciousness MEWS Score Weight   01/08/20 1214 98 °F (36.7 °C) 66 130/67 88   16    Alert 1    01/08/20 0809 97.6 °F (36.4 °C) 63 105/70 82 Right arm Sitting 18    Alert 1    01/08/20 0411  82 120/70 87 Right arm Sitting 18    Alert     01/07/20 2358 96.2 °F (35.7 °C) 72 116/66 83 Right arm Supine 18    Alert 1    01/07/20 0859 97.9 °F (36.6 °C) 62 110/74 86 Right arm Sitting 16    Alert 1      Vitals are stable. Steps are being taken to refer the pt to the [facility] program with [facility]. Will discuss the referral with his  tomorrow. Will see the pt then also.

## 2020-01-09 PROCEDURE — 74011250637 HC RX REV CODE- 250/637: Performed by: PSYCHIATRY & NEUROLOGY

## 2020-01-09 PROCEDURE — 65220000005 HC RM SEMIPRIVATE PSYCH 3 OR 4 BED

## 2020-01-09 RX ADMIN — CYCLOBENZAPRINE HYDROCHLORIDE 10 MG: 5 TABLET, FILM COATED ORAL at 20:30

## 2020-01-09 RX ADMIN — CYCLOBENZAPRINE HYDROCHLORIDE 10 MG: 5 TABLET, FILM COATED ORAL at 08:31

## 2020-01-09 RX ADMIN — ESCITALOPRAM OXALATE 10 MG: 10 TABLET ORAL at 08:31

## 2020-01-09 RX ADMIN — TRAZODONE HYDROCHLORIDE 100 MG: 100 TABLET ORAL at 20:30

## 2020-01-09 NOTE — BSMART NOTE
ART THERAPY GROUP PROGRESS NOTE    PATIENT SCHEDULED FOR GROUP AT: 1330    ATTENDANCE: Full    PARTICIPATION LEVEL: Participates fully in the art process    ATTENTION LEVEL: Able to focus on task    FOCUS: Goals    SYMBOLIC & THEMATIC CONTENT AS NOTED IN IMAGERY: He was calm, compliant, and invested in the task at hand. He kept to himself unless directly prompted and his responses were general. He spoke about \"toxic friends\" and the desire to cut ties with such friends and work on sobriety. He claimed he is interested in NA and other forms of treatment.

## 2020-01-09 NOTE — BH NOTES
Patient had a visitor this shift. Patient interacted with other patient . Patient appears to be calm with no issues.

## 2020-01-09 NOTE — PROGRESS NOTES
The pt was seen individually and his case was discussed with staff. During session, we informed the pt about his insurance denying an IP rehab admission, however approving his attending an intensive [place] [id number] program. IP  contacted [facility], with the pt being required to make the contact himself. 24-Hr Vitals/Weight (last day)     Date/Time Temp Pulse BP MAP (Calculated) BP 1 Location BP Patient Position Resp SpO2 O2 Device O2 Flow Rate (L/min) Level of Consciousness MEWS Score Weight   01/09/20 0749 97.3 °F (36.3 °C) 58Abnormal  114/76 89   16    Alert 1    01/08/20 1214 98 °F (36.7 °C) 66 130/67 88   16    Alert 1    01/08/20 0809 97.6 °F (36.4 °C) 63 105/70 82 Right arm Sitting 18    Alert 1    01/08/20 0411  82 120/70 87 Right arm Sitting 18    Alert       Vitals are stable. The pt will be possibly discharged tomorrow. Will see in the morning. Depression is better, however his overall prognosis will be rather poor if he does not attend the PHP.

## 2020-01-09 NOTE — GROUP NOTE
CLARI  GROUP DOCUMENTATION INDIVIDUAL                                                                          Group Therapy Note    Date: 1/8/2020    Group Start Time: 1945  Group End Time: 2000  Group Topic: Nursing    [facility] 1 ADULT CHEM DEP    Shanika Hooker, RN    [facility] GROUP DOCUMENTATION GROUP    Group Therapy Note    Attendees:          Attendance: Attended        Interventions/techniques: Informed    Follows Directions: Followed directions    Interactions: Interacted appropriately    Mental Status: Calm    Behavior/appearance: Attentive    Goals Achieved: Able to engage in interactions, Able to listen to others and Able to give feedback to another      Regina Read.  Freda Avery

## 2020-01-09 NOTE — BSMART NOTE
DARRYN assessment/Intervention:  The patient is a 61year-old  male admitted after he presented himself to [facility] Emergency Department with a history of opioid use disorder and depression, the patient was described at the time of the evaluation by the ER staff as being not only depressed but also suicidal.    DARRYN informed patient of denial to requested treatment facilities. SW provided patient with alternative option which is partial hospitalization programs. Collateral Contact:   DARRYN contacted [facility] who reported patient must contact facility to complete assessment and attend program daily from 9-3:30pm.  SW informed patient who reports he will think about the program.  Demetrice Hawley will continue to assist and will complete discharge as needed.     MODESTA HammE

## 2020-01-09 NOTE — PROGRESS NOTES
NUTRITION    Nutrition Screen     RECOMMENDATIONS / PLAN:     - No nutrition intervention indicated at this time. Will re-screen as appropriate. NUTRITION DIAGNOSIS & INTERVENTIONS:     - Meals/snacks: general healthy diet  - Collaboration and referral of nutrition care: Discussed meal intake with nursing    Nutrition Diagnosis: No nutrition diagnosis at this time. ASSESSMENT:     Admitted experiencing depression and SI with plan. Pt in group therapy during visit. Per nursing pt with good meal intake and appetite, tolerating diet. Nutritional intake adequate to meet patients estimated nutritional needs:  Yes    Diet: DIET REGULAR    Food Allergies: NKFA  Current Appetite: Good  Appetite/meal intake prior to admission: Unknown  Feeding Limitations:  [] Swallowing Difficulty       [] Chewing Difficulty       [] Other   Current Meal Intake: No data found. Gastrointestinal Issues:  [] Yes    [] No   Skin Integrity:  WDL    Pertinent Medications:  Reviewed   Labs:  Reviewed     Anthropometrics:  Ht Readings from Last 1 Encounters:   01/02/20 5' 9\" (1.753 m)       Last 3 Recorded Weights in this Encounter    01/02/20 1249   Weight: 79.4 kg (175 lb)       Body mass index is 25.84 kg/m².       Weight History:   Weight Metrics 1/2/2020 5/6/2019 4/30/2019 12/28/2018 12/18/2018 12/15/2018 9/5/2018   Weight 175 lb 182 lb 184 lb 168 lb 172 lb 167 lb 170 lb   BMI 25.84 kg/m2 26.88 kg/m2 27.17 kg/m2 24.81 kg/m2 25.4 kg/m2 24.66 kg/m2 25.1 kg/m2       Admitting Diagnosis: Depression [F32.9]  Heroin addiction ([facility] [address] [F11.20]  Past Medical History:   Diagnosis Date    Acid indigestion     Anemia NEC     Back pain     Cardiomegaly     1/12 LVH on EKG    Chest pain, unspecified     Drug use     Heroin causing adverse effect in therapeutic use     Irregular heart beat     Nonspecific abnormal electrocardiogram (ECG) (EKG)     S Ronak, RBBB    Pulmonary nodule     Tachycardia     Tightness in chest     Tiredness  Tobacco use disorder         Education Needs:        [x] None identified  [] Identified - Not appropriate at this time  []  Identified and addressed - refer to education log  Learning Limitations:   [x] None identified  [] Identified    Cultural, Hinduism & ethnic food preferences identified:  [x] None    [] Yes      ESTIMATED NUTRITION NEEDS:     0271-2549 kcal (MSJx1.2-1.3), 63-79 gm protein (0.8-1 gm/kg), 1 mL/kcal  Based on: 79 kg       [x] Actual BW      [] IBW          MONITORING & EVALUATION:     Nutrition Goal(s):   - PO nutrition intake will continue to meet >75% of patients estimated nutritional needs over the next 7 days. Outcome: New/Initial goal     Monitor: [x] Food and nutrient intake   [x] Food and nutrient administration  [x] Comparative standards   [x] Nutrition-focused physical findings   [x] Anthropometric Measurements   [x] Treatment/therapy   [x] Biochemical data, medical tests, and procedures         Previous Recommendations (for follow-up assessments only):    Not Applicable      Discharge Planning: No nutritional discharge needs at this time.     [x]  Participated in care planning, discharge planning, & interdisciplinary rounds as appropriate      Rocio Prince RD   Pager: [phone]

## 2020-01-09 NOTE — BH NOTES
Pt has been compliant with attending groups. Was seen by  and informed insurance would  not cover his going to treatment center. He states possible discharge   tomorrow and will find treatment locally. Reports being disappointed but okay with it   stating does want treatment. Continue with treatment while here.

## 2020-01-09 NOTE — BSMART NOTE
COUNSELING GROUP PROGRESS NOTE    PATIENT SCHEDULED FOR GROUP AT: 10:15    ATTENDANCE: 3/4    PARTICIPATION LEVEL: Participates fully in the group process. ATTENTION LEVEL: Able to focus on task. FOCUS: Stress    THEMATIC CONTENT AS NOTED IN REFLECTION: He presented with a calm mood and congruent affect and his thought processes were brief and logical. He was engaged in the group therapy directive and his approach to task was purposeful. He left group briefly and returned and resumed work on the directive. He kept to himself for much of group and did not participate in sharing his work with the group.

## 2020-01-09 NOTE — GROUP NOTE
IP  GROUP DOCUMENTATION INDIVIDUAL                                                                          Group Therapy Note    Date: 1/9/2020    Group Start Time: 1100  Group End Time: 1130  Group Topic: Medication    [facility] 1 ADULT CHEM DEP    Song Mathis RN    IP 1150 [facility] GROUP DOCUMENTATION GROUP    Group Therapy Note    Attendees: 4           Attendance: Attended      Interventions/techniques: Informed, Provide feedback and Reinforced    Follows Directions: Followed directions    Interactions: Interacted appropriately    Mental Status: Calm    Behavior/appearance: Cooperative    Goals Achieved:  Identified relapse prevention strategies, Identified medication adherence strategies and Identified resources and support systems    Sacha Delatorre RN

## 2020-01-09 NOTE — BSMART NOTE
OCCUPATIONAL THERAPY PROGRESS NOTE  Group Time:  3162  Attendance: The patient attended full group. Participation:  The patient participated with minimal elaboration in the activity. Attention:  The patient needed redirection to activity at least once. Interaction:  The patient acknowledges others or responds to questions,  with no spontaneous interaction. Participated when called on in discussion on stress management. Appeared preoccupied.

## 2020-01-09 NOTE — PROGRESS NOTES
Problem: Opiate Withdrawal  Goal: *STG: Will identify negative impact of chemical dependency including the use of tobacco, alcohol, and other substances  Description  AEB will identify 3 negative impacts of chemical dependency prior to discharge from the hospital.   Outcome: Progressing Towards Goal  Note:   Pt verbalizes wanting rehab and abstinence. Problem: Suicide  Goal: *STG: Remains safe in hospital  Description  AEB pt not harming self or others and shahzad for safety daily while in the hospital.   Outcome: Progressing Towards Goal  Note:   Pt remains safe. Goal: *STG/LTG: Complies with medication therapy  Description  AEB pt taking all scheduled medications daily while in the hospital   Outcome: Progressing Towards Goal  Note:   Pt takes medications as ordered. Patient has been in and out of the milieu today and has realized that he cannot go to rehab because of his insurance and is somewhat upset but understanding. Patient denies any SI/HI and AVH and is awaiting discharge. Patient states that he is going to do his best to stay clean this time.

## 2020-01-10 VITALS
RESPIRATION RATE: 16 BRPM | DIASTOLIC BLOOD PRESSURE: 78 MMHG | TEMPERATURE: 97 F | SYSTOLIC BLOOD PRESSURE: 112 MMHG | OXYGEN SATURATION: 100 % | HEART RATE: 77 BPM | BODY MASS INDEX: 25.92 KG/M2 | HEIGHT: 69 IN | WEIGHT: 175 LBS

## 2020-01-10 PROCEDURE — 74011250637 HC RX REV CODE- 250/637: Performed by: PSYCHIATRY & NEUROLOGY

## 2020-01-10 RX ORDER — CYCLOBENZAPRINE HCL 10 MG
10 TABLET ORAL 2 TIMES DAILY
Qty: 30 TAB | Refills: 1 | Status: SHIPPED | OUTPATIENT
Start: 2020-01-10 | End: 2020-09-05

## 2020-01-10 RX ORDER — ESCITALOPRAM OXALATE 10 MG/1
10 TABLET ORAL DAILY
Qty: 15 TAB | Refills: 1 | Status: SHIPPED | OUTPATIENT
Start: 2020-01-11

## 2020-01-10 RX ADMIN — ESCITALOPRAM OXALATE 10 MG: 10 TABLET ORAL at 08:42

## 2020-01-10 RX ADMIN — CYCLOBENZAPRINE HYDROCHLORIDE 10 MG: 5 TABLET, FILM COATED ORAL at 08:42

## 2020-01-10 NOTE — BH NOTES
Discharged patient to front entrance of hospital with all personal belongings and discharge instructions. Patient acknowledged all instructions with no further questions.

## 2020-01-10 NOTE — GROUP NOTE
CLARI  GROUP DOCUMENTATION INDIVIDUAL                                                                          Group Therapy Note    Date: 1/9/2020    Group Start Time: 2116  Group End Time: 2130  Group Topic: Nursing    [facility] 1 ADULT CHEM DEP    Raheem Blandon, RN    [facility] GROUP DOCUMENTATION GROUP    Group Therapy Note    Attendees: 8         Attendance: Did not attend. Seth Haynes.  Li Lopes

## 2020-01-10 NOTE — GROUP NOTE
IP  GROUP DOCUMENTATION INDIVIDUAL                                                                          Group Therapy Note    Date: 1/10/2020    Group Start Time: 1230  Group End Time: 1300  Group Topic: Nursing    [facility] ADULT CHEM DEP    Angel Nunes RN    IP [facility] GROUP DOCUMENTATION GROUP    Group Therapy Note    Attendees: 5         Attendance: Did not attend    Additional Notes:  Patient refused, will continue to provide and promote group activites    Palak Ramirez RN

## 2020-01-10 NOTE — DISCHARGE INSTRUCTIONS
***IMPORTANT NUMBERS***        [address]        [phone] [address] [facility]       [phone]    Suicide Prevention     [phone]          Patient is alert x3 and ambulatory. Patient has copy of discharge papers with follow up appt. Patient has prescriptions to be filled at pharmacy of choice. Patient has all personal belongings and has signed form. Patient denies thoughts of self harm or harm to others at this time. Patient armband taken and shredded. Patient discharged to home address with own transportation.

## 2020-01-11 NOTE — DISCHARGE SUMMARY
[address] DISCHARGE    Name:  Rosie Dave  MR#:   [id number]  :  1960  ACCOUNT #:  [de-identified]  ADMIT DATE:  2020  DISCHARGE DATE:  01/10/2020    SIGNIFICANT FINDINGS:  History and physical exam was performed shortly after the patient was admitted to the facility, attention being invited to this document, a place where the patient's reasons for which he consulted with [facility] Emergency Department, the same as the findings of his being examined there and so the reasons for which he required inpatient hospitalization are there clearly stated. For the purpose of this discharge summary, the reader is advised that when the patient presented himself to [facility], he described a history of severe opioid use in addition to his describing very specifically not only his being depressed, but also suicidal.  Upon evaluation, the patient denied the use of intravenous drugs; however, he described the use of opioids beginning when he was around 25years old. Since then, it appears that the longest that he has ever been able to maintain some type of cleanliness was the time in which he was in prison for one year. Concerns were raised about the patient's depression and its severity, the same as his need for detoxification with the case being presented to the physician on-call who kindly admitted the patient to my service and so the basis for this admission. The patient had a chronic history of opioid use disorder, which had been his drug of choice.   By chart review, most of the consultations that the undersigned found in his electronic medical chart were related to multiple medical problems including history of bradycardia, history of a lung nodule, the same as a prior history of thyroid disease with multinodular goiter being mentioned; however, none of those consultations with the exception of once when he was admitted to the care of Dr. Itzel Marie to inpatient treatment because of opioid withdrawal symptoms and also depression were found by chart review. The patient's history of depression appears to be multifactorial associated to problems with his family, some of them associated to his drug use in addition to other economical and job-related stressors. The patient had been in the methadone program before; however, using methadone as he said is [facility] using another drug. \"  He described being compliant when he was on it, however, due to his work, he apparently needed to leave town and so they canceled his attendance to the methadone program.  This happened two or three years ago. The patient also described not considering the use of Suboxone for the same reason. At the time of admission, his depression was characterized by helplessness, hopelessness and recurrent suicidal thoughts. No evidence of psychotic symptoms being noted upon evaluation. Multiple labs were performed at the time of the patient's admission to the emergency room including CBC with differential that showed normal results. Urinalysis showed the presence of bilirubin, otherwise negative results. Blood chemistry showed normal electrolytes, normal kidney function tests with slight elevation of creatinine to 1.34; however, estimated GFR above 60 mL/minute. Liver function tests showed normal enzymes with mild elevation of globulin to 5.4 and a total protein of 9.2, normal ALT and AST, the same as alkaline phosphatase. Alcohol blood levels below 3. Blood sugar 130. This was a postprandial test.  The CT scan of the abdomen due to abdominal pain showed no evidence of acute pathology; however, did show mild prominence of the proximal duodenum prior to crossing the SMA. They described the narrowing of the SMA with a possibility associated to an SMA syndrome with correlation suggested.   The presence of calcified pleural disease seen at the time of the prior imaging also described. COURSE DURING HOSPITALIZATION AND TREATMENT:  Admitted to the adult CD program, the patient has been seen daily and has been referred to the groups within context of the program.  Describing during this admission the presence of severe depression, he was prescribed with escitalopram (Lexapro), which was increased to 10 mg every morning in addition to his being prescribed with progress in regard to his back pain with Flexeril increased to 10 mg twice a day. The patient required to be detoxified from the use of opioids following the COWS protocol with good progress being noted. Suggested to go to a rehab program, attempts to refer him to a local program [facility]), the same as the [facility] in [place] failed due to the patient's insurance coverage type. His insurance did agree to his attending [facility] program treatment and so he has been given the option to go to [place] [address] to the [facility] program or [facility]. Again, he will make a decision in that respect. Otherwise, when seen today, considered to be much improved, we will proceed to discharge as indicated. During this admission, the other tests performed since the patient had shown a blood sugar of 130 included a lipid panel that showed triglycerides of 139, cholesterol 140, HDL 52, cholesterol-HDL ratio 2.7 and LDL of 60.2. A1c 5.7%. TSH normal at 0.91. An electrocardiogram had shown the presence of a sinus bradycardia which is an old finding, right bundle branch block, minimal voltage criteria for LVH, considered to be normal variant. Comparing to an electrocardiogram on 12/15/2018, QRS showed an axis shift to the right and T-wave inversion in the anterior leads.     CONDITION UPON DISCHARGE:  Upon examination by the undersigned today, the patient is found to be not suicidal nor homicidal, showing no evidence of psychosis and organicity and is considered to be psychiatrically competent. FINAL DIAGNOSES:  AXIS I:  Depressive disorder, not otherwise specified; opioid use disorder, severe; opioid withdrawal symptoms, moderately severe; cocaine use disorder, moderate; rule out opioid-induced mood disorder. AXIS II:  Deferred. AXIS III:  Remote history of Bell's palsy; chronic smoker; chronic history of back pain secondary to trauma; prior history of cardiomegaly and sinus bradycardia; pulmonary nodule by history, status post biopsy, remote; history of thyroid nodular disease; history of testicular torsion, remote. DISPOSITION:  As indicated, the patient is going to go back home. He is to decide between  attending [address] [place] [address] program or the [facility] program in [facility]. He has been advised to attend daily NA meetings x90 days and to obtain the help of a sponsor. For medical reasons, he should continue to see Dr. Mony Johnson, his primary care physician. PRESCRIPTIONS UPON DISCHARGE:  Two weeks' prescriptions for Lexapro 10 mg in the morning with one refill and Flexeril 10 mg twice a day also for 2 weeks one refill will be given. PROGNOSIS:  Fair to guarded depending upon treatment compliance and treatment response.       Richard Whitaker MD      [id number]  D:  01/10/2020 11:07  T:  01/11/2020 8:01  JOB #:  [id number]

## 2020-05-06 ENCOUNTER — VIRTUAL VISIT (OUTPATIENT)
Dept: CARDIOLOGY CLINIC | Age: 60
End: 2020-05-06

## 2020-05-06 NOTE — PROGRESS NOTES
Fernanda Sosa  61 y.o. who was seen by synchronous (real-time) audio-video technology on 3/31/2020. Consent:  She and/or her healthcare decision maker is aware that this patient-initiated Telehealth encounter is a billable service, with coverage as determined by her insurance carrier. She is aware that she may receive a bill and has provided verbal consent to proceed: YES    I was at home while conducting this encounter. HPI:      He had an echocardiogram at his primary care physicians office on 12/17/2015, which demonstrated normal LV function with EF in the range of 68%.  There was no evidence of significant valvular pathology.  Left atrial dimension was normal and so was the right atrial dimension.  He is currently an every-day smoker. [facility] does have an active history of substance abuse with heroin in the past thirty-five years. [facility] indicates that he gets nauseated quite often (almost daily) when his heroin level goes down. [facility] has had some chest pain, but he denies exertional pain.  He has no history of hypertension or diabetes mellitus.  He was seen at [facility] in December of 2015 with heroin withdrawal, at which time, he was found to have some bradycardia.  He works at [employer] and he does have good muscles in his arms and chest.         Coding Help - Use CPT Codes 48208-53533, 54778-00039 for Established and New Patients respectively, either employing EM elements or Time rules. Other codes (example consult codes) may also apply. I spent at least minutes 25 with this established patient, and >50% of the time was spent counseling and/or coordinating care regarding current and future cardiac care. 712    Current Outpatient Medications   Medication Sig    cyclobenzaprine (FLEXERIL) 10 mg tablet Take 1 Tab by mouth two (2) times a day. Indications: muscle spasm    escitalopram oxalate (LEXAPRO) 10 mg tablet Take 1 Tab by mouth daily.  Indications: Anxiousness associated with Depression  vardenafil (LEVITRA) 20 mg tablet Take 20 mg by mouth.  multivitamin, tx-iron-ca-min (THERA-M W/ IRON) 9 mg iron-400 mcg tab tablet Take 1 Tab by mouth daily.  naproxen sodium (ALEVE) 220 mg tablet Take 220 mg by mouth two (2) times daily (with meals).  nicotine buccal (POLACRILEX) 2 mg lozg lozenge Take 1 Lozenge by mouth every one (1) hour as needed for Smoking Cessation.  albuterol (PROVENTIL HFA, VENTOLIN HFA, PROAIR HFA) 90 mcg/actuation inhaler Take 1-2 Puffs by inhalation every four (4) hours as needed for Wheezing or Shortness of Breath. No current facility-administered medications for this visit. No Known Allergies       ROS           Vital Signs: (As obtained by patient/caregiver at home)  There were no vitals taken for this visit. Assessment & Plan:   No diagnosis found. DISCUSSION:        We discussed the expected course, resolution and complications of the diagnosis(es) in detail. Medication risks, benefits, costs, interactions, and alternatives were discussed as indicated. I advised her to contact the office if her condition worsens, changes or fails to improve as anticipated. She expressed understanding with the diagnosis(es) and plan. Pursuant to the emergency declaration under the [facility], [facility] waiver authority and the [employer], this Virtual  Visit was conducted, with patient's consent, to reduce the patient's risk of exposure to COVID-19 and provide continuity of care for an established patient. Services were provided through a video synchronous discussion virtually to substitute for in-person clinic visit.     Rosalinda Dale MD

## 2020-10-10 ENCOUNTER — HOSPITAL ENCOUNTER (EMERGENCY)
Age: 60
Discharge: HOME | End: 2020-10-10
Attending: EMERGENCY MEDICINE
Payer: COMMERCIAL

## 2020-10-10 ENCOUNTER — APPOINTMENT (OUTPATIENT)
Dept: GENERAL RADIOLOGY | Age: 60
End: 2020-10-10
Attending: PHYSICIAN ASSISTANT
Payer: COMMERCIAL

## 2020-10-10 VITALS
WEIGHT: 175 LBS | BODY MASS INDEX: 25.84 KG/M2 | TEMPERATURE: 97.3 F | RESPIRATION RATE: 16 BRPM | HEART RATE: 44 BPM | OXYGEN SATURATION: 99 % | SYSTOLIC BLOOD PRESSURE: 110 MMHG | DIASTOLIC BLOOD PRESSURE: 69 MMHG

## 2020-10-10 DIAGNOSIS — Z20.822 PERSON UNDER INVESTIGATION FOR COVID-19: ICD-10-CM

## 2020-10-10 DIAGNOSIS — R05.9 COUGH: Primary | ICD-10-CM

## 2020-10-10 LAB
ALBUMIN SERPL-MCNC: 2.9 G/DL (ref 3.4–5)
ALBUMIN/GLOB SERPL: 0.6 {RATIO} (ref 0.8–1.7)
ALP SERPL-CCNC: 82 U/L (ref 45–117)
ALT SERPL-CCNC: 38 U/L (ref 16–61)
ANION GAP SERPL CALC-SCNC: 1 MMOL/L (ref 3–18)
AST SERPL-CCNC: 52 U/L (ref 10–38)
BASOPHILS # BLD: 0 K/UL (ref 0–0.1)
BASOPHILS NFR BLD: 1 % (ref 0–2)
BILIRUB SERPL-MCNC: 0.6 MG/DL (ref 0.2–1)
BNP SERPL-MCNC: 318 PG/ML (ref 0–900)
BUN SERPL-MCNC: 10 MG/DL (ref 7–18)
BUN/CREAT SERPL: 7 (ref 12–20)
CALCIUM SERPL-MCNC: 8.7 MG/DL (ref 8.5–10.1)
CHLORIDE SERPL-SCNC: 107 MMOL/L (ref 100–111)
CK MB CFR SERPL CALC: 0.6 % (ref 0–4)
CK MB SERPL-MCNC: 4.8 NG/ML (ref 5–25)
CK SERPL-CCNC: 749 U/L (ref 39–308)
CO2 SERPL-SCNC: 32 MMOL/L (ref 21–32)
CREAT SERPL-MCNC: 1.41 MG/DL (ref 0.6–1.3)
DIFFERENTIAL METHOD BLD: ABNORMAL
EOSINOPHIL # BLD: 0.1 K/UL (ref 0–0.4)
EOSINOPHIL NFR BLD: 2 % (ref 0–5)
ERYTHROCYTE [DISTWIDTH] IN BLOOD BY AUTOMATED COUNT: 15.2 % (ref 11.6–14.5)
GLOBULIN SER CALC-MCNC: 4.7 G/DL (ref 2–4)
GLUCOSE SERPL-MCNC: 128 MG/DL (ref 74–99)
HCT VFR BLD AUTO: 43 % (ref 36–48)
HGB BLD-MCNC: 13.4 G/DL (ref 13–16)
LYMPHOCYTES # BLD: 2.1 K/UL (ref 0.9–3.6)
LYMPHOCYTES NFR BLD: 51 % (ref 21–52)
MCH RBC QN AUTO: 26 PG (ref 24–34)
MCHC RBC AUTO-ENTMCNC: 31.2 G/DL (ref 31–37)
MCV RBC AUTO: 83.3 FL (ref 74–97)
MONOCYTES # BLD: 0.5 K/UL (ref 0.05–1.2)
MONOCYTES NFR BLD: 12 % (ref 3–10)
NEUTS SEG # BLD: 1.4 K/UL (ref 1.8–8)
NEUTS SEG NFR BLD: 34 % (ref 40–73)
PLATELET # BLD AUTO: 210 K/UL (ref 135–420)
PMV BLD AUTO: 12 FL (ref 9.2–11.8)
POTASSIUM SERPL-SCNC: 3.9 MMOL/L (ref 3.5–5.5)
PROT SERPL-MCNC: 7.6 G/DL (ref 6.4–8.2)
RBC # BLD AUTO: 5.16 M/UL (ref 4.7–5.5)
SODIUM SERPL-SCNC: 140 MMOL/L (ref 136–145)
TROPONIN I SERPL-MCNC: <0.02 NG/ML (ref 0–0.04)
WBC # BLD AUTO: 4.1 K/UL (ref 4.6–13.2)

## 2020-10-10 PROCEDURE — 74011636637 HC RX REV CODE- 636/637: Performed by: PHYSICIAN ASSISTANT

## 2020-10-10 PROCEDURE — 83880 ASSAY OF NATRIURETIC PEPTIDE: CPT

## 2020-10-10 PROCEDURE — 87635 SARS-COV-2 COVID-19 AMP PRB: CPT

## 2020-10-10 PROCEDURE — 71045 X-RAY EXAM CHEST 1 VIEW: CPT

## 2020-10-10 PROCEDURE — 80053 COMPREHEN METABOLIC PANEL: CPT

## 2020-10-10 PROCEDURE — 99283 EMERGENCY DEPT VISIT LOW MDM: CPT

## 2020-10-10 PROCEDURE — 74011000250 HC RX REV CODE- 250: Performed by: PHYSICIAN ASSISTANT

## 2020-10-10 PROCEDURE — 82550 ASSAY OF CK (CPK): CPT

## 2020-10-10 PROCEDURE — 93005 ELECTROCARDIOGRAM TRACING: CPT

## 2020-10-10 PROCEDURE — 94640 AIRWAY INHALATION TREATMENT: CPT

## 2020-10-10 PROCEDURE — 85025 COMPLETE CBC W/AUTO DIFF WBC: CPT

## 2020-10-10 RX ORDER — IPRATROPIUM BROMIDE AND ALBUTEROL SULFATE 2.5; .5 MG/3ML; MG/3ML
3 SOLUTION RESPIRATORY (INHALATION) ONCE
Status: COMPLETED | OUTPATIENT
Start: 2020-10-10 | End: 2020-10-10

## 2020-10-10 RX ORDER — AZITHROMYCIN 250 MG/1
TABLET, FILM COATED ORAL
Qty: 6 TAB | Refills: 0 | Status: SHIPPED | OUTPATIENT
Start: 2020-10-10 | End: 2020-10-15

## 2020-10-10 RX ORDER — PREDNISONE 20 MG/1
60 TABLET ORAL
Status: COMPLETED | OUTPATIENT
Start: 2020-10-10 | End: 2020-10-10

## 2020-10-10 RX ORDER — ALBUTEROL SULFATE 90 UG/1
2 AEROSOL, METERED RESPIRATORY (INHALATION)
Qty: 1 INHALER | Refills: 0 | Status: SHIPPED | OUTPATIENT
Start: 2020-10-10

## 2020-10-10 RX ADMIN — IPRATROPIUM BROMIDE AND ALBUTEROL SULFATE 3 ML: .5; 3 SOLUTION RESPIRATORY (INHALATION) at 17:20

## 2020-10-10 RX ADMIN — PREDNISONE 60 MG: 20 TABLET ORAL at 17:20

## 2020-10-10 NOTE — ED TRIAGE NOTES
Pt presents to ED via triage from home with complaints of cough, chills and body aches x 3 days. Pt is wanting to be tested for covid.        Past Medical History:   Diagnosis Date    Acid indigestion     Anemia NEC     Back pain     Cardiomegaly     1/12 LVH on EKG    Chest pain, unspecified     Drug use     Heroin causing adverse effect in therapeutic use     Irregular heart beat     Nonspecific abnormal electrocardiogram (ECG) (EKG)     S Ronak, RBBB    Pulmonary nodule     Tachycardia     Tightness in chest     Tiredness     Tobacco use disorder

## 2020-10-10 NOTE — DISCHARGE INSTRUCTIONS
Patient Education        Learning About Coronavirus [phone])  Coronavirus [phone]): Overview  What is coronavirus ([id number])? The coronavirus disease (COVID-19) is caused by a virus. It is an illness that was first found in December 2019. It has since spread worldwide. The virus can cause fever, cough, and trouble breathing. In severe cases, it can cause pneumonia and make it hard to breathe without help. It can cause death. This virus spreads person-to-person through droplets from coughing and sneezing. It can also spread when you are close to someone who is infected. And it can spread when you touch something that has the virus on it, such as a doorknob or a tabletop. Coronaviruses are a large group of viruses. They cause the common cold. They also cause more serious illnesses like [place] respiratory syndrome (MERS) and severe acute respiratory syndrome (SARS). COVID-19 is caused by a novel coronavirus. That means it's a new type that has not been seen in people before. How is COVID-19 treated? Mild illness can be treated at home, but more serious illness needs to be treated in the hospital. Treatment may include medicines to reduce symptoms, plus breathing support such as oxygen therapy or a ventilator. Other treatments, such as antiviral medicines, may help people who have COVID-19. What can you do to protect yourself from COVID-19? The best way to protect yourself from getting sick is to:  · Avoid areas where there is an outbreak. · Avoid contact with people who may be infected. · Avoid crowds and try to stay at least 6 feet away from other people. · Wash your hands often, especially after you cough or sneeze. Use soap and water, and scrub for at least 20 seconds. If soap and water aren't available, use an alcohol-based hand . · Avoid touching your mouth, nose, and eyes. What can you do to avoid spreading the virus to others?   To help avoid spreading the virus to others:  · [employer] your hands often with soap or alcohol-based hand sanitizers. · Cover your mouth with a tissue when you cough or sneeze. Then throw the tissue in the trash. · Use a disinfectant to clean things that you touch often. These include doorknobs, remote controls, phones, and handles on your refrigerator and microwave. And don't forget countertops, tabletops, bathrooms, and computer keyboards. · Wear a cloth face cover if you have to go to public areas. If you know or suspect that you have COVID-19:  · Stay home. Don't go to school, work, or public areas. And don't use public transportation, ride-shares, or taxis unless you have no choice. · Leave your home only if you need to get medical care or testing. But call the doctor's office first so they know you're coming. And wear a face cover. · Limit contact with people in your home. If possible, stay in a separate bedroom and use a separate bathroom. · Wear a face cover whenever you're around other people. It can help stop the spread of the virus when you cough or sneeze. · Clean and disinfect your home every day. Use household  and disinfectant wipes or sprays. Take special care to clean things that you grab with your hands. · Self-isolate until it's safe to be around others again. ? If you have symptoms, it's safe when you haven't had a fever for 3 days and your symptoms have improved and it's been at least 10 days since your symptoms started. ? If you were exposed to the virus but don't have symptoms, it's safe to be around others 14 days after exposure. ? Talk to your doctor about whether you also need testing, especially if you have a weakened immune system. When to call for help  Call 911 anytime you think you may need emergency care. For example, call if:  · You have severe trouble breathing. (You can't talk at all.)  · You have constant chest pain or pressure. · You are severely dizzy or lightheaded.   · You are confused or can't think clearly. · Your face and lips have a blue color. · You passed out (lost consciousness) or are very hard to wake up. Call your doctor now if you develop symptoms such as:  · Shortness of breath. · Fever. · Cough. If you need to get care, call ahead to the doctor's office for instructions before you go. Make sure you wear a face cover to prevent exposing other people to the virus. Where can you get the latest information? The following health organizations are tracking and studying this virus. Their websites contain the most up-to-date information. Fabiola Franciscoen also learn what to do if you think you may have been exposed to the virus. · U.S. Centers for Disease Control and Prevention (CDC): The CDC provides updated news about the disease and travel advice. The website also tells you how to prevent the spread of infection. www.cdc.gov  · World Health Organization [facility]: WHO offers information about the virus outbreaks. WHO also has travel advice. www.who.int  Current as of: July 10, 2020               Content Version: 12.6  © 2006-2020 [employer]. Care instructions adapted under license by [employer] (which disclaims liability or warranty for this information). If you have questions about a medical condition or this instruction, always ask your healthcare professional. [address] any warranty or liability for your use of this information. Patient Education        Coronavirus ([id number]-20): Care Instructions  Overview  The coronavirus disease (COVID-19) is caused by a virus. Symptoms may include a fever, a cough, and shortness of breath. It mainly spreads person-to-person through droplets from coughing and sneezing. The virus also can spread when people are in close contact with someone who is infected. Most people have mild symptoms and can take care of themselves at home.  If their symptoms get worse, they may need care in a hospital. Treatment may include medicines to reduce symptoms, plus breathing support such as oxygen therapy or a ventilator. It's important to not spread the virus to others. If you have COVID-19, wear a face cover anytime you are around other people. You need to isolate yourself while you are sick. Leave your home only if you need to get medical care or testing. Follow-up care is a key part of your treatment and safety. Be sure to make and go to all appointments, and call your doctor if you are having problems. It's also a good idea to know your test results and keep a list of the medicines you take. How can you care for yourself at home? · Get extra rest. It can help you feel better. · Drink plenty of fluids. This helps replace fluids lost from fever. Fluids also help ease a scratchy throat. Water, soup, fruit juice, and hot tea with lemon are good choices. · Take acetaminophen (such as Tylenol) to reduce a fever. It may also help with muscle aches. Read and follow all instructions on the label. · Use petroleum jelly on sore skin. This can help if the skin around your nose and lips becomes sore from rubbing a lot with tissues. Tips for self-isolation  · Limit contact with people in your home. If possible, stay in a separate bedroom and use a separate bathroom. · Wear a cloth face cover when you are around other people. It can help stop the spread of the virus when you cough or sneeze. · If you have to leave home, avoid crowds and try to stay at least 6 feet away from other people. · Avoid contact with pets and other animals. · Cover your mouth and nose with a tissue when you cough or sneeze. Then throw it in the trash right away. · Wash your hands often, especially after you cough or sneeze. Use soap and water, and scrub for at least 20 seconds. If soap and water aren't available, use an alcohol-based hand . · Don't share personal household items.  These include bedding, towels, cups and glasses, and eating utensils. · [address] in the warmest water allowed for the fabric type, and dry it completely. It's okay to wash other people's laundry with yours. · Clean and disinfect your home every day. Use household  and disinfectant wipes or sprays. Take special care to clean things that you grab with your hands. These include doorknobs, remote controls, phones, and handles on your refrigerator and microwave. And don't forget countertops, tabletops, bathrooms, and computer keyboards. When you can end self-isolation  · If you know or suspect that you have COVID-19, stay in self-isolation until:  ? You haven't had a fever for 3 days, and  ? Your symptoms have improved, and  ? It's been at least 10 days since your symptoms started. · Talk to your doctor about whether you also need testing, especially if you have a weakened immune system. When should you call for help? Call 911 anytime you think you may need emergency care. For example, call if you have life-threatening symptoms, such as:    · You have severe trouble breathing. (You can't talk at all.)     · You have constant chest pain or pressure.     · You are severely dizzy or lightheaded.     · You are confused or can't think clearly.     · Your face and lips have a blue color.     · You pass out (lose consciousness) or are very hard to wake up. Call your doctor now or seek immediate medical care if:    · You have moderate trouble breathing. (You can't speak a full sentence.)     · You are coughing up blood (more than about 1 teaspoon).     · You have signs of low blood pressure. These include feeling lightheaded; being too weak to stand; and having cold, pale, clammy skin. Watch closely for changes in your health, and be sure to contact your doctor if:    · Your symptoms get worse.     · You are not getting better as expected. Call before you go to the doctor's office. Follow their instructions. And wear a cloth face cover.   Current as of: July 10, 2020               Content Version: 12.6  © 9029-6900 [employer]. Care instructions adapted under license by [employer] (which disclaims liability or warranty for this information). If you have questions about a medical condition or this instruction, always ask your healthcare professional. [address] any warranty or liability for your use of this information. Patient Education        Cough: Care Instructions  Your Care Instructions     A cough is your body's response to something that bothers your throat or airways. Many things can cause a cough. You might cough because of a cold or the flu, bronchitis, or asthma. Smoking, postnasal drip, allergies, and stomach acid that backs up into your throat also can cause coughs. A cough is a symptom, not a disease. Most coughs stop when the cause, such as a cold, goes away. You can take a few steps at home to cough less and feel better. Follow-up care is a key part of your treatment and safety. Be sure to make and go to all appointments, and call your doctor if you are having problems. It's also a good idea to know your test results and keep a list of the medicines you take. How can you care for yourself at home? · Drink lots of water and other fluids. This helps thin the mucus and soothes a dry or sore throat. Honey or lemon juice in hot water or tea may ease a dry cough. · Take cough medicine as directed by your doctor. · Prop up your head on pillows to help you breathe and ease a dry cough. · Try cough drops to soothe a dry or sore throat. Cough drops don't stop a cough. Medicine-flavored cough drops are no better than candy-flavored drops or hard candy. · Do not smoke. Avoid secondhand smoke. If you need help quitting, talk to your doctor about stop-smoking programs and medicines. These can increase your chances of quitting for good. When should you call for help?    Call 911 anytime you think you may need emergency care. For example, call if:    · You have severe trouble breathing. Call your doctor now or seek immediate medical care if:    · You cough up blood.     · You have new or worse trouble breathing.     · You have a new or higher fever.     · You have a new rash. Watch closely for changes in your health, and be sure to contact your doctor if:    · You cough more deeply or more often, especially if you notice more mucus or a change in the color of your mucus.     · You have new symptoms, such as a sore throat, an earache, or sinus pain.     · You do not get better as expected. Where can you learn more? Go to http://kelly-james.info/  Enter D279 in the search box to learn more about \"Cough: Care Instructions. \"  Current as of: February 24, 2020               Content Version: 12.6  © 4477-9654 [employer]. Care instructions adapted under license by [employer] (which disclaims liability or warranty for this information). If you have questions about a medical condition or this instruction, always ask your healthcare professional. [address] any warranty or liability for your use of this information.

## 2020-10-10 NOTE — LETTER
NOTIFICATION RETURN TO WORK / SCHOOL 
 
10/10/2020 6:11 PM 
 
Mr. Brandin Cabral [address] [facility] [facility] [address] To Whom It May Concern: 
 
Brandin Cabral is currently under the care of [facility]. He will return to work/school on: 10/24/20 or until a negative covid test results. If there are questions or concerns please have the patient contact our office. Sincerely, Umesh Holm PA-C

## 2020-10-10 NOTE — ED PROVIDER NOTES
EMERGENCY DEPARTMENT HISTORY AND PHYSICAL EXAM    Date: 10/10/2020  Patient Name: Osvaldo Barboza    History of Presenting Illness     Chief Complaint   Patient presents with    Covid Testing         History Provided By: patient        Additional History (Context): Osvaldo Barboza is a 70-year-old male with noted past medical history as listed below presenting to the emergency department with request for Covid test.  Patient states over the past 2 days he has had dry cough and subjective fever. Patient states he may have been exposed to Covid at work as he works with a lot of [employer] in close contact. Patient states he has had a dry cough but denies productive sputum, hemoptysis, shortness of breath, edema or chest pain. States he does smoke, denies recent drug use or alcohol use. PCP: Frederic Bernard MD    Current Outpatient Medications   Medication Sig Dispense Refill    albuterol (PROVENTIL HFA, VENTOLIN HFA, PROAIR HFA) 90 mcg/actuation inhaler Take 2 Puffs by inhalation every four (4) hours as needed for Wheezing. 1 Inhaler 1    escitalopram oxalate (LEXAPRO) 10 mg tablet Take 1 Tab by mouth daily. Indications: Anxiousness associated with Depression 15 Tab 1    vardenafil (LEVITRA) 20 mg tablet Take 20 mg by mouth.  naproxen sodium (ALEVE) 220 mg tablet Take 220 mg by mouth two (2) times daily (with meals).  nicotine buccal (POLACRILEX) 2 mg lozg lozenge Take 1 Lozenge by mouth every one (1) hour as needed for Smoking Cessation.  300 Lozenge 1       Past History     Past Medical History:  Past Medical History:   Diagnosis Date    Acid indigestion     Anemia NEC     Back pain     Cardiomegaly     1/12 LVH on EKG    Chest pain, unspecified     Drug use     Heroin causing adverse effect in therapeutic use     Irregular heart beat     Nonspecific abnormal electrocardiogram (ECG) (EKG)     S Ronak, RBBB    Pulmonary nodule     Tachycardia     Tightness in chest     Tiredness  Tobacco use disorder        Past Surgical History:  Past Surgical History:   Procedure Laterality Date    HX CIRCUMCISION      HX CIRCUMCISION      HX OTHER SURGICAL      HX TORSION OF THE TESTES    HX OTHER SURGICAL  2007 approx    lung biopsy       Family History:  Family History   Problem Relation Age of Onset    Diabetes Mother     Hypertension Mother     Thyroid Disease Mother     High Cholesterol Mother     Cancer Father     Heart Disease Father     Hypertension Father     Diabetes Sister     Hypertension Sister     Hypertension Brother     Hypertension Sister     High Cholesterol Sister        Social History:  Social History     Tobacco Use    Smoking status: Current Every Day Smoker     Packs/day: 1.00     Years: 34.00     Pack years: 34.00     Types: Cigarettes    Smokeless tobacco: Never Used   Substance Use Topics    Alcohol use: No     Alcohol/week: 0.8 standard drinks     Types: 1 Cans of beer per week     Comment: occasional    Drug use: Yes     Types: Marijuana, Heroin, Cocaine     Comment: denies heroin at this time, endorses some marijuan and cocaine       Allergies:  No Known Allergies      Review of Systems       Review of Systems   Constitutional: Negative for chills and fever. HENT: Negative for nasal congestion, sore throat, rhinorrhea  Eyes: Negative. Respiratory: Positive for cough and negative for shortness of breath. Cardiovascular: Negative for chest pain and palpitations. Gastrointestinal: Negative for abdominal pain, constipation, diarrhea, nausea and vomiting. Genitourinary: Negative. Negative for difficulty urinating and flank pain. Musculoskeletal: Negative for back pain. Negative for gait problem and neck pain. Skin: Negative. Allergic/Immunologic: Negative. Neurological: Negative for dizziness, weakness, numbness and headaches. Psychiatric/Behavioral: Negative. All other systems reviewed and are negative.   All Other Systems Negative  Physical Exam     Vitals:    10/10/20 1526   BP: 110/69   Pulse: (!) 44   Resp: 16   Temp: 97.3 °F (36.3 °C)   SpO2: 99%   Weight: 79.4 kg (175 lb)     Physical Exam  Vitals signs and nursing note reviewed. Constitutional:       General: He is not in acute distress. Appearance: He is well-developed. He is not diaphoretic. HENT:      Head: Normocephalic and atraumatic. Nose: Nose normal.   Eyes:      Conjunctiva/sclera: Conjunctivae normal.      Pupils: Pupils are equal, round, and reactive to light. Neck:      Musculoskeletal: Normal range of motion and neck supple. Cardiovascular:      Rate and Rhythm: Normal rate and regular rhythm. Pulses: Normal pulses. Heart sounds: Normal heart sounds. Pulmonary:      Effort: Pulmonary effort is normal. No respiratory distress. Breath sounds: Normal breath sounds. No stridor. No wheezing, rhonchi or rales. Chest:      Chest wall: No tenderness. Abdominal:      Palpations: Abdomen is soft. Musculoskeletal: Normal range of motion. Skin:     General: Skin is warm. Findings: No rash. Neurological:      Mental Status: He is alert and oriented to person, place, and time. Cranial Nerves: No cranial nerve deficit. Coordination: Coordination normal.   Psychiatric:         Behavior: Behavior normal.           Diagnostic Study Results     Labs -     Recent Results (from the past 12 hour(s))   CBC WITH AUTOMATED DIFF    Collection Time: 10/10/20  4:45 PM   Result Value Ref Range    WBC 4.1 (L) 4.6 - 13.2 K/uL    RBC 5.16 4.70 - 5.50 M/uL    HGB 13.4 13.0 - 16.0 g/dL    HCT 43.0 36.0 - 48.0 %    MCV 83.3 74.0 - 97.0 FL    MCH 26.0 24.0 - 34.0 PG    MCHC 31.2 31.0 - 37.0 g/dL    RDW 15.2 (H) 11.6 - 14.5 %    PLATELET 422 297 - 927 K/uL    MPV 12.0 (H) 9.2 - 11.8 FL    NEUTROPHILS 34 (L) 40 - 73 %    LYMPHOCYTES 51 21 - 52 %    MONOCYTES 12 (H) 3 - 10 %    EOSINOPHILS 2 0 - 5 %    BASOPHILS 1 0 - 2 %    ABS.  NEUTROPHILS 1.4 (L) 1.8 - 8.0 K/UL    ABS. LYMPHOCYTES 2.1 0.9 - 3.6 K/UL    ABS. MONOCYTES 0.5 0.05 - 1.2 K/UL    ABS. EOSINOPHILS 0.1 0.0 - 0.4 K/UL    ABS. BASOPHILS 0.0 0.0 - 0.1 K/UL    DF AUTOMATED     METABOLIC PANEL, COMPREHENSIVE    Collection Time: 10/10/20  4:45 PM   Result Value Ref Range    Sodium 140 136 - 145 mmol/L    Potassium 3.9 3.5 - 5.5 mmol/L    Chloride 107 100 - 111 mmol/L    CO2 32 21 - 32 mmol/L    Anion gap 1 (L) 3.0 - 18 mmol/L    Glucose 128 (H) 74 - 99 mg/dL    BUN 10 7.0 - 18 MG/DL    Creatinine 1.41 (H) 0.6 - 1.3 MG/DL    BUN/Creatinine ratio 7 (L) 12 - 20      GFR est AA >60 >60 ml/min/1.73m2    GFR est non-AA 51 (L) >60 ml/min/1.73m2    Calcium 8.7 8.5 - 10.1 MG/DL    Bilirubin, total 0.6 0.2 - 1.0 MG/DL    ALT (SGPT) 38 16 - 61 U/L    AST (SGOT) 52 (H) 10 - 38 U/L    Alk. phosphatase 82 45 - 117 U/L    Protein, total 7.6 6.4 - 8.2 g/dL    Albumin 2.9 (L) 3.4 - 5.0 g/dL    Globulin 4.7 (H) 2.0 - 4.0 g/dL    A-G Ratio 0.6 (L) 0.8 - 1.7     CARDIAC PANEL,(CK, CKMB & TROPONIN)    Collection Time: 10/10/20  4:45 PM   Result Value Ref Range    CK - MB 4.8 (H) <3.6 ng/ml    CK-MB Index 0.6 0.0 - 4.0 %     (H) 39 - 308 U/L    Troponin-I, QT <0.02 0.0 - 0.045 NG/ML   NT-PRO BNP    Collection Time: 10/10/20  4:45 PM   Result Value Ref Range    NT pro- 0 - 900 PG/ML       Radiologic Studies -   XR CHEST PORT    (Results Pending)     CT Results  (Last 48 hours)    None        CXR Results  (Last 48 hours)    None            Medical Decision Making   I am the first provider for this patient. I reviewed the vital signs, available nursing notes, past medical history, past surgical history, family history and social history. Vital Signs-Reviewed the patient's vital signs.         Records Reviewed: Nursing notes, old medical records and any previous labs, imaging, visits, consultations pertinent to patient care    Procedures:  Procedures      ED Course: Progress Notes, Reevaluation, and Consults:      Provider Notes (Medical Decision Making):   59-year-old male here with complaint of subjective fever and dry cough. No associated shortness of breath or chest pain or edema. Patient is a smoker and history of heroin use. Vital signs are stable and exam has mild wheezing but patient has no distress. Wheezing almost resolved after DuoNeb and steroid given in ED. Labs are reviewed and within normal limits. Chest x-ray does not show infection per my interpretation however due to medical history will prescribe antibiotic for and have patient follow-up with primary care. Covid test is sent and patient is given stay at home orders until results are back. Patient has no symptoms to be concerned for ACS or PE.      MED RECONCILIATION:  No current facility-administered medications for this encounter. Current Outpatient Medications   Medication Sig    albuterol (PROVENTIL HFA, VENTOLIN HFA, PROAIR HFA) 90 mcg/actuation inhaler Take 2 Puffs by inhalation every four (4) hours as needed for Wheezing.  escitalopram oxalate (LEXAPRO) 10 mg tablet Take 1 Tab by mouth daily. Indications: Anxiousness associated with Depression    vardenafil (LEVITRA) 20 mg tablet Take 20 mg by mouth.  naproxen sodium (ALEVE) 220 mg tablet Take 220 mg by mouth two (2) times daily (with meals).  nicotine buccal (POLACRILEX) 2 mg lozg lozenge Take 1 Lozenge by mouth every one (1) hour as needed for Smoking Cessation. Disposition:  home    DISCHARGE NOTE:     Pt has been reexamined. Patient has no new complaints, changes, or physical findings. Care plan outlined and precautions discussed. Discussed proper way to take medications. Discussed treatment plan, return precautions, symptomatic relief, and expected time to improvement. All questions answered. Patient is stable for discharge and outpatient management. Patient is ready to go home.     Follow-up Information    None         Current Discharge Medication List                Diagnosis     Clinical Impression:   1. Cough    2. Person under investigation for COVID-19            Dictation disclaimer:  Please note that this dictation was completed with [email or web address], the computer voice recognition software. Quite often unanticipated grammatical, syntax, homophones, and other interpretive errors are inadvertently transcribed by the computer software. Please disregard these errors. Please excuse any errors that have escaped final proofreading.

## 2020-10-11 LAB
ATRIAL RATE: 46 BPM
CALCULATED P AXIS, ECG09: 73 DEGREES
CALCULATED R AXIS, ECG10: 77 DEGREES
CALCULATED T AXIS, ECG11: 56 DEGREES
DIAGNOSIS, 93000: NORMAL
P-R INTERVAL, ECG05: 154 MS
Q-T INTERVAL, ECG07: 488 MS
QRS DURATION, ECG06: 144 MS
QTC CALCULATION (BEZET), ECG08: 427 MS
SARS-COV-2, COV2NT: NOT DETECTED
VENTRICULAR RATE, ECG03: 46 BPM

## 2021-02-18 ENCOUNTER — HOSPITAL ENCOUNTER (EMERGENCY)
Age: 61
Discharge: HOME | End: 2021-02-18
Attending: EMERGENCY MEDICINE
Payer: MEDICAID

## 2021-02-18 VITALS
WEIGHT: 170 LBS | TEMPERATURE: 96.2 F | HEIGHT: 69 IN | DIASTOLIC BLOOD PRESSURE: 75 MMHG | HEART RATE: 57 BPM | SYSTOLIC BLOOD PRESSURE: 130 MMHG | RESPIRATION RATE: 18 BRPM | OXYGEN SATURATION: 100 % | BODY MASS INDEX: 25.18 KG/M2

## 2021-02-18 DIAGNOSIS — S39.012A BACK STRAIN, INITIAL ENCOUNTER: Primary | ICD-10-CM

## 2021-02-18 PROCEDURE — 99281 EMR DPT VST MAYX REQ PHY/QHP: CPT

## 2021-02-18 RX ORDER — DIAZEPAM 5 MG/1
5 TABLET ORAL
Qty: 20 TAB | Refills: 0 | Status: SHIPPED | OUTPATIENT
Start: 2021-02-18

## 2021-02-18 RX ORDER — ACETAMINOPHEN 325 MG/1
650 TABLET ORAL
Qty: 20 TAB | Refills: 0 | Status: SHIPPED | OUTPATIENT
Start: 2021-02-18

## 2021-02-18 RX ORDER — KETOROLAC TROMETHAMINE 10 MG/1
10 TABLET, FILM COATED ORAL
Qty: 20 TAB | Refills: 0 | Status: SHIPPED | OUTPATIENT
Start: 2021-02-18 | End: 2021-02-23

## 2021-02-18 NOTE — ED PROVIDER NOTES
EMERGENCY DEPARTMENT HISTORY AND PHYSICAL EXAM    Date: 2/18/2021  Patient Name: Chau Ryder    History of Presenting Illness     Chief Complaint   Patient presents with    Back Pain         History Provided By: Patient    Chief Complaint: Back pain  Duration: Prior to arrival  Timing: Acute  Location: Left lower back  Quality: \"Like I pulled a muscle\"  Severity: Moderate  Modifying Factors: Worse after lifting multiple bags at work  Associated Symptoms: none       Additional History (Context): Chau Ryder is a 61 y.o. male with a history of back pain, anemia and substance abuse who presents today for issues listed above. 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 Patient states it feels very similar to a pulled muscle. Has not tried thing for this prior to arrival.  Does have a spine specialist however did not call them. Denies any loss of bowel or bladder. Denies any fall. Denies any loss of bowel or bladder. Denies any urinary symptoms. PCP: Todd Smart MD    Current Outpatient Medications   Medication Sig Dispense Refill    acetaminophen (TYLENOL) 325 mg tablet Take 2 Tabs by mouth every four (4) hours as needed for Pain. 20 Tab 0    ketorolac (TORADOL) 10 mg tablet Take 1 Tab by mouth every six (6) hours as needed for Pain for up to 5 days. 20 Tab 0    diazePAM (Valium) 5 mg tablet Take 1 Tab by mouth three (3) times daily as needed for Muscle Spasm(s) (spasm). Max Daily Amount: 15 mg. 20 Tab 0    albuterol (PROVENTIL HFA, VENTOLIN HFA, PROAIR HFA) 90 mcg/actuation inhaler Take 2 Puffs by inhalation every four (4) hours as needed for Wheezing. 1 Inhaler 0    albuterol (PROVENTIL HFA, VENTOLIN HFA, PROAIR HFA) 90 mcg/actuation inhaler Take 2 Puffs by inhalation every four (4) hours as needed for Wheezing.  1 Inhaler 1    escitalopram oxalate (LEXAPRO) 10 mg tablet Take 1 Tab by mouth daily. Indications: Anxiousness associated with Depression 15 Tab 1    vardenafil (LEVITRA) 20 mg tablet Take 20 mg by mouth.  naproxen sodium (ALEVE) 220 mg tablet Take 220 mg by mouth two (2) times daily (with meals).  nicotine buccal (POLACRILEX) 2 mg lozg lozenge Take 1 Lozenge by mouth every one (1) hour as needed for Smoking Cessation.  300 Lozenge 1       Past History     Past Medical History:  Past Medical History:   Diagnosis Date    Acid indigestion     Anemia NEC     Back pain     Cardiomegaly     1/12 LVH on EKG    Chest pain, unspecified     Drug use     Heroin causing adverse effect in therapeutic use     Irregular heart beat     Nonspecific abnormal electrocardiogram (ECG) (EKG)     S Ronak, RBBB    Pulmonary nodule     Tachycardia     Tightness in chest     Tiredness     Tobacco use disorder        Past Surgical History:  Past Surgical History:   Procedure Laterality Date    HX CIRCUMCISION      HX CIRCUMCISION      HX OTHER SURGICAL      HX TORSION OF THE TESTES    HX OTHER SURGICAL  2007 approx    lung biopsy       Family History:  Family History   Problem Relation Age of Onset    Diabetes Mother     Hypertension Mother     Thyroid Disease Mother     High Cholesterol Mother     Cancer Father     Heart Disease Father     Hypertension Father     Diabetes Sister     Hypertension Sister     Hypertension Brother     Hypertension Sister     High Cholesterol Sister        Social History:  Social History     Tobacco Use    Smoking status: Current Every Day Smoker     Packs/day: 1.00     Years: 34.00     Pack years: 34.00     Types: Cigarettes    Smokeless tobacco: Never Used   Substance Use Topics    Alcohol use: No     Alcohol/week: 0.8 standard drinks     Types: 1 Cans of beer per week     Comment: occasional    Drug use: Yes     Types: Marijuana, Heroin, Cocaine     Comment: denies heroin at this time, endorses some marijuan and cocaine Allergies:  No Known Allergies      Review of Systems   Review of Systems   Constitutional: Negative for chills and fever. HENT: Negative for congestion, rhinorrhea and sore throat. Respiratory: Negative for cough and shortness of breath. Cardiovascular: Negative for chest pain. Gastrointestinal: Negative for abdominal pain, blood in stool, constipation, diarrhea, nausea and vomiting. Genitourinary: Negative for dysuria, frequency and hematuria. Musculoskeletal: Positive for back pain. Negative for myalgias. Skin: Negative for rash and wound. Neurological: Negative for dizziness and headaches. All other systems reviewed and are negative. All Other Systems Negative  Physical Exam     Vitals:    02/18/21 1240   BP: 130/75   Pulse: (!) 57   Resp: 18   Temp: (!) 96.2 °F (35.7 °C)   SpO2: 100%   Weight: 77.1 kg (170 lb)   Height: 5' 9\" (1.753 m)     Physical Exam  Vitals signs and nursing note reviewed. Constitutional:       General: He is not in acute distress. Appearance: He is well-developed. He is not diaphoretic. HENT:      Head: Normocephalic and atraumatic. Eyes:      Conjunctiva/sclera: Conjunctivae normal.   Neck:      Musculoskeletal: Normal range of motion and neck supple. Cardiovascular:      Rate and Rhythm: Normal rate and regular rhythm. Heart sounds: Normal heart sounds. Pulmonary:      Effort: Pulmonary effort is normal. No respiratory distress. Breath sounds: Normal breath sounds. Chest:      Chest wall: No tenderness. Abdominal:      General: Bowel sounds are normal. There is no distension. Palpations: Abdomen is soft. Tenderness: There is no abdominal tenderness. There is no guarding or rebound. Musculoskeletal: Normal range of motion. General: No deformity. Lumbar back: He exhibits tenderness. Comments: Left Lower  paralumbar reproducible tenderness to palpation. No Lumbar midline TTP.  No other midline vertebral bony point tenderness or step-off. No foot drop. Distal sensation intact bilaterally, normal gait, no saddle anesthesia. Bilateral DP 2+. - bilateral straight leg raise. Skin:     General: Skin is warm and dry. Neurological:      Mental Status: He is alert and oriented to person, place, and time. Diagnostic Study Results     Labs -   No results found for this or any previous visit (from the past 12 hour(s)). Radiologic Studies -   No orders to display     CT Results  (Last 48 hours)    None        CXR Results  (Last 48 hours)    None            Medical Decision Making   I am the first provider for this patient. I reviewed the vital signs, available nursing notes, past medical history, past surgical history, family history and social history. Vital Signs-Reviewed the patient's vital signs. Records Reviewed: Nursing Notes and Old Medical Records     Procedures: None   Procedures    Provider Notes (Medical Decision Making):     Differential Diagnosis: Musculoskeletal pain, myofascial strain/sprain, muscle spasm, spondylolisthesis, spondylosis, DJD, OA, sciatica, cauda equina syndrome    Plan: History and physical exam consistent with back strain. No red flag signs or emergent need for imaging at this time. No midline tenderness. Will discharge home with pain medication and muscle relaxants. Have stressed the importance of stretching and hot baths with Epsom salt. Have advised orthopedic follow-up. Patient agrees with the plan and management and states all questions have been thoroughly answered and there are no more remaining questions. MED RECONCILIATION:  No current facility-administered medications for this encounter. Current Outpatient Medications   Medication Sig    acetaminophen (TYLENOL) 325 mg tablet Take 2 Tabs by mouth every four (4) hours as needed for Pain.     ketorolac (TORADOL) 10 mg tablet Take 1 Tab by mouth every six (6) hours as needed for Pain for up to 5 days.  diazePAM (Valium) 5 mg tablet Take 1 Tab by mouth three (3) times daily as needed for Muscle Spasm(s) (spasm). Max Daily Amount: 15 mg.    albuterol (PROVENTIL HFA, VENTOLIN HFA, PROAIR HFA) 90 mcg/actuation inhaler Take 2 Puffs by inhalation every four (4) hours as needed for Wheezing.  albuterol (PROVENTIL HFA, VENTOLIN HFA, PROAIR HFA) 90 mcg/actuation inhaler Take 2 Puffs by inhalation every four (4) hours as needed for Wheezing.  escitalopram oxalate (LEXAPRO) 10 mg tablet Take 1 Tab by mouth daily. Indications: Anxiousness associated with Depression    vardenafil (LEVITRA) 20 mg tablet Take 20 mg by mouth.  naproxen sodium (ALEVE) 220 mg tablet Take 220 mg by mouth two (2) times daily (with meals).  nicotine buccal (POLACRILEX) 2 mg lozg lozenge Take 1 Lozenge by mouth every one (1) hour as needed for Smoking Cessation. Disposition:  Home     DISCHARGE NOTE:   Pt has been reexamined. Patient has no new complaints, changes, or physical findings. Care plan outlined and precautions discussed. Results of workup were reviewed with the patient. All medications were reviewed with the patient. All of pt's questions and concerns were addressed. Patient was instructed and agrees to follow up with PCP/ortho as well as to return to the ED upon further deterioration. Patient is ready to go home. Follow-up Information     Follow up With Specialties Details Why Contact Info    [facility] EMERGENCY DEPT Emergency Medicine  As needed [address] [place] [address] [id number] [facility]    Todd Smart MD Family Medicine Schedule an appointment as soon as possible for a visit   [address] [place] [address]  [phone]            Current Discharge Medication List      START taking these medications    Details   acetaminophen (TYLENOL) 325 mg tablet Take 2 Tabs by mouth every four (4) hours as needed for Pain.   Qty: 20 Tab, Refills: 0      ketorolac (TORADOL) 10 mg tablet Take 1 Tab by mouth every six (6) hours as needed for Pain for up to 5 days. Qty: 20 Tab, Refills: 0      diazePAM (Valium) 5 mg tablet Take 1 Tab by mouth three (3) times daily as needed for Muscle Spasm(s) (spasm). Max Daily Amount: 15 mg.  Qty: 20 Tab, Refills: 0    Associated Diagnoses: Back strain, initial encounter                 Diagnosis     Clinical Impression:   1. Back strain, initial encounter          \"Please note that this dictation was completed with [employer], the [employer] voice recognition software. Quite often unanticipated grammatical, syntax, homophones, and other interpretive errors are inadvertently transcribed by the computer software. Please disregard these errors. Please excuse any errors that have escaped final proofreading. \"

## 2021-02-18 NOTE — ED TRIAGE NOTES
Pt states he was moving heavy bags and feels as if he pulled something to his left lower back. Pt has history of back pain. No known injury.  Pt ambulatory into ED

## 2021-02-18 NOTE — Clinical Note
[address] [place] [address] Dr MEGHAN GARCIA BEH [facility] EMERGENCY DEPT 
[address] [facility] [id number] [phone] Work/School Note Date: 2/18/2021 To Whom It May concern: 
 
 
Cheli Charles was seen and treated today in the emergency room by the following provider(s): 
Attending Provider: Noe Matamoros DO Physician Assistant: STEVE Reyes. Cheli Charles is excused from work/school on 02/18/21. He is clear to return to work/school on 02/19/21. Sincerely, STEVE Cordero

## 2021-04-27 NOTE — BH NOTES
Progress Note - Cardiology   Jarocho Villanueva 68 y o  female MRN: [id number]  Unit/Bed#: ICU 08 Encounter: [id number]        Principal Problem:    Hypotension  Active Problems:    Hypothyroidism    History of pulmonary embolus (PE)    Compression fracture of T11 vertebra (HCC)    Acute on chronic diastolic (congestive) heart failure (HCC)    Small vessel vasculitis (HCC)    Stage 3 chronic kidney disease (HCC)    Closed T4 fracture (HCC)    Fall        Assessment/Plan        1  Septic shock suspected urinary source  Escalating Levophed 17 mcg, vasopressin  Goal MAP  greater than 65    + urine culture, negative BD to date  Managed by CCM    2  Acute on chronic RV failure  Diuretics held d/t hypotension-Bumex 2 mg b i d , Zaroxolyn  ProBNP 38, 524  CT without contrast of the spine-small bilateral pleural effusions and cardiomegaly  Exam-volume overloaded  Diuresis as tolerated, remains  Hypotensive     RV dysfunction-previously mild  RV markedly dilated reduced function  Additionally moderate moderate to severe and moderate to severe TR  IVC dilated  Trace pericardial effusion  Dr Lyly Underwood had reached out to our heart failure team yesterday  No additional treatment at that time  Was started on Milrinone overnight at 0 25 mcg    3  Elevated D-dimer  History of PE  Empirically placed on heparin  Unable to perform CTA due to ENEDELIA  Check echo- RV markedly dilated with reduced function with severe TR  PA pressure is 55 mm Hg prior  Spoke with critical care medicine about  checking V/Q scan  Unable to tolerate due to orthopnea      4  AK I/CKD III - creatinine 4 11  Baseline creatinine 1 6-1 7  ATN from sepsis/ hypotension, possible underlying vasculitis  Possible CVVHD later today     5  History of Hodgkin's lymphoma  Status post splenectomy, radiation  History of breast cancer 2006 status post mastectomy, Taxotere plus Cytoxan followed by adjuvant armidex  History melanoma status post resection     6   Falls- d/t Pt has been visible and active in the day area during this evening shift. He has attended groups and been sociable with peers. He visited with a female during visiting hours and it seemed to brighten him up. Pt received a lot of snacks and clothes. Denies current thoughts of self harm. He is able to contract for safety. He is awaiting placement at a rehab facility. Will continue to monitor for safety and support. weakness  CT spine T4 and T11 compression fracture  In setting of volume overload/sepsis/hypotension     7  Diffuse erythematous rash- vasculitis workup    Subjective/Objective   Chief Complaint/Subjective  Patient feels swollen  Feels tired and fatigued    Hypothermic        Vitals: BP (!) 93/44   Pulse 82   Temp (!) 94 5 °F (34 7 °C) (Bladder)   Resp (!) 30   Ht 5' 1 5" (1 562 m)   Wt 68 1 kg (150 lb 2 1 oz)   LMP  (LMP Unknown)   SpO2 100%   BMI 27 91 kg/m²     Vitals:    04/26/21 0548 04/27/21 0637   Weight: 66 5 kg (146 lb 9 7 oz) 68 1 kg (150 lb 2 1 oz)     Orthostatic Blood Pressures      Most Recent Value   Blood Pressure  (!) 93/44 filed at 04/27/2021 1301   Patient Position - Orthostatic VS  Sitting filed at 04/27/2021 0700            Intake/Output Summary (Last 24 hours) at 4/27/2021 1321  Last data filed at 4/27/2021 1243  Gross per 24 hour   Intake 1767 03 ml   Output 705 ml   Net 1062 03 ml       Invasive Devices     Central Venous Catheter Line            CVC Central Lines 04/26/21 Triple 20cm 1 day          Peripheral Intravenous Line            Peripheral IV 04/25/21 Right Antecubital 1 day          Arterial Line            Arterial Line 04/26/21 Femoral 1 day          Drain            Urethral Catheter Temperature probe 1 day                Current Facility-Administered Medications   Medication Dose Route Frequency    acetaminophen (TYLENOL) tablet 650 mg  650 mg Oral Q6H PRN    albumin human (FLEXBUMIN) 5 % injection 12 5 g  12 5 g Intravenous Once    albuterol (PROVENTIL HFA,VENTOLIN HFA) inhaler 2 puff  2 puff Inhalation Q6H PRN    ALPRAZolam (XANAX) tablet 0 25 mg  0 25 mg Oral BID PRN    aluminum-magnesium hydroxide-simethicone (MYLANTA) oral suspension 30 mL  30 mL Oral Q4H PRN    aspirin (ECOTRIN LOW STRENGTH) EC tablet 81 mg  81 mg Oral Daily    bumetanide (BUMEX) injection 2 mg  2 mg Intravenous Once    cefTRIAXone (ROCEPHIN) 2,000 mg in dextrose 5 % 50 mL IVPB  2,000 mg Intravenous Q24H    dextrose 50 % IV solution 50 mL  50 mL Intravenous Daily PRN    dextrose 50 % IV solution 50 mL  50 mL Intravenous Once    heparin (porcine) 25,000 units in 0 45% NaCl 250 mL infusion (premix)  3-30 Units/kg/hr (Order-Specific) Intravenous Titrated    heparin (porcine) injection 2,600 Units  2,600 Units Intravenous Q1H PRN    heparin (porcine) injection 5,200 Units  5,200 Units Intravenous Q1H PRN    levothyroxine tablet 100 mcg  100 mcg Oral Early Morning    lidocaine (LIDODERM) 5 % patch 1 patch  1 patch Topical Daily    milrinone (PRIMACOR) 20 mg in 100 mL infusion (premix)  0 25 mcg/kg/min Intravenous Continuous    norepinephrine (LEVOPHED) 8 mg (DOUBLE CONCENTRATION) IV in sodium chloride 0 9% 250 mL  1-30 mcg/min Intravenous Titrated    sodium bicarbonate tablet 650 mg  650 mg Oral TID after meals    vasopressin (PITRESSIN) 20 Units in sodium chloride 0 9 % 100 mL infusion  0 04 Units/min Intravenous Continuous         Physical Exam: BP (!) 93/44   Pulse 82   Temp (!) 94 5 °F (34 7 °C) (Bladder)   Resp (!) 30   Ht 5' 1 5" (1 562 m)   Wt 68 1 kg (150 lb 2 1 oz)   LMP  (LMP Unknown)   SpO2 100%   BMI 27 91 kg/m²     General Appearance:    Alert, cooperative, no distress, appears stated age   Head:    Normocephalic, no scleral icterus   Eyes:    PERRL   Nose:   Nares normal, septum midline, no drainage    Throat:   Lips, mucosa, and tongue normal   Neck:   Supple, symmetrical, trachea midline,       +JVD       Lungs:     Fine crackles  to auscultation bilaterally, respirations unlabored        Heart:    Regular rate and rhythm       Extremities:   Extremities normal, atraumatic, no cyanosis , generalized edema       Skin:   Skin diffuse red rash   Neurologic:   Alert and oriented to person place and time, no focal deficits                 Lab Results:   Recent Results (from the past 72 hour(s))   ECG 12 lead    Collection Time: 04/25/21  6:18 PM   Result Value Ref Range Ventricular Rate 84 BPM    Atrial Rate 84 BPM    SD Interval 134 ms    QRSD Interval 80 ms    QT Interval 340 ms    QTC Interval 401 ms    P Axis 83 degrees    QRS Axis 56 degrees    T Wave Axis 264 degrees   CBC and differential    Collection Time: 04/25/21  7:47 PM   Result Value Ref Range    WBC 10 18 (H) 4 31 - 10 16 Thousand/uL    RBC 3 01 (L) 3 81 - 5 12 Million/uL    Hemoglobin 8 4 (L) 11 5 - 15 4 g/dL    Hematocrit 26 0 (L) 34 8 - 46 1 %    MCV 86 82 - 98 fL    MCH 27 9 26 8 - 34 3 pg    MCHC 32 3 31 4 - 37 4 g/dL    RDW 16 8 (H) 11 6 - 15 1 %    MPV 9 9 8 9 - 12 7 fL    Platelets 956 (H) 581 - 390 Thousands/uL    nRBC 0 /100 WBCs    Neutrophils Relative 88 (H) 43 - 75 %    Immat GRANS % 1 0 - 2 %    Lymphocytes Relative 3 (L) 14 - 44 %    Monocytes Relative 6 4 - 12 %    Eosinophils Relative 2 0 - 6 %    Basophils Relative 0 0 - 1 %    Neutrophils Absolute 9 00 (H) 1 85 - 7 62 Thousands/µL    Immature Grans Absolute 0 08 0 00 - 0 20 Thousand/uL    Lymphocytes Absolute 0 33 (L) 0 60 - 4 47 Thousands/µL    Monocytes Absolute 0 58 0 17 - 1 22 Thousand/µL    Eosinophils Absolute 0 15 0 00 - 0 61 Thousand/µL    Basophils Absolute 0 04 0 00 - 0 10 Thousands/µL   Protime-INR    Collection Time: 04/25/21  7:47 PM   Result Value Ref Range    Protime 15 1 (H) 11 6 - 14 5 seconds    INR 1 17 0 84 - 1 19   APTT    Collection Time: 04/25/21  7:47 PM   Result Value Ref Range    PTT 33 23 - 37 seconds   Comprehensive metabolic panel    Collection Time: 04/25/21  7:47 PM   Result Value Ref Range    Sodium 132 (L) 136 - 145 mmol/L    Potassium 4 3 3 5 - 5 3 mmol/L    Chloride 97 (L) 100 - 108 mmol/L    CO2 26 21 - 32 mmol/L    ANION GAP 9 4 - 13 mmol/L    BUN 92 (H) 5 - 25 mg/dL    Creatinine 3 21 (H) 0 60 - 1 30 mg/dL    Glucose 133 65 - 140 mg/dL    Calcium 8 8 8 3 - 10 1 mg/dL    Corrected Calcium 10 6 (H) 8 3 - 10 1 mg/dL    AST 34 5 - 45 U/L    ALT 20 12 - 78 U/L    Alkaline Phosphatase 445 (H) 46 - 116 U/L    Total Protein 9 3 (H) 6 4 - 8 2 g/dL    Albumin 1 7 (L) 3 5 - 5 0 g/dL    Total Bilirubin 0 44 0 20 - 1 00 mg/dL    eGFR 14 ml/min/1 73sq m   Troponin I    Collection Time: 04/25/21  7:47 PM   Result Value Ref Range    Troponin I <0 02 <=0 04 ng/mL   NT-BNP PRO    Collection Time: 04/25/21  7:47 PM   Result Value Ref Range    NT-proBNP 38,524 (H) <125 pg/mL   D-Dimer    Collection Time: 04/25/21  7:47 PM   Result Value Ref Range    D-Dimer, Quant 9 20 (H) <0 50 ug/ml FEU   Novel Coronavirus (Covid-19),PCR [facility] - 2 Hour Stat    Collection Time: 04/25/21 10:13 PM    Specimen: Nose; Nares   Result Value Ref Range    SARS-CoV-2 Negative Negative   Urinalysis with microscopic    Collection Time: 04/26/21  1:27 AM   Result Value Ref Range    Clarity, UA Cloudy     Color, UA Yellow     Specific [place], UA 1 020 1 003 - 1 030    pH, UA 5 5 4 5, 5 0, 5 5, 6 0, 6 5, 7 0, 7 5, 8 0    Glucose, UA Negative Negative mg/dl    Ketones, UA Negative Negative mg/dl    Blood, UA Moderate (A) Negative    Protein, UA 30 (1+) (A) Negative mg/dl    Nitrite, UA Positive (A) Negative    Bilirubin, UA Negative Negative    Urobilinogen, UA 0 2 0 2, 1 0 E U /dl E U /dl    Leukocytes, UA Large (A) Negative    WBC, UA Innumerable (A) None Seen, 2-4 /hpf    RBC, UA 1-2 (A) None Seen, 2-4 /hpf    Bacteria, UA Moderate (A) None Seen, Occasional /hpf    Epithelial Cells None Seen None Seen, Occasional /hpf   Creatinine, urine, random    Collection Time: 04/26/21  1:27 AM   Result Value Ref Range    Creatinine, Ur 32 0 mg/dL   Urea nitrogen, urine    Collection Time: 04/26/21  1:27 AM   Result Value Ref Range    Urea Nitrogen, Ur 294 mg/dL   Osmolality, urine    Collection Time: 04/26/21  1:27 AM   Result Value Ref Range    Osmolality, Ur 346 250 - 900 mmol/KG   Urine culture    Collection Time: 04/26/21  1:27 AM    Specimen: Urine, Clean Catch   Result Value Ref Range    Urine Culture >100,000 cfu/ml Gram Negative Cal Enteric Like (A)    APTT Collection Time: 04/26/21  3:19 AM   Result Value Ref Range    PTT 32 23 - 37 seconds   Lactic acid, plasma    Collection Time: 04/26/21  3:19 AM   Result Value Ref Range    LACTIC ACID 2 0 0 5 - 2 0 mmol/L   Procalcitonin with AM Reflex    Collection Time: 04/26/21  3:19 AM   Result Value Ref Range    Procalcitonin 0 28 (H) <=0 25 ng/ml   Blood culture    Collection Time: 04/26/21  6:52 AM    Specimen: Arm, Right; Blood   Result Value Ref Range    Blood Culture No Growth at 24 hrs  Blood culture    Collection Time: 04/26/21 10:37 AM    Specimen: Line, Arterial; Blood   Result Value Ref Range    Blood Culture Received in Microbiology Lab  Culture in Progress      CBC and differential    Collection Time: 04/26/21 10:37 AM   Result Value Ref Range    WBC 11 70 (H) 4 31 - 10 16 Thousand/uL    RBC 3 03 (L) 3 81 - 5 12 Million/uL    Hemoglobin 8 4 (L) 11 5 - 15 4 g/dL    Hematocrit 26 1 (L) 34 8 - 46 1 %    MCV 86 82 - 98 fL    MCH 27 7 26 8 - 34 3 pg    MCHC 32 2 31 4 - 37 4 g/dL    RDW 16 6 (H) 11 6 - 15 1 %    MPV 9 7 8 9 - 12 7 fL    Platelets 959 (H) 048 - 390 Thousands/uL    nRBC 0 /100 WBCs    Neutrophils Relative 89 (H) 43 - 75 %    Immat GRANS % 1 0 - 2 %    Lymphocytes Relative 3 (L) 14 - 44 %    Monocytes Relative 7 4 - 12 %    Eosinophils Relative 0 0 - 6 %    Basophils Relative 0 0 - 1 %    Neutrophils Absolute 10 38 (H) 1 85 - 7 62 Thousands/µL    Immature Grans Absolute 0 09 0 00 - 0 20 Thousand/uL    Lymphocytes Absolute 0 36 (L) 0 60 - 4 47 Thousands/µL    Monocytes Absolute 0 83 0 17 - 1 22 Thousand/µL    Eosinophils Absolute 0 01 0 00 - 0 61 Thousand/µL    Basophils Absolute 0 03 0 00 - 0 10 Thousands/µL   Basic metabolic panel    Collection Time: 04/26/21 10:37 AM   Result Value Ref Range    Sodium 134 (L) 136 - 145 mmol/L    Potassium 5 2 3 5 - 5 3 mmol/L    Chloride 100 100 - 108 mmol/L    CO2 21 21 - 32 mmol/L    ANION GAP 13 4 - 13 mmol/L    BUN 90 (H) 5 - 25 mg/dL    Creatinine 3 29 (H) 0 60 - 1 30 mg/dL    Glucose 87 65 - 140 mg/dL    Calcium 8 4 8 3 - 10 1 mg/dL    eGFR 13 ml/min/1 73sq m   Magnesium    Collection Time: 04/26/21 10:37 AM   Result Value Ref Range    Magnesium 2 6 1 6 - 2 6 mg/dL   Phosphorus    Collection Time: 04/26/21 10:37 AM   Result Value Ref Range    Phosphorus 6 8 (H) 2 3 - 4 1 mg/dL   APTT    Collection Time: 04/26/21 10:37 AM   Result Value Ref Range    PTT 99 (H) 23 - 37 seconds   Protein electrophoresis, serum    Collection Time: 04/26/21 10:37 AM   Result Value Ref Range    A/G Ratio 0 39 (L) 1 10 - 1 80    Albumin Electrophoresis 28 2 (L) 52 0 - 65 0 %    Albumin CONC 2 28 (L) 3 50 - 5 00 g/dl    Alpha 1 7 3 (H) 2 5 - 5 0 %    ALPHA 1 CONC 0 59 (H) 0 10 - 0 40 g/dL    Alpha 2 10 1 7 0 - 13 0 %    ALPHA 2 CONC 0 82 0 40 - 1 20 g/dL    Beta-1 6 0 5 0 - 13 0 %    BETA 1 CONC 0 49 0 40 - 0 80 g/dL    Beta-2 7 1 2 0 - 8 0 %    BETA 2 CONC 0 58 (H) 0 20 - 0 50 g/dL    Gamma Globulin 41 3 (H) 12 0 - 22 0 %    GAMMA CONC 3 35 (H) 0 50 - 1 60 g/dL    Total Protein 8 1 6 4 - 8 2 g/dL    SPEP Interpretation See Comment    Immunofixation, Serum(Reflex Only-Do Not Order)    Collection Time: 04/26/21 10:37 AM   Result Value Ref Range    Immunofixation Interpretation See Comment    APTT    Collection Time: 04/26/21  6:04 PM   Result Value Ref Range    PTT 84 (H) 23 - 37 seconds   Basic metabolic panel    Collection Time: 04/26/21  7:41 PM   Result Value Ref Range    Sodium 133 (L) 136 - 145 mmol/L    Potassium 6 0 (H) 3 5 - 5 3 mmol/L    Chloride 100 100 - 108 mmol/L    CO2 18 (L) 21 - 32 mmol/L    ANION GAP 15 (H) 4 - 13 mmol/L    BUN 98 (H) 5 - 25 mg/dL    Creatinine 3 65 (H) 0 60 - 1 30 mg/dL    Glucose 42 (L) 65 - 140 mg/dL    Calcium 8 3 8 3 - 10 1 mg/dL    eGFR 12 ml/min/1 73sq m   Fingerstick Glucose (POCT)    Collection Time: 04/26/21  9:57 PM   Result Value Ref Range    POC Glucose 20 (LL) 65 - 140 mg/dl   Fingerstick Glucose (POCT)    Collection Time: 04/26/21 10:14 PM   Result Value Ref Range    POC Glucose 135 65 - 140 mg/dl   Fingerstick Glucose (POCT)    Collection Time: 04/26/21 10:38 PM   Result Value Ref Range    POC Glucose 153 (H) 65 - 140 mg/dl   APTT    Collection Time: 04/26/21 11:49 PM   Result Value Ref Range     (H) 23 - 37 seconds   Fingerstick Glucose (POCT)    Collection Time: 04/26/21 11:51 PM   Result Value Ref Range    POC Glucose >500 (HH) 65 - 140 mg/dl   Fingerstick Glucose (POCT)    Collection Time: 04/26/21 11:59 PM   Result Value Ref Range    POC Glucose 150 (H) 65 - 140 mg/dl   Fingerstick Glucose (POCT)    Collection Time: 04/27/21  1:02 AM   Result Value Ref Range    POC Glucose 111 65 - 140 mg/dl   Fingerstick Glucose (POCT)    Collection Time: 04/27/21  2:00 AM   Result Value Ref Range    POC Glucose 80 65 - 140 mg/dl   CBC and differential    Collection Time: 04/27/21  2:52 AM   Result Value Ref Range    WBC 11 69 (H) 4 31 - 10 16 Thousand/uL    RBC 2 95 (L) 3 81 - 5 12 Million/uL    Hemoglobin 8 2 (L) 11 5 - 15 4 g/dL    Hematocrit 26 1 (L) 34 8 - 46 1 %    MCV 89 82 - 98 fL    MCH 27 8 26 8 - 34 3 pg    MCHC 31 4 31 4 - 37 4 g/dL    RDW 17 1 (H) 11 6 - 15 1 %    MPV 10 0 8 9 - 12 7 fL    Platelets 608 (H) 800 - 390 Thousands/uL    nRBC 0 /100 WBCs    Neutrophils Relative 86 (H) 43 - 75 %    Immat GRANS % 2 0 - 2 %    Lymphocytes Relative 4 (L) 14 - 44 %    Monocytes Relative 8 4 - 12 %    Eosinophils Relative 0 0 - 6 %    Basophils Relative 0 0 - 1 %    Neutrophils Absolute 10 15 (H) 1 85 - 7 62 Thousands/µL    Immature Grans Absolute 0 17 0 00 - 0 20 Thousand/uL    Lymphocytes Absolute 0 41 (L) 0 60 - 4 47 Thousands/µL    Monocytes Absolute 0 92 0 17 - 1 22 Thousand/µL    Eosinophils Absolute 0 00 0 00 - 0 61 Thousand/µL    Basophils Absolute 0 04 0 00 - 0 10 Thousands/µL   Comprehensive metabolic panel    Collection Time: 04/27/21  2:52 AM   Result Value Ref Range    Sodium 136 136 - 145 mmol/L    Potassium 5 2 3 5 - 5 3 mmol/L    Chloride 99 (L) 100 - 108 mmol/L    CO2 19 (L) 21 - 32 mmol/L    ANION GAP 18 (H) 4 - 13 mmol/L    BUN 97 (H) 5 - 25 mg/dL    Creatinine 3 89 (H) 0 60 - 1 30 mg/dL    Glucose 89 65 - 140 mg/dL    Calcium 8 2 (L) 8 3 - 10 1 mg/dL    Corrected Calcium 10 0 8 3 - 10 1 mg/dL    AST 38 5 - 45 U/L    ALT 18 12 - 78 U/L    Alkaline Phosphatase 350 (H) 46 - 116 U/L    Total Protein 8 4 (H) 6 4 - 8 2 g/dL    Albumin 1 8 (L) 3 5 - 5 0 g/dL    Total Bilirubin 0 67 0 20 - 1 00 mg/dL    eGFR 11 ml/min/1 73sq m   Magnesium    Collection Time: 04/27/21  2:52 AM   Result Value Ref Range    Magnesium 2 7 (H) 1 6 - 2 6 mg/dL   Procalcitonin Reflex    Collection Time: 04/27/21  2:52 AM   Result Value Ref Range    Procalcitonin 3 19 (H) <=0 25 ng/ml   Fingerstick Glucose (POCT)    Collection Time: 04/27/21  2:55 AM   Result Value Ref Range    POC Glucose 100 65 - 140 mg/dl   Fingerstick Glucose (POCT)    Collection Time: 04/27/21  3:54 AM   Result Value Ref Range    POC Glucose 57 (L) 65 - 140 mg/dl   Fingerstick Glucose (POCT)    Collection Time: 04/27/21  4:07 AM   Result Value Ref Range    POC Glucose 245 (H) 65 - 140 mg/dl   Fingerstick Glucose (POCT)    Collection Time: 04/27/21  5:02 AM   Result Value Ref Range    POC Glucose 156 (H) 65 - 140 mg/dl   APTT    Collection Time: 04/27/21  5:17 AM   Result Value Ref Range     (HH) 23 - 37 seconds   Fingerstick Glucose (POCT)    Collection Time: 04/27/21  6:04 AM   Result Value Ref Range    POC Glucose 115 65 - 140 mg/dl   Fingerstick Glucose (POCT)    Collection Time: 04/27/21  7:50 AM   Result Value Ref Range    POC Glucose 81 65 - 140 mg/dl   CBC and differential    Collection Time: 04/27/21  8:19 AM   Result Value Ref Range    WBC 11 30 (H) 4 31 - 10 16 Thousand/uL    RBC 3 03 (L) 3 81 - 5 12 Million/uL    Hemoglobin 8 5 (L) 11 5 - 15 4 g/dL    Hematocrit 26 7 (L) 34 8 - 46 1 %    MCV 88 82 - 98 fL    MCH 28 1 26 8 - 34 3 pg    MCHC 31 8 31 4 - 37 4 g/dL    RDW 17 2 (H) 11 6 - 15 1 %    MPV 9 8 8 9 - 12 7 fL    Platelets 800 (H) 907 - 390 Thousands/uL    nRBC 0 /100 WBCs    Neutrophils Relative 87 (H) 43 - 75 %    Immat GRANS % 2 0 - 2 %    Lymphocytes Relative 4 (L) 14 - 44 %    Monocytes Relative 7 4 - 12 %    Eosinophils Relative 0 0 - 6 %    Basophils Relative 0 0 - 1 %    Neutrophils Absolute 9 74 (H) 1 85 - 7 62 Thousands/µL    Immature Grans Absolute 0 25 (H) 0 00 - 0 20 Thousand/uL    Lymphocytes Absolute 0 44 (L) 0 60 - 4 47 Thousands/µL    Monocytes Absolute 0 83 0 17 - 1 22 Thousand/µL    Eosinophils Absolute 0 00 0 00 - 0 61 Thousand/µL    Basophils Absolute 0 04 0 00 - 0 10 Thousands/µL   Comprehensive metabolic panel    Collection Time: 04/27/21  8:19 AM   Result Value Ref Range    Sodium 135 (L) 136 - 145 mmol/L    Potassium 5 9 (H) 3 5 - 5 3 mmol/L    Chloride 99 (L) 100 - 108 mmol/L    CO2 11 (L) 21 - 32 mmol/L    ANION GAP 25 (H) 4 - 13 mmol/L     (H) 5 - 25 mg/dL    Creatinine 3 98 (H) 0 60 - 1 30 mg/dL    Glucose 72 65 - 140 mg/dL    Calcium 8 4 8 3 - 10 1 mg/dL    Corrected Calcium 10 1 8 3 - 10 1 mg/dL    AST 41 5 - 45 U/L    ALT 15 12 - 78 U/L    Alkaline Phosphatase 364 (H) 46 - 116 U/L    Total Protein 8 7 (H) 6 4 - 8 2 g/dL    Albumin 1 9 (L) 3 5 - 5 0 g/dL    Total Bilirubin 0 87 0 20 - 1 00 mg/dL    eGFR 11 ml/min/1 73sq m   Phosphorus    Collection Time: 04/27/21  8:19 AM   Result Value Ref Range    Phosphorus 9 4 (H) 2 3 - 4 1 mg/dL   Protime-INR    Collection Time: 04/27/21  8:19 AM   Result Value Ref Range    Protime 23 2 (H) 11 6 - 14 5 seconds    INR 2 05 (H) 0 84 - 1 19   CK    Collection Time: 04/27/21  8:19 AM   Result Value Ref Range    Total CK 60 26 - 192 U/L   Lactic acid, plasma    Collection Time: 04/27/21  8:19 AM   Result Value Ref Range    LACTIC ACID 10 7 (HH) 0 5 - 2 0 mmol/L   Magnesium    Collection Time: 04/27/21  8:19 AM   Result Value Ref Range    Magnesium 2 8 (H) 1 6 - 2 6 mg/dL   TSH, 3rd generation with Free T4 reflex    Collection Time: 04/27/21  8:19 AM   Result Value Ref Range    TSH 3RD GENERATON 6 487 (H) 0 358 - 3 740 uIU/mL   Fingerstick Glucose (POCT)    Collection Time: 04/27/21  8:44 AM   Result Value Ref Range    POC Glucose 61 (L) 65 - 140 mg/dl   Fingerstick Glucose (POCT)    Collection Time: 04/27/21 10:08 AM   Result Value Ref Range    POC Glucose 138 65 - 140 mg/dl   Fingerstick Glucose (POCT)    Collection Time: 04/27/21 11:03 AM   Result Value Ref Range    POC Glucose 127 65 - 140 mg/dl   Lactic acid 2 Hours    Collection Time: 04/27/21 11:04 AM   Result Value Ref Range    LACTIC ACID 11 6 (HH) 0 5 - 2 0 mmol/L   Basic metabolic panel    Collection Time: 04/27/21 11:04 AM   Result Value Ref Range    Sodium 137 136 - 145 mmol/L    Potassium 5 7 (H) 3 5 - 5 3 mmol/L    Chloride 99 (L) 100 - 108 mmol/L    CO2 12 (L) 21 - 32 mmol/L    ANION GAP 26 (H) 4 - 13 mmol/L     (H) 5 - 25 mg/dL    Creatinine 4 11 (H) 0 60 - 1 30 mg/dL    Glucose 115 65 - 140 mg/dL    Calcium 8 1 (L) 8 3 - 10 1 mg/dL    eGFR 10 ml/min/1 73sq m   Uric acid    Collection Time: 04/27/21 11:04 AM   Result Value Ref Range    Uric Acid 14 0 (H) 2 0 - 6 8 mg/dL   Fingerstick Glucose (POCT)    Collection Time: 04/27/21 12:04 PM   Result Value Ref Range    POC Glucose 98 65 - 140 mg/dl   Fingerstick Glucose (POCT)    Collection Time: 04/27/21 12:11 PM   Result Value Ref Range    POC Glucose 92 65 - 140 mg/dl   Fingerstick Glucose (POCT)    Collection Time: 04/27/21 12:59 PM   Result Value Ref Range    POC Glucose 96 65 - 140 mg/dl     Imaging: I have personally reviewed pertinent reports  Tele- nsr      Counseling / Coordination of Care  Total time spent today 30  minutes  Greater than 50% of total time was spent with the patient and / or family counseling and / or coordination of care

## 2022-03-18 PROBLEM — I45.10 RBBB: Status: ACTIVE | Noted: 2017-02-13

## 2022-03-19 PROBLEM — F11.20 HEROIN ADDICTION (HCC): Status: ACTIVE | Noted: 2020-01-02

## 2022-03-19 PROBLEM — F32.A DEPRESSION: Status: ACTIVE | Noted: 2020-01-02

## 2022-06-28 ENCOUNTER — ANESTHESIA EVENT (OUTPATIENT)
Dept: ENDOSCOPY | Age: 62
End: 2022-06-28
Payer: COMMERCIAL

## 2022-06-28 RX ORDER — BUPRENORPHINE AND NALOXONE 8; 2 MG/1; MG/1
FILM, SOLUBLE BUCCAL; SUBLINGUAL 2 TIMES DAILY
COMMUNITY

## 2022-06-28 NOTE — PERIOP NOTES
PRE-SURGICAL INSTRUCTIONS        Patient's Name:  Fernanda Sosa      LCHJX'A Date:  6/28/2022            Covid Testing Date and Time:    Surgery Date:  6/29/2022                1. Do NOT eat or drink anything, including candy, gum, or ice chips after midnight on 6/28/2022, unless you have specific instructions from your surgeon or anesthesia provider to do so.  2. You may brush your teeth before coming to the hospital.  3. No smoking 24 hours prior to the day of surgery. 4. No alcohol 24 hours prior to the day of surgery. 5. No recreational drugs for one week prior to the day of surgery. 6. Leave all valuables, including money/purse, at home. 7. Remove all jewelry, nail polish, acrylic nails, and makeup (including mascara); no lotions powders, deodorant, or perfume/cologne/after shave on the skin. 8. Follow instruction for Hibiclens washes and CHG wipes from surgeon's office. 9. Glasses/contact lenses and dentures may be worn to the hospital.  They will be removed prior to surgery. 10. Call your doctor if symptoms of a cold or illness develop within 24-48 hours prior to your surgery. 11.  If you are having an outpatient procedure, please make arrangements for a responsible ADULT TO [address] [place] [address] and stay with you for 24 hours after your surgery. 12. ONE VISITOR in the hospital at this time for outpatient procedures. Exceptions may be made for surgical admissions, per nursing unit guidelines      Special Instructions:      Bring list of CURRENT medications. Bring inhaler. Bring CPAP machine. Bring any pertinent legal medical records. Take these medications the morning of surgery with a sip of water: Suboxone and per MD  Follow physician instructions about insulin. Follow physician instructions about stopping anticoagulants. Complete bowel prep per MD instructions. On the day of surgery, come in the main entrance of [facility].   Let the  at the desk know you are there for surgery. A staff member will come escort you to the surgical area on the second floor. If you have any questions or concerns, please do not hesitate to call:     (Prior to the day of surgery) [facility] department:  [phone]   (Day of surgery) Pre-Op department:  [phone]    These surgical instructions were reviewed with patient during the PAT phone call.

## 2022-06-29 ENCOUNTER — ANESTHESIA (OUTPATIENT)
Dept: ENDOSCOPY | Age: 62
End: 2022-06-29
Payer: COMMERCIAL

## 2022-06-29 ENCOUNTER — HOSPITAL ENCOUNTER (OUTPATIENT)
Age: 62
Setting detail: OUTPATIENT SURGERY
Discharge: HOME | End: 2022-06-29
Attending: INTERNAL MEDICINE | Admitting: INTERNAL MEDICINE
Payer: COMMERCIAL

## 2022-06-29 VITALS
TEMPERATURE: 98.2 F | DIASTOLIC BLOOD PRESSURE: 79 MMHG | BODY MASS INDEX: 24.44 KG/M2 | SYSTOLIC BLOOD PRESSURE: 139 MMHG | HEART RATE: 38 BPM | HEIGHT: 69 IN | RESPIRATION RATE: 20 BRPM | OXYGEN SATURATION: 99 % | WEIGHT: 165 LBS

## 2022-06-29 LAB
AMPHET UR QL SCN: NEGATIVE
BARBITURATES UR QL SCN: NEGATIVE
BENZODIAZ UR QL: NEGATIVE
CANNABINOIDS UR QL SCN: NEGATIVE
COCAINE UR QL SCN: POSITIVE
HDSCOM,HDSCOM: ABNORMAL
METHADONE UR QL: NEGATIVE
OPIATES UR QL: NEGATIVE
PCP UR QL: NEGATIVE

## 2022-06-29 PROCEDURE — 80307 DRUG TEST PRSMV CHEM ANLYZR: CPT

## 2022-06-29 RX ORDER — FAMOTIDINE 20 MG/1
20 TABLET, FILM COATED ORAL ONCE
Status: DISCONTINUED | OUTPATIENT
Start: 2022-06-29 | End: 2022-06-29 | Stop reason: HOSPADM

## 2022-06-29 RX ORDER — SODIUM CHLORIDE, SODIUM LACTATE, POTASSIUM CHLORIDE, CALCIUM CHLORIDE 600; 310; 30; 20 MG/100ML; MG/100ML; MG/100ML; MG/100ML
50 INJECTION, SOLUTION INTRAVENOUS CONTINUOUS
Status: DISCONTINUED | OUTPATIENT
Start: 2022-06-29 | End: 2022-06-29 | Stop reason: HOSPADM

## 2022-06-29 RX ORDER — LIDOCAINE HYDROCHLORIDE 10 MG/ML
0.1 INJECTION, SOLUTION EPIDURAL; INFILTRATION; INTRACAUDAL; PERINEURAL AS NEEDED
Status: DISCONTINUED | OUTPATIENT
Start: 2022-06-29 | End: 2022-06-29 | Stop reason: HOSPADM

## 2022-06-29 NOTE — OP NOTES
Patient came in to pre, alert and oriented x3. Vital sign taken /79 [address] 38. Respiration 20. No sign of distress. No complaint noted. Repeated Pulse rate  43 . Dr. Josee Pacheco made aware , no order  treatment given. Patient positive to Cocaine . MD made aware . Dr. Josee Pacheco in to talk to patient ,  procedure   canceledl. patient  Understood.

## 2022-06-29 NOTE — ANESTHESIA PREPROCEDURE EVALUATION
Relevant Problems   NEUROLOGY   (+) Depression      CARDIOVASCULAR   (+) RBBB       Anesthesia Evaluation         Anesthesia Plan

## 2022-06-29 NOTE — H&P
[email or web address]  [phone]      Impression:   1. Average risk colon cancer screening exam      Plan:     1. Colonoscopy      Addendum: All lab tests orders pertaining to the procedure have been ordered by the anesthesia personnel and results will be addressed by the anesthesia team    Chief Complaint: Average risk colon cancer screening exam.      HPI:  Norah Prater is a 64 y.o. male who is being seen on consult for average risk colon cancer screening with colonoscopy    PMH:   Past Medical History:   Diagnosis Date    Acid indigestion     Anemia NEC     Back pain     Cardiomegaly     1/12 LVH on EKG    Chest pain, unspecified     Drug use     Heroin causing adverse effect in therapeutic use     Irregular heart beat     Nonspecific abnormal electrocardiogram (ECG) (EKG)     S Ronak, RBBB    Pulmonary nodule     Tachycardia     Tightness in chest     Tiredness     Tobacco use disorder        PSH:   Past Surgical History:   Procedure Laterality Date    HX CIRCUMCISION      HX CIRCUMCISION      HX OTHER SURGICAL      HX TORSION OF THE TESTES    HX OTHER SURGICAL  2007 approx    lung biopsy       Social HX:   Social History     Socioeconomic History    Marital status:      Spouse name: Not on file    Number of children: Not on file    Years of education: Not on file    Highest education level: Not on file   Occupational History    Not on file   Tobacco Use    Smoking status: Current Every Day Smoker     Packs/day: 1.00     Years: 34.00     Pack years: 34.00     Types: Cigarettes    Smokeless tobacco: Never Used   Vaping Use    Vaping Use: Never used   Substance and Sexual Activity    Alcohol use:  Yes     Alcohol/week: 0.8 standard drinks     Types: 1 Cans of beer per week     Comment: occasional    Drug use: Yes     Types: Marijuana, Heroin, Cocaine     Comment: denies heroin at this time, endorses some marijuan and cocaine    Sexual activity: Not on file   Other Topics Concern    Not on file   Social History Narrative    Not on file     Social Determinants of Health     Financial Resource Strain:     Difficulty of Paying Living Expenses: Not on file   Food Insecurity:     Worried About Running Out of Food in the Last Year: Not on file    Sonia of Food in the Last Year: Not on file   Transportation Needs:     Lack of Transportation (Medical): Not on file    Lack of Transportation (Non-Medical): Not on file   Physical Activity:     Days of Exercise per Week: Not on file    Minutes of Exercise per Session: Not on file   Stress:     Feeling of Stress : Not on file   Social Connections:     Frequency of Communication with Friends and Family: Not on file    Frequency of Social Gatherings with Friends and Family: Not on file    Attends Religion Services: Not on file    Active Member of [address] [place] [address] [employer] or Organizations: Not on file    Attends Club or Organization Meetings: Not on file    Marital Status: Not on file   Intimate Partner Violence:     Fear of Current or Ex-Partner: Not on file    Emotionally Abused: Not on file    Physically Abused: Not on file    Sexually Abused: Not on file   Housing Stability:     Unable to Pay for Housing in the Last Year: Not on file    Number of [place] in the Last Year: Not on file    Unstable Housing in the Last Year: Not on file       FHX:   Family History   Problem Relation Age of Onset    Diabetes Mother     Hypertension Mother     Thyroid Disease Mother     High Cholesterol Mother     Cancer Father     Heart Disease Father     Hypertension Father     Diabetes Sister     Hypertension Sister     Hypertension Brother     Hypertension Sister     High Cholesterol Sister        Allergy:   No Known Allergies    Home Medications:     Medications Prior to Admission   Medication Sig    buprenorphine-naloxone (Suboxone) 8-2 mg film sublingaul film by SubLINGual route two (2) times a day.     acetaminophen (TYLENOL) 325 mg tablet Take 2 Tabs by mouth every four (4) hours as needed for Pain.  diazePAM (Valium) 5 mg tablet Take 1 Tab by mouth three (3) times daily as needed for Muscle Spasm(s) (spasm). Max Daily Amount: 15 mg. (Patient not taking: Reported on 6/28/2022)    albuterol (PROVENTIL HFA, VENTOLIN HFA, PROAIR HFA) 90 mcg/actuation inhaler Take 2 Puffs by inhalation every four (4) hours as needed for Wheezing. (Patient not taking: Reported on 6/28/2022)    albuterol (PROVENTIL HFA, VENTOLIN HFA, PROAIR HFA) 90 mcg/actuation inhaler Take 2 Puffs by inhalation every four (4) hours as needed for Wheezing.  escitalopram oxalate (LEXAPRO) 10 mg tablet Take 1 Tab by mouth daily. Indications: Anxiousness associated with Depression    vardenafil (LEVITRA) 20 mg tablet Take 20 mg by mouth.  naproxen sodium (ALEVE) 220 mg tablet Take 220 mg by mouth two (2) times daily (with meals).  nicotine buccal (POLACRILEX) 2 mg lozg lozenge Take 1 Lozenge by mouth every one (1) hour as needed for Smoking Cessation. Review of Systems:     Constitutional: No fevers, chills, weight loss, fatigue. Skin: No rashes, pruritis, jaundice, ulcerations, erythema. HENT: No headaches, nosebleeds, sinus pressure, rhinorrhea, sore throat. Eyes: No visual changes, blurred vision, eye pain, photophobia, jaundice. Cardiovascular: No chest pain, heart palpitations. Respiratory: No cough, SOB, wheezing, chest discomfort, orthopnea. Gastrointestinal: Neg unless noted otherwise in H&P   Genitourinary: No dysuria, bleeding, discharge, pyuria. Musculoskeletal: No weakness, arthralgias, wasting. Endo: No sweats. Heme: No bruising, easy bleeding. Allergies: As noted. Neurological: Cranial nerves intact. Alert and oriented. Gait not assessed. Psychiatric:  No anxiety, depression, hallucinations.                  Visit Vitals  Ht 5' 9\" (1.753 m)   Wt 74.8 kg (165 lb)   BMI 24.37 kg/m²       Physical Assessment:     constitutional: appearance: well developed, well nourished, normal habitus, no deformities, in no acute distress. skin: inspection: no rashes, ulcers, icterus or other lesions; no clubbing or telangiectasias. palpation: no induration or subcutaneos nodules. eyes: inspection: normal conjunctivae and lids; no jaundice pupils: symmetrical, normoreactive to light, normal accommodation and size. ENMT: mouth: normal oral mucosa,lips and gums; good dentition. oropharynx: normal tongue, hard and soft palate; posterior pharynx without erithema, exudate or lesions. neck: thyroid: normal size, consistency and position; no masses or tenderness. respiratory: effort: normal chest excursion; no intercostal retraction or accessory muscle use. cardiovascular: abdominal aorta: normal size and position; no bruits. palpation: PMI of normal size and position; normal rhythm; no thrill or murmurs. abdominal: abdomen: normal consistency; no tenderness or masses. hernias: no hernias appreciated. liver: normal size and consistency. spleen: not palpable. rectal: hemoccult/guaiac: not performed. musculoskeletal: digits and nails: no clubbing, cyanosis, petechiae or other inflammatory conditions. gait: normal gait and station head and neck: normal range of motion; no pain, crepitation or contracture. spine/ribs/pelvis: normal range of motion; no pain, deformity or contracture. lymphatic: axilae: not palpable. groin: not palpable. neck: within normal limits. other: not palpable. neurologic: cranial nerves: II-XII normal.   psychiatric: judgement/insight: within normal limits. memory: within normal limits for recent and remote events. mood and affect: no evidence of depression, anxiety or agitation. orientation: oriented to time, space and person. Basic Metabolic Profile   No results for input(s): NA, K, CL, CO2, BUN, GLU, CA, MG, PHOS in the last 72 hours.     No lab exists for component: CREAT      CBC w/Diff    No results for input(s): WBC, RBC, HGB, HCT, MCV, MCH, MCHC, RDW, PLT, HGBEXT, HCTEXT, PLTEXT in the last 72 hours. No lab exists for component: MPV No results for input(s): GRANS, LYMPH, EOS, PRO, MYELO, METAS, BLAST in the last 72 hours. No lab exists for component: MONO, BASO     Hepatic Function   No results for input(s): ALB, TP, TBILI, AP, AML, LPSE in the last 72 hours. No lab exists for component: DBILI, GPT, SGOT       Vinod Jefferson MD, M.D. [facility], [facility]  [email or web address]

## 2022-07-12 LAB — COLOGUARD TEST, EXTERNAL: POSITIVE

## 2023-09-22 ENCOUNTER — OFFICE VISIT (OUTPATIENT)
Age: 63
End: 2023-09-22

## 2023-09-22 VITALS
DIASTOLIC BLOOD PRESSURE: 80 MMHG | HEART RATE: 41 BPM | OXYGEN SATURATION: 97 % | WEIGHT: 170 LBS | BODY MASS INDEX: 25.1 KG/M2 | SYSTOLIC BLOOD PRESSURE: 144 MMHG

## 2023-09-22 DIAGNOSIS — R06.02 SOB (SHORTNESS OF BREATH): Primary | ICD-10-CM

## 2023-09-22 DIAGNOSIS — R00.1 BRADYCARDIA: ICD-10-CM

## 2023-09-22 RX ORDER — NALOXONE HYDROCHLORIDE 4 MG/.1ML
SPRAY NASAL
COMMUNITY
Start: 2023-08-15

## 2023-09-22 RX ORDER — INDOMETHACIN 50 MG/1
CAPSULE ORAL
COMMUNITY
Start: 2023-09-03 | End: 2023-09-22 | Stop reason: CLARIF

## 2023-09-22 NOTE — PATIENT INSTRUCTIONS
Testing   Echo/ Treadmill Stress    **call office 3-5 days after testing is completed for results** Please ensure testing is completed prior to scheduled follow up appointment. If it is not completed your appointment may be rescheduled**      Other Testing  [employer]-will be calling you to confirm your address to send your Heart Monitor. Please allow 7-10 business days for monitor to arrive at your home.   Customer Service for [address] [place] [address] Avenue:  [phone]

## 2023-09-22 NOTE — PROGRESS NOTES
again in 3 weeks    Tobacco abuse disorder: Unfortunately patient smokes  Patient is currently smoking 1 pack/day. He has been smoking for 2 or 3 decades  Patient educated for consequences/sequela and risk of smoking including but not limited to CAD/PAD/stroke/COPD/lung cancer/other cancer and death. Patient understood completely and expressed and verbalized understanding. Today I had a lengthy discussion with the patient for more than 5 minutes discussing about importance of smoking cessation. Counseling was offered. Patient is working towards that goal.  Medications options were discussed and patient will think about it    Substance abuse: Patient has a history of heroin use. He is not doing that anymore however he does cocaine 2 or 3 times a week which is less than before. Have advised patient to avoid any substance abuse    This plan was discussed with patient who is in agreement. Thank you for allowing me to participate in patient care. Please feel free to call me if you have any question or concern. Elisha Malloy MD  Please note: This document has been produced using voice recognition software. Unrecognized errors in transcription may be present.

## 2023-09-30 SDOH — HEALTH STABILITY: PHYSICAL HEALTH: ON AVERAGE, HOW MANY DAYS PER WEEK DO YOU ENGAGE IN MODERATE TO STRENUOUS EXERCISE (LIKE A BRISK WALK)?: 6 DAYS

## 2023-10-02 ENCOUNTER — OFFICE VISIT (OUTPATIENT)
Facility: CLINIC | Age: 63
End: 2023-10-02
Payer: MEDICAID

## 2023-10-02 ENCOUNTER — TELEPHONE (OUTPATIENT)
Age: 63
End: 2023-10-02

## 2023-10-02 VITALS
OXYGEN SATURATION: 98 % | SYSTOLIC BLOOD PRESSURE: 146 MMHG | WEIGHT: 170 LBS | HEIGHT: 70 IN | HEART RATE: 59 BPM | DIASTOLIC BLOOD PRESSURE: 84 MMHG | TEMPERATURE: 97.8 F | BODY MASS INDEX: 24.34 KG/M2

## 2023-10-02 DIAGNOSIS — R53.83 OTHER FATIGUE: ICD-10-CM

## 2023-10-02 DIAGNOSIS — Z13.220 SCREENING FOR LIPID DISORDERS: ICD-10-CM

## 2023-10-02 DIAGNOSIS — N52.9 ERECTILE DYSFUNCTION, UNSPECIFIED ERECTILE DYSFUNCTION TYPE: ICD-10-CM

## 2023-10-02 DIAGNOSIS — F19.90 POLYSUBSTANCE USE DISORDER: Primary | ICD-10-CM

## 2023-10-02 DIAGNOSIS — Z90.79 HISTORY OF ORCHIECTOMY: ICD-10-CM

## 2023-10-02 DIAGNOSIS — R06.02 SHORTNESS OF BREATH: ICD-10-CM

## 2023-10-02 DIAGNOSIS — I10 ESSENTIAL HYPERTENSION: ICD-10-CM

## 2023-10-02 DIAGNOSIS — J92.0 PLEURAL PLAQUE WITH PRESENCE OF ASBESTOS: ICD-10-CM

## 2023-10-02 PROCEDURE — 99204 OFFICE O/P NEW MOD 45 MIN: CPT | Performed by: STUDENT IN AN ORGANIZED HEALTH CARE EDUCATION/TRAINING PROGRAM

## 2023-10-02 PROCEDURE — 3074F SYST BP LT 130 MM HG: CPT | Performed by: STUDENT IN AN ORGANIZED HEALTH CARE EDUCATION/TRAINING PROGRAM

## 2023-10-02 PROCEDURE — 3078F DIAST BP <80 MM HG: CPT | Performed by: STUDENT IN AN ORGANIZED HEALTH CARE EDUCATION/TRAINING PROGRAM

## 2023-10-02 RX ORDER — AMLODIPINE BESYLATE 5 MG/1
5 TABLET ORAL DAILY
Qty: 90 TABLET | Refills: 1 | Status: SHIPPED | OUTPATIENT
Start: 2023-10-02

## 2023-10-02 RX ORDER — TADALAFIL 10 MG/1
TABLET ORAL
Qty: 30 TABLET | Refills: 3 | Status: SHIPPED | OUTPATIENT
Start: 2023-10-02

## 2023-10-02 SDOH — ECONOMIC STABILITY: HOUSING INSECURITY
IN THE LAST 12 MONTHS, WAS THERE A TIME WHEN YOU DID NOT HAVE A STEADY PLACE TO SLEEP OR SLEPT IN A SHELTER (INCLUDING NOW)?: NO

## 2023-10-02 SDOH — ECONOMIC STABILITY: INCOME INSECURITY: HOW HARD IS IT FOR YOU TO PAY FOR THE VERY BASICS LIKE FOOD, HOUSING, MEDICAL CARE, AND HEATING?: SOMEWHAT HARD

## 2023-10-02 SDOH — ECONOMIC STABILITY: FOOD INSECURITY: WITHIN THE PAST 12 MONTHS, YOU WORRIED THAT YOUR FOOD WOULD RUN OUT BEFORE YOU GOT MONEY TO BUY MORE.: NEVER TRUE

## 2023-10-02 SDOH — ECONOMIC STABILITY: FOOD INSECURITY: WITHIN THE PAST 12 MONTHS, THE FOOD YOU BOUGHT JUST DIDN'T LAST AND YOU DIDN'T HAVE MONEY TO GET MORE.: SOMETIMES TRUE

## 2023-10-02 ASSESSMENT — PATIENT HEALTH QUESTIONNAIRE - PHQ9
5. POOR APPETITE OR OVEREATING: 1
8. MOVING OR SPEAKING SO SLOWLY THAT OTHER PEOPLE COULD HAVE NOTICED. OR THE OPPOSITE, BEING SO FIGETY OR RESTLESS THAT YOU HAVE BEEN MOVING AROUND A LOT MORE THAN USUAL: 0
SUM OF ALL RESPONSES TO PHQ QUESTIONS 1-9: 7
9. THOUGHTS THAT YOU WOULD BE BETTER OFF DEAD, OR OF HURTING YOURSELF: 0
10. IF YOU CHECKED OFF ANY PROBLEMS, HOW DIFFICULT HAVE THESE PROBLEMS MADE IT FOR YOU TO DO YOUR WORK, TAKE CARE OF THINGS AT HOME, OR GET ALONG WITH OTHER PEOPLE: 0
SUM OF ALL RESPONSES TO PHQ QUESTIONS 1-9: 7
1. LITTLE INTEREST OR PLEASURE IN DOING THINGS: 1
4. FEELING TIRED OR HAVING LITTLE ENERGY: 1
7. TROUBLE CONCENTRATING ON THINGS, SUCH AS READING THE NEWSPAPER OR WATCHING TELEVISION: 0
SUM OF ALL RESPONSES TO PHQ9 QUESTIONS 1 & 2: 2
SUM OF ALL RESPONSES TO PHQ QUESTIONS 1-9: 7
SUM OF ALL RESPONSES TO PHQ QUESTIONS 1-9: 7
6. FEELING BAD ABOUT YOURSELF - OR THAT YOU ARE A FAILURE OR HAVE LET YOURSELF OR YOUR FAMILY DOWN: 0
2. FEELING DOWN, DEPRESSED OR HOPELESS: 1
3. TROUBLE FALLING OR STAYING ASLEEP: 3

## 2023-10-02 NOTE — TELEPHONE ENCOUNTER
Spoke to patient. Patient was asleep or awake during bradycardia episodes. No cardiac symptoms reported other than feeling fatigued. He currently only takes Suboxone. Will review with provider.

## 2023-10-02 NOTE — TELEPHONE ENCOUNTER
----- Message from July Thomas MD sent at 9/26/2023  8:19 AM EDT -----  Regarding: RE: Antonio Simmering event  Thank you    Nicole Fearing: Can you please print out that event log and show me or seutter     Thanks    ----- Message -----  From: Rohit Hankins PA-C  Sent: 9/26/2023   8:03 AM EDT  To: July Thomas MD  Subject: [employer] event                                     Good morning, Just FYI, I was paged this AM about HR 28 at 04:55 (auto-trigger). Looks like HR was 41 in office.   Thanks,    [employer]

## 2023-10-04 ENCOUNTER — TELEPHONE (OUTPATIENT)
Age: 63
End: 2023-10-04

## 2023-10-04 NOTE — TELEPHONE ENCOUNTER
Pt had auto triggered 10/04 917am, severe zachary with underline sinus zachary 30 beats per minute, 2.3 second pause.  Notifying for severe zachary

## 2023-10-06 NOTE — TELEPHONE ENCOUNTER
Spoke with pts wife about his event monitor results. I asked if she could let him know we called regarding the findings and wanted to know if he had experienced any symptoms that he could identify. His wife said she would give him the message. I did try calling his cell phone with no answer.

## 2023-10-10 ENCOUNTER — HOSPITAL ENCOUNTER (OUTPATIENT)
Facility: HOSPITAL | Age: 63
Discharge: HOME | End: 2023-10-13

## 2023-10-10 LAB — SENTARA SPECIMEN COLLECTION: NORMAL

## 2023-10-12 LAB
A/G RATIO: 1.2 RATIO (ref 1.1–2.6)
ALBUMIN SERPL-MCNC: 4 G/DL (ref 3.5–5)
ALP BLD-CCNC: 84 U/L (ref 40–125)
ALT SERPL-CCNC: 17 U/L (ref 5–40)
ANION GAP SERPL CALCULATED.3IONS-SCNC: 10 MMOL/L (ref 3–15)
AST SERPL-CCNC: 23 U/L (ref 10–37)
BILIRUB SERPL-MCNC: 0.4 MG/DL (ref 0.2–1.2)
BUN BLDV-MCNC: 16 MG/DL (ref 6–22)
CALCIUM SERPL-MCNC: 9.6 MG/DL (ref 8.4–10.5)
CHLORIDE BLD-SCNC: 105 MMOL/L (ref 98–110)
CHOLESTEROL/HDL RATIO: 2.3 (ref 0–5)
CHOLESTEROL: 135 MG/DL (ref 110–200)
CO2: 27 MMOL/L (ref 20–32)
CREAT SERPL-MCNC: 1 MG/DL (ref 0.8–1.6)
GLOBULIN: 3.4 G/DL (ref 2–4)
GLOMERULAR FILTRATION RATE: >60 ML/MIN/1.73 SQ.M.
GLUCOSE: 114 MG/DL (ref 70–99)
HCT VFR BLD CALC: 41.5 % (ref 39.3–51.6)
HDLC SERPL-MCNC: 60 MG/DL
HEMOGLOBIN: 12.4 G/DL (ref 13.1–17.2)
HEPATITIS C ANTIBODY: NORMAL
HIV -1/0/2 AG/AB WITH REFLEX: NON REACTIVE
HIV INTERPRETATION: NORMAL
LDL CHOLESTEROL CALCULATED: 60 MG/DL (ref 50–99)
LDL/HDL RATIO: 1
MCH RBC QN AUTO: 26 PG (ref 26–34)
MCHC RBC AUTO-ENTMCNC: 30 G/DL (ref 31–36)
MCV RBC AUTO: 88 FL (ref 80–95)
NON-HDL CHOLESTEROL: 75 MG/DL
PDW BLD-RTO: 14.5 % (ref 10–15.5)
PLATELET # BLD: 164 K/UL (ref 140–440)
PMV BLD AUTO: 14.1 FL (ref 9–13)
POTASSIUM SERPL-SCNC: 4.4 MMOL/L (ref 3.5–5.5)
RBC: 4.72 M/UL (ref 3.8–5.8)
SEX HORMONE BINDING GLOBULIN: 64 NMOL/L (ref 22–77)
SODIUM BLD-SCNC: 142 MMOL/L (ref 133–145)
T4 FREE: 1.2 NG/DL (ref 0.9–1.8)
TESTOSTERONE TOTAL: 326 NG/DL (ref 348–1197)
TOTAL PROTEIN: 7.4 G/DL (ref 6.2–8.1)
TRIGL SERPL-MCNC: 73 MG/DL (ref 40–149)
TSH SERPL DL<=0.05 MIU/L-ACNC: 1.12 MCU/ML (ref 0.27–4.2)
VLDLC SERPL CALC-MCNC: 15 MG/DL (ref 8–30)
WBC: 5.4 K/UL (ref 4–11)

## 2023-10-19 ENCOUNTER — TELEPHONE (OUTPATIENT)
Facility: CLINIC | Age: 63
End: 2023-10-19

## 2023-10-19 NOTE — TELEPHONE ENCOUNTER
----- Message from Godfrey Shen sent at 10/8/2023  1:44 PM EDT -----  Regarding: Black mucus  Contact: [phone]  Hi it's Adin's Lei Tarango. He coughed up some black mucus at around 7:30-8:00 am this morning (he's just telling me). Should we go to the ER now or wait to hear from you? [Consult Letter:] : I had the pleasure of evaluating your patient, [unfilled]. [Please see my note below.] : Please see my note below. [Consult Closing:] : Thank you very much for allowing me to participate in the care of this patient.  If you have any questions, please do not hesitate to contact me. [Sincerely,] : Sincerely, [FreeTextEntry3] : Yariel Bradley MD

## 2023-10-24 ENCOUNTER — TELEPHONE (OUTPATIENT)
Facility: CLINIC | Age: 63
End: 2023-10-24

## 2023-10-24 NOTE — TELEPHONE ENCOUNTER
Pt called to discuss lab results but Pt states he is still having shortness of breath and hip pain. Fallon Calhoun Please review and advise.

## 2023-10-24 NOTE — TELEPHONE ENCOUNTER
----- Message from Mayte Garcia sent at 10/20/2023  4:35 PM EDT -----  Subject: Results Request    QUESTIONS  Results: Lipid Panel; Ordered by:   Date Performed: 2023-10-10  ---------------------------------------------------------------------------  --------------  German HOLT    [phone]; OK to leave message on voicemail  ---------------------------------------------------------------------------  --------------

## 2023-11-06 ENCOUNTER — TELEPHONE (OUTPATIENT)
Facility: CLINIC | Age: 63
End: 2023-11-06

## 2023-11-06 NOTE — TELEPHONE ENCOUNTER
----- Message from Severa Manifold, MD sent at 10/31/2023  4:53 PM EDT -----  Your electrolytes, kidney function, liver function, cholesterol, thyroid function are within good ranges. Your testosterone and hemoglobin was mildly low. I would like to talk about potential treatment options for this during the next visit. Screening for hepatitis C and HIV was negative.

## 2023-11-06 NOTE — TELEPHONE ENCOUNTER
Discuss lab results with Pt's spouse. Pt has been scheduled for the 16th @2:15 to discuss lab results and regimen with Dr. Shahana Benavides.

## 2023-11-06 NOTE — TELEPHONE ENCOUNTER
----- Message from Pepper Escobar MD sent at 10/31/2023  4:53 PM EDT -----  Your electrolytes, kidney function, liver function, cholesterol, thyroid function are within good ranges. Your testosterone and hemoglobin was mildly low. I would like to talk about potential treatment options for this during the next visit. Screening for hepatitis C and HIV was negative.

## 2023-11-28 ENCOUNTER — TELEPHONE (OUTPATIENT)
Age: 63
End: 2023-11-28

## 2023-11-28 NOTE — TELEPHONE ENCOUNTER
----- Message from Connie Hodgkins, MD sent at 11/16/2023 12:50 PM EST -----  Please inform patient regarding test finding  Appears normal.    Thanks  SP

## 2024-01-19 PROBLEM — F11.20 HEROIN ADDICTION (HCC): Status: RESOLVED | Noted: 2020-01-02 | Resolved: 2024-01-19

## 2024-01-19 RX ORDER — IBUPROFEN 600 MG/1
600 TABLET ORAL EVERY 6 HOURS PRN
COMMUNITY
Start: 2021-07-01

## 2024-01-19 RX ORDER — INDOMETHACIN 50 MG/1
SUPPOSITORY RECTAL
COMMUNITY
Start: 2023-08-21

## 2024-01-19 RX ORDER — LIDOCAINE 50 MG/G
PATCH TOPICAL
COMMUNITY
Start: 2021-07-01

## 2024-02-22 ENCOUNTER — OFFICE VISIT (OUTPATIENT)
Age: 64
End: 2024-02-22

## 2024-02-22 VITALS
HEART RATE: 44 BPM | RESPIRATION RATE: 20 BRPM | HEIGHT: 69 IN | BODY MASS INDEX: 25.77 KG/M2 | OXYGEN SATURATION: 98 % | WEIGHT: 174 LBS | TEMPERATURE: 98.3 F

## 2024-02-22 DIAGNOSIS — J44.9 COPD, GROUP B, BY GOLD 2017 CLASSIFICATION (HCC): ICD-10-CM

## 2024-02-22 DIAGNOSIS — J92.0 PLEURAL PLAQUE DUE TO ASBESTOS EXPOSURE: ICD-10-CM

## 2024-02-22 DIAGNOSIS — F11.11 HISTORY OF HEROIN ABUSE (HCC): ICD-10-CM

## 2024-02-22 DIAGNOSIS — F17.210 NICOTINE DEPENDENCE, CIGARETTES, UNCOMPLICATED: ICD-10-CM

## 2024-02-22 DIAGNOSIS — Z77.090 ASBESTOS EXPOSURE: ICD-10-CM

## 2024-02-22 DIAGNOSIS — F14.10 COCAINE USE DISORDER (HCC): ICD-10-CM

## 2024-02-22 DIAGNOSIS — R06.02 SHORTNESS OF BREATH: Primary | ICD-10-CM

## 2024-02-22 DIAGNOSIS — Z87.891 PERSONAL HISTORY OF TOBACCO USE, PRESENTING HAZARDS TO HEALTH: ICD-10-CM

## 2024-02-22 DIAGNOSIS — R00.1 BRADYCARDIA: ICD-10-CM

## 2024-02-22 RX ORDER — NICOTINE 21 MG/24HR
1 PATCH, TRANSDERMAL 24 HOURS TRANSDERMAL DAILY
Qty: 28 PATCH | Refills: 0 | Status: SHIPPED | OUTPATIENT
Start: 2024-02-22 | End: 2024-03-21

## 2024-02-22 RX ORDER — POLYETHYLENE GLYCOL 3350 17 G
2 POWDER IN PACKET (EA) ORAL PRN
Qty: 100 EACH | Refills: 3 | Status: SHIPPED | OUTPATIENT
Start: 2024-02-22

## 2024-02-22 RX ORDER — ALBUTEROL SULFATE 90 UG/1
1-2 AEROSOL, METERED RESPIRATORY (INHALATION) EVERY 4 HOURS PRN
Qty: 1 EACH | Refills: 5 | Status: SHIPPED | OUTPATIENT
Start: 2024-02-22

## 2024-02-22 RX ORDER — FLUTICASONE PROPIONATE AND SALMETEROL 500; 50 UG/1; UG/1
1 POWDER RESPIRATORY (INHALATION) EVERY 12 HOURS
Qty: 3 EACH | Refills: 3 | Status: SHIPPED | OUTPATIENT
Start: 2024-02-22

## 2024-02-22 RX ORDER — DICLOFENAC SODIUM 75 MG/1
75 TABLET, DELAYED RELEASE ORAL 2 TIMES DAILY
COMMUNITY
Start: 2024-01-20 | End: 2024-02-23

## 2024-02-22 ASSESSMENT — PATIENT HEALTH QUESTIONNAIRE - PHQ9
SUM OF ALL RESPONSES TO PHQ9 QUESTIONS 1 & 2: 2
SUM OF ALL RESPONSES TO PHQ QUESTIONS 1-9: 2
2. FEELING DOWN, DEPRESSED OR HOPELESS: 1
1. LITTLE INTEREST OR PLEASURE IN DOING THINGS: 1
SUM OF ALL RESPONSES TO PHQ QUESTIONS 1-9: 2

## 2024-02-22 NOTE — PROGRESS NOTES
[address] [facility]  [address]  [place]  [address]  [phone]    [facility]  Pulmonary, Critical Care, and Sleep Medicine    Pulmonary Office Initial Consultation  Name: Adin Shen 63 y.o. male  MRN: [id number]  : 1960  Service Date: 24    Referring Provider: Shola Galaviz MD  [address] [facility]  [address]  [place] [address]  Chief Complaint:   Chief Complaint   Patient presents with    New Patient     Referred by Dr. Shola Galaviz for SOB & pleural plaque with presence of asbestos    lung mass     Last seen 2019 by Dr. TONYA Velasquez    Nicotine Dependence    Breathing Problem     WILSON    laryngopharyngeal reflux           Other     Asbestos induced plerual plaque, centrilobular emphysema & abnormal PET of the colon    Results     Stress test 2023, Echo 2023         History of Present Illness:  (Poor historian, accompanied by wife)  Adin Shen is a 63 y.o. male, who presents to Pulmonary clinic referred for SOB.  Pt last seen in our clinic on 19.  Reports he is still smoking 1PPD  Pt on Suboxne program - not using heroin.  Pt still smoking cocaine--- relapsed shortly after last visit  Occ hx:   at [employer] (+ asbestos exposure)  Reports significant dyspnea on exertion, mild to moderate.  Only can go up 1 flight of steps  mMRC of 2-3  Pt not on any inhalers for 4-5 years  Reports ongoing cough with black sptuum, worse in mornings  No recent exacerbations  With increased sleepiness and fatigue.  Patient reports he has seen cardiology, underwent event monitor for bradycardia      Past Medical Hx: I have personally reviewed medical hx  Past Medical History:   Diagnosis Date    Acid indigestion     Bell's palsy     Cardiomegaly      LVH on EKG    Chronic back pain     Cocaine abuse (HCC)     Drug use     GERD (gastroesophageal reflux disease)     Heroin causing adverse effect in therapeutic use

## 2024-02-22 NOTE — PROGRESS NOTES
Adinyaya Khand presents today for   Chief Complaint   Patient presents with    New Patient     Referred by Dr. Shola Galaviz for SOB & pleural plaque with presence of asbestos    lung mass     Last seen 2019 by Dr. TONYA Velasquez    Nicotine Dependence    Breathing Problem     WILSON    laryngopharyngeal reflux           Other     Asbestos induced plerual plaque, centrilobular emphysema & abnormal PET of the colon    Results     Stress test 2023, Echo 2023       Is someone accompanying this pt? Yes. Family member    Is the patient using any DME equipment during OV? No    -DME Company N/A    Depression Screenin/22/2024     9:39 AM   PHQ-9 Questionaire   Little interest or pleasure in doing things 1   Feeling down, depressed, or hopeless 1   PHQ-9 Total Score 2       Learning Needs Questionnaire:     Who is the primary learner? Patient    What is the preferred language for health care of the primary learner? ENGLISH    How does the primary learner prefer to learn new concepts? DEMONSTRATION    Answered By Patient    Relationship to Learner SELF          Fall Risk:         2024    10:43 AM   Fall Risk   2 or more falls in past year? no   Fall with injury in past year? no        Abuse Screenin/22/2024     9:00 AM   AMB Abuse Screening   Do you ever feel afraid of your partner? N   Are you in a relationship with someone who physically or mentally threatens you? N   Is it safe for you to go home? Y         Coordination of Care:    1. Have you been to the ER, urgent care clinic since your last visit? Hospitalized since your last visit? No    2. Have you seen or consulted any other health care providers outside of the [facility] since your last visit? Include any pap smears or colon screening. No    Medication list has been update per patient.    Per patient, did not receive influenza vaccine last season.

## 2024-03-04 ENCOUNTER — HOSPITAL ENCOUNTER (EMERGENCY)
Facility: HOSPITAL | Age: 64
Discharge: HOME | End: 2024-03-04

## 2024-03-04 ENCOUNTER — HOSPITAL ENCOUNTER (OUTPATIENT)
Facility: HOSPITAL | Age: 64
Discharge: HOME | End: 2024-03-07
Attending: INTERNAL MEDICINE

## 2024-03-04 ENCOUNTER — HOSPITAL ENCOUNTER (OUTPATIENT)
Facility: HOSPITAL | Age: 64
Discharge: HOME | End: 2024-03-07

## 2024-03-04 ENCOUNTER — APPOINTMENT (OUTPATIENT)
Facility: HOSPITAL | Age: 64
End: 2024-03-04

## 2024-03-04 VITALS
HEART RATE: 50 BPM | DIASTOLIC BLOOD PRESSURE: 78 MMHG | RESPIRATION RATE: 18 BRPM | TEMPERATURE: 98 F | SYSTOLIC BLOOD PRESSURE: 133 MMHG | HEIGHT: 69 IN | WEIGHT: 170 LBS | BODY MASS INDEX: 25.18 KG/M2 | OXYGEN SATURATION: 99 %

## 2024-03-04 DIAGNOSIS — Z77.090 ASBESTOS EXPOSURE: ICD-10-CM

## 2024-03-04 DIAGNOSIS — Z87.891 PERSONAL HISTORY OF TOBACCO USE, PRESENTING HAZARDS TO HEALTH: ICD-10-CM

## 2024-03-04 DIAGNOSIS — M79.645 PAIN IN LEFT FINGER(S): Primary | ICD-10-CM

## 2024-03-04 DIAGNOSIS — R00.1 BRADYCARDIA: ICD-10-CM

## 2024-03-04 DIAGNOSIS — F17.210 NICOTINE DEPENDENCE, CIGARETTES, UNCOMPLICATED: ICD-10-CM

## 2024-03-04 DIAGNOSIS — F14.10 COCAINE USE DISORDER (HCC): ICD-10-CM

## 2024-03-04 DIAGNOSIS — J44.9 COPD, GROUP B, BY GOLD 2017 CLASSIFICATION (HCC): ICD-10-CM

## 2024-03-04 DIAGNOSIS — J92.0 PLEURAL PLAQUE DUE TO ASBESTOS EXPOSURE: ICD-10-CM

## 2024-03-04 DIAGNOSIS — F11.11 HISTORY OF HEROIN ABUSE (HCC): ICD-10-CM

## 2024-03-04 DIAGNOSIS — R06.02 SHORTNESS OF BREATH: ICD-10-CM

## 2024-03-04 LAB — SENTARA SPECIMEN COLLECTION: NORMAL

## 2024-03-04 PROCEDURE — 71271 CT THORAX LUNG CANCER SCR C-: CPT

## 2024-03-04 PROCEDURE — 73140 X-RAY EXAM OF FINGER(S): CPT

## 2024-03-04 PROCEDURE — 99283 EMERGENCY DEPT VISIT LOW MDM: CPT

## 2024-03-04 ASSESSMENT — ENCOUNTER SYMPTOMS
DIARRHEA: 0
CHEST TIGHTNESS: 0
ABDOMINAL PAIN: 0
SHORTNESS OF BREATH: 0
CONSTIPATION: 0
COUGH: 0
NAUSEA: 0
VOMITING: 0

## 2024-03-04 ASSESSMENT — PAIN DESCRIPTION - ORIENTATION: ORIENTATION: LEFT

## 2024-03-04 ASSESSMENT — PAIN DESCRIPTION - LOCATION: LOCATION: FINGER (COMMENT WHICH ONE)

## 2024-03-04 ASSESSMENT — PAIN - FUNCTIONAL ASSESSMENT: PAIN_FUNCTIONAL_ASSESSMENT: 0-10

## 2024-03-04 ASSESSMENT — PAIN DESCRIPTION - PAIN TYPE: TYPE: ACUTE PAIN

## 2024-03-04 ASSESSMENT — LIFESTYLE VARIABLES
HOW MANY STANDARD DRINKS CONTAINING ALCOHOL DO YOU HAVE ON A TYPICAL DAY: PATIENT DOES NOT DRINK
HOW OFTEN DO YOU HAVE A DRINK CONTAINING ALCOHOL: NEVER

## 2024-03-04 ASSESSMENT — PAIN DESCRIPTION - DESCRIPTORS: DESCRIPTORS: ACHING

## 2024-03-04 ASSESSMENT — PAIN SCALES - GENERAL: PAINLEVEL_OUTOF10: 8

## 2024-03-04 NOTE — DISCHARGE INSTRUCTIONS
Alternate tylenol and ibuprofen as needed for pain.   Place padding on the area to help with discomfort.   Please follow-up with Dr. Rahman also within the next 2 days in order to be reevaluated.  Return to the ED with any new or worsening symptoms.

## 2024-03-04 NOTE — ED PROVIDER NOTES
inhaler  Commonly known as: Proventil HFA  Inhale 1-2 puffs into the lungs every 4 hours as needed for Wheezing or Shortness of Breath Can substitute any formulary approved albuterol formulation (Proventil, Proair, Ventolin, etc)     amLODIPine 5 MG tablet  Commonly known as: NORVASC  Take 1 tablet by mouth daily     buprenorphine-naloxone 8-2 MG Film SL film  Commonly known as: SUBOXONE     diclofenac 75 MG EC tablet  Commonly known as: VOLTAREN     fluticasone-salmeterol 500-50 MCG/ACT Aepb diskus inhaler  Commonly known as: Advair Diskus  Inhale 1 puff into the lungs in the morning and 1 puff in the evening. Rinse and gargle mouth after each use.     ibuprofen 600 MG tablet  Commonly known as: ADVIL;MOTRIN     * nicotine 21 MG/24HR  Commonly known as: NICODERM CQ  Place 1 patch onto the skin daily for 28 days     * nicotine 14 MG/24HR  Commonly known as: NICODERM CQ  Place 1 patch onto the skin daily for 28 days     * nicotine 7 MG/24HR  Commonly known as: NICODERM CQ  Place 1 patch onto the skin daily     nicotine polacrilex 2 MG lozenge  Commonly known as: COMMIT  Take 1 lozenge by mouth as needed for Smoking cessation Q1h PRN for cigarette cravings     tadalafil 10 MG tablet  Commonly known as: Cialis  Take 1 tablet by mouth 30 mins before intercourse of erectile dysfunction. Do not take more than 1 tablet every 2 days     tiotropium 2.5 MCG/ACT Aers inhaler  Commonly known as: Spiriva Respimat  Inhale 2 puffs into the lungs daily Please load and prime for patient           * This list has 3 medication(s) that are the same as other medications prescribed for you. Read the directions carefully, and ask your doctor or other care provider to review them with you.                   Dictation disclaimer:  Please note that this dictation was completed with [employer], the [employer] voice recognition software.  Quite often unanticipated grammatical, syntax, homophones, and other interpretive errors are inadvertently

## 2024-03-04 NOTE — ED TRIAGE NOTES
Patient reports having left hand pain that hurts every now and again. Patient reports that he hit his hand this morning. Left fourth digit swelling noted.

## 2024-03-06 LAB
A/G RATIO: 1.3 RATIO (ref 1.1–2.6)
ALBUMIN SERPL-MCNC: 4 G/DL (ref 3.5–5)
ALP BLD-CCNC: 83 U/L (ref 40–125)
ALT SERPL-CCNC: 16 U/L (ref 5–40)
ANION GAP SERPL CALCULATED.3IONS-SCNC: 4 MMOL/L (ref 3–15)
AST SERPL-CCNC: 23 U/L (ref 10–37)
BILIRUB SERPL-MCNC: 0.4 MG/DL (ref 0.2–1.2)
BUN BLDV-MCNC: 12 MG/DL (ref 6–22)
CALCIUM SERPL-MCNC: 9.2 MG/DL (ref 8.4–10.5)
CHLORIDE BLD-SCNC: 105 MMOL/L (ref 98–110)
CHOLESTEROL/HDL RATIO: 2.1 (ref 0–5)
CHOLESTEROL: 132 MG/DL (ref 110–200)
CO2: 31 MMOL/L (ref 20–32)
CREAT SERPL-MCNC: 1 MG/DL (ref 0.8–1.6)
GLOBULIN: 3.1 G/DL (ref 2–4)
GLOMERULAR FILTRATION RATE: >60 ML/MIN/1.73 SQ.M.
GLUCOSE: 107 MG/DL (ref 70–99)
HCT VFR BLD CALC: 41.9 % (ref 39.3–51.6)
HDLC SERPL-MCNC: 63 MG/DL
HEMOGLOBIN: 12.4 G/DL (ref 13.1–17.2)
HIV -1/0/2 AG/AB WITH REFLEX: NON REACTIVE
HIV INTERPRETATION: NORMAL
LDL CHOLESTEROL CALCULATED: 50 MG/DL (ref 50–99)
LDL/HDL RATIO: 0.8
MCH RBC QN AUTO: 26 PG (ref 26–34)
MCHC RBC AUTO-ENTMCNC: 30 G/DL (ref 31–36)
MCV RBC AUTO: 89 FL (ref 80–95)
NON-HDL CHOLESTEROL: 69 MG/DL
PDW BLD-RTO: 14.6 % (ref 10–15.5)
PLATELET # BLD: 153 K/UL (ref 140–440)
PMV BLD AUTO: 14 FL (ref 9–13)
POTASSIUM SERPL-SCNC: 4.6 MMOL/L (ref 3.5–5.5)
RBC: 4.73 M/UL (ref 3.8–5.8)
SEX HORMONE BINDING GLOBULIN: 70 NMOL/L (ref 22–77)
SODIUM BLD-SCNC: 140 MMOL/L (ref 133–145)
T4 FREE: 1.1 NG/DL (ref 0.9–1.8)
TESTOSTERONE TOTAL: 472 NG/DL (ref 348–1197)
TOTAL PROTEIN: 7.1 G/DL (ref 6.2–8.1)
TRIGL SERPL-MCNC: 95 MG/DL (ref 40–149)
TSH SERPL DL<=0.05 MIU/L-ACNC: 1.1 MCU/ML (ref 0.27–4.2)
VLDLC SERPL CALC-MCNC: 19 MG/DL (ref 8–30)
WBC: 4.8 K/UL (ref 4–11)

## 2024-04-22 ENCOUNTER — OFFICE VISIT (OUTPATIENT)
Age: 64
End: 2024-04-22
Payer: MEDICAID

## 2024-04-22 VITALS
SYSTOLIC BLOOD PRESSURE: 122 MMHG | OXYGEN SATURATION: 97 % | BODY MASS INDEX: 23.77 KG/M2 | HEART RATE: 46 BPM | HEIGHT: 70 IN | DIASTOLIC BLOOD PRESSURE: 80 MMHG | WEIGHT: 166 LBS

## 2024-04-22 DIAGNOSIS — I10 ESSENTIAL HYPERTENSION WITH GOAL BLOOD PRESSURE LESS THAN 140/90: Primary | ICD-10-CM

## 2024-04-22 DIAGNOSIS — R00.1 BRADYCARDIA: ICD-10-CM

## 2024-04-22 PROCEDURE — 3079F DIAST BP 80-89 MM HG: CPT | Performed by: INTERNAL MEDICINE

## 2024-04-22 PROCEDURE — 99214 OFFICE O/P EST MOD 30 MIN: CPT | Performed by: INTERNAL MEDICINE

## 2024-04-22 PROCEDURE — 3074F SYST BP LT 130 MM HG: CPT | Performed by: INTERNAL MEDICINE

## 2024-04-22 NOTE — PROGRESS NOTES
[facility]    Adin Shen is 63 y.o. male     Patient is here today for follow-up appointment  Denies any prior history of MI or CHF  Denies any resting or exertional chest pain or chest pressure to suggest angina or any dyspnea to suggest heart failure.   No presyncope or syncope  Denies any PND or LE edema.   Taking all medications regularly.     Past Medical History:   Diagnosis Date    Acid indigestion     Bell's palsy     Cardiomegaly     1/12 LVH on EKG    Chronic back pain     Cocaine abuse (HCC)     Drug use     GERD (gastroesophageal reflux disease)     Heroin causing adverse effect in therapeutic use     Nonspecific abnormal electrocardiogram (ECG) (EKG)     S Jcarlos, RBBB    Pulmonary nodule     Tobacco use disorder        Review of Systems:  Cardiac symptoms as noted above in HPI. All others negative.    Current Outpatient Medications   Medication Sig    nicotine (NICODERM CQ) 7 MG/24HR Place 1 patch onto the skin daily    nicotine polacrilex (COMMIT) 2 MG lozenge Take 1 lozenge by mouth as needed for Smoking cessation Q1h PRN for cigarette cravings    fluticasone-salmeterol (ADVAIR DISKUS) 500-50 MCG/ACT AEPB diskus inhaler Inhale 1 puff into the lungs in the morning and 1 puff in the evening. Rinse and gargle mouth after each use.    tiotropium (SPIRIVA RESPIMAT) 2.5 MCG/ACT AERS inhaler Inhale 2 puffs into the lungs daily Please load and prime for patient    albuterol sulfate HFA (PROVENTIL HFA) 108 (90 Base) MCG/ACT inhaler Inhale 1-2 puffs into the lungs every 4 hours as needed for Wheezing or Shortness of Breath Can substitute any formulary approved albuterol formulation (Proventil, Proair, Ventolin, etc)    ibuprofen (ADVIL;MOTRIN) 600 MG tablet Take 1 tablet by mouth every 6 hours as needed for Pain or Fever    amLODIPine (NORVASC) 5 MG tablet Take 1 tablet by mouth daily    tadalafil (CIALIS) 10 MG tablet Take 1 tablet by

## 2025-01-07 ENCOUNTER — OFFICE VISIT (OUTPATIENT)
Age: 65
End: 2025-01-07
Payer: COMMERCIAL

## 2025-01-07 VITALS
WEIGHT: 171.2 LBS | HEART RATE: 51 BPM | BODY MASS INDEX: 25.36 KG/M2 | SYSTOLIC BLOOD PRESSURE: 164 MMHG | HEIGHT: 69 IN | DIASTOLIC BLOOD PRESSURE: 67 MMHG | TEMPERATURE: 98.4 F | OXYGEN SATURATION: 99 % | RESPIRATION RATE: 16 BRPM

## 2025-01-07 DIAGNOSIS — J44.9 COPD, GROUP B, BY GOLD 2017 CLASSIFICATION (HCC): Primary | ICD-10-CM

## 2025-01-07 DIAGNOSIS — R06.81 WITNESSED APNEIC SPELLS: ICD-10-CM

## 2025-01-07 DIAGNOSIS — F17.210 NICOTINE DEPENDENCE, CIGARETTES, UNCOMPLICATED: ICD-10-CM

## 2025-01-07 DIAGNOSIS — R91.1 PULMONARY NODULE: ICD-10-CM

## 2025-01-07 DIAGNOSIS — R06.83 SNORING: ICD-10-CM

## 2025-01-07 DIAGNOSIS — R06.02 SHORTNESS OF BREATH: ICD-10-CM

## 2025-01-07 DIAGNOSIS — J92.0 PLEURAL PLAQUE DUE TO ASBESTOS EXPOSURE: ICD-10-CM

## 2025-01-07 DIAGNOSIS — Z72.0 TOBACCO ABUSE: ICD-10-CM

## 2025-01-07 PROCEDURE — 99406 BEHAV CHNG SMOKING 3-10 MIN: CPT | Performed by: INTERNAL MEDICINE

## 2025-01-07 PROCEDURE — 99214 OFFICE O/P EST MOD 30 MIN: CPT | Performed by: INTERNAL MEDICINE

## 2025-01-07 RX ORDER — FLUTICASONE PROPIONATE AND SALMETEROL 500; 50 UG/1; UG/1
1 POWDER RESPIRATORY (INHALATION) EVERY 12 HOURS
Qty: 3 EACH | Refills: 3 | Status: SHIPPED | OUTPATIENT
Start: 2025-01-07

## 2025-01-07 RX ORDER — NICOTINE 21 MG/24HR
1 PATCH, TRANSDERMAL 24 HOURS TRANSDERMAL DAILY
Qty: 28 PATCH | Refills: 0 | Status: SHIPPED | OUTPATIENT
Start: 2025-02-18 | End: 2025-03-18

## 2025-01-07 RX ORDER — NICOTINE 21 MG/24HR
1 PATCH, TRANSDERMAL 24 HOURS TRANSDERMAL DAILY
Qty: 42 PATCH | Refills: 0 | Status: SHIPPED | OUTPATIENT
Start: 2025-01-07 | End: 2025-02-18

## 2025-01-07 RX ORDER — ALBUTEROL SULFATE 90 UG/1
1-2 INHALANT RESPIRATORY (INHALATION) EVERY 4 HOURS PRN
Qty: 1 EACH | Refills: 5 | Status: SHIPPED | OUTPATIENT
Start: 2025-01-07

## 2025-01-07 NOTE — PROGRESS NOTES
Adin Shen presents today for   Chief Complaint   Patient presents with    Shortness of Breath    COPD       Is someone accompanying this pt? Wife    Is the patient using any DME equipment during OV? No    -DME Company no    Depression Screenin/22/2024     9:39 AM   PHQ-9 Questionaire   Little interest or pleasure in doing things 1   Feeling down, depressed, or hopeless 1   PHQ-9 Total Score 2       Learning Assessment:    Failed to redirect to the Timeline version of the [employer] Smart[employer].    Abuse Screening:         No data to display                Fall Risk    Failed to redirect to the Timeline version of the [employer] Smart[employer].    Coordination of Care:    1. Have you been to the ER, urgent care clinic since your last visit? Hospitalized since your last visit? No    2. Have you seen or consulted any other health care providers outside of the [facility] since your last visit? Include any pap smears or colon screening. No    Medication list has been update per patient.    
loss: Defer management to PCP.  Medication noncompliance  Snoring with witnessed apnea: Patient at increased risk for sleep disordered breathing.      RECOMMENDATIONS:   Obtain repeat CT chest for reevaluation of pulmonary nodules  Resume Advair 250/50 micrograms; 1 puff twice daily.  Patient instructed to rinse mouth after each use.  Resume Spiriva Respimat 2.5 mcg; 2 puffs once daily  Albuterol rescue inhaler PRN  Obtain sleep study - HST  Nicotine patches with taper provided  Counseled patient on proper inhaler technique  Cessation of all tobacco and substance use strongly counseled  Refer patient back to cardiology for workup and management of symptomatic bradycardia--patient may need pacemaker  Healthy lifestyle advised  Vaccination: Recommend all age-appropriate immunizations including influenza and COVID-19 booster  LCS: shared decision making performed.  Advised patient to have this performed annually until age 80 per USPSTF guidelines updated in 2021  Follow up -3 months & or sooner should new symptoms or problems arise.     Education:  Inhaler techniques, MDI/spacers, nebulizers use, goal setting, monitoring  Reducing anxiety, energy conservation tips, exercise, medications and Nutrition    Smoking cessation  The patient was counseled on the dangers of tobacco use, and was advised to quit and reluctant to quit  Reviewed strategies to maximize success, including removing cigarettes and smoking materials from environment, stress management, written materials, and pharmacotherapy (nicotine replacement). A total of 4 minutes was spent on this discussion.    Health maintenance screens deferred to Primary care provider.     Quinn Tim DO  1/7/2025  Pulmonary, Critical Care Medicine  [facility]

## 2025-01-07 NOTE — PATIENT INSTRUCTIONS
What is the plan?  -Complete CAT/CT scan of your chest  -Complete Sleep study to check for Sleep apnea      -Resume Advair 1 puff Twice daily and Spiriva 2 puffs once daily - take weather or not you feel short of breath  -Continue Albuterol rescue inhaler as needed  -Stop use of ALL tobacco products. Stop use of cocaine as well.

## 2025-01-15 ENCOUNTER — HOSPITAL ENCOUNTER (OUTPATIENT)
Facility: HOSPITAL | Age: 65
Discharge: HOME | End: 2025-01-18
Attending: INTERNAL MEDICINE
Payer: COMMERCIAL

## 2025-01-15 DIAGNOSIS — R91.1 PULMONARY NODULE: ICD-10-CM

## 2025-01-15 DIAGNOSIS — R06.02 SHORTNESS OF BREATH: ICD-10-CM

## 2025-01-15 DIAGNOSIS — J44.9 COPD, GROUP B, BY GOLD 2017 CLASSIFICATION (HCC): ICD-10-CM

## 2025-01-15 PROCEDURE — 71250 CT THORAX DX C-: CPT

## 2025-01-21 ENCOUNTER — TELEPHONE (OUTPATIENT)
Age: 65
End: 2025-01-21

## 2025-01-21 DIAGNOSIS — R93.89 ABNORMAL CT OF THE CHEST: Primary | ICD-10-CM

## 2025-01-21 NOTE — TELEPHONE ENCOUNTER
Attempted to reach patient concerning CT results without success.  VM left.  Will go ahead and order PET/CT.  Will direct staff to inform patient.    Quinn Tim, DO   01/21/25  Pulmonary, Critical Care Medicine  [facility]

## 2025-01-28 NOTE — TELEPHONE ENCOUNTER
[facility]

## 2025-01-29 ENCOUNTER — TELEPHONE (OUTPATIENT)
Age: 65
End: 2025-01-29

## 2025-01-29 NOTE — TELEPHONE ENCOUNTER
Spoke with the patient and partner and went over the CT results they had a few questions everything was answered and PET scan is schd for 2/7

## 2025-02-05 ENCOUNTER — HOSPITAL ENCOUNTER (EMERGENCY)
Facility: HOSPITAL | Age: 65
Discharge: HOME | DRG: 816 | End: 2025-02-06
Attending: EMERGENCY MEDICINE
Payer: COMMERCIAL

## 2025-02-05 ENCOUNTER — APPOINTMENT (OUTPATIENT)
Facility: HOSPITAL | Age: 65
DRG: 816 | End: 2025-02-05
Attending: EMERGENCY MEDICINE
Payer: COMMERCIAL

## 2025-02-05 DIAGNOSIS — R00.1 BRADYCARDIA: ICD-10-CM

## 2025-02-05 DIAGNOSIS — J18.9 PNEUMONIA OF RIGHT LOWER LOBE DUE TO INFECTIOUS ORGANISM: Primary | ICD-10-CM

## 2025-02-05 LAB
ALBUMIN SERPL-MCNC: 3.3 G/DL (ref 3.4–5)
ALBUMIN/GLOB SERPL: 0.8 (ref 0.8–1.7)
ALP SERPL-CCNC: 96 U/L (ref 45–117)
ALT SERPL-CCNC: 19 U/L (ref 16–61)
AMPHET UR QL SCN: NEGATIVE
ANION GAP SERPL CALC-SCNC: 0 MMOL/L (ref 3–18)
AST SERPL-CCNC: 20 U/L (ref 10–38)
BARBITURATES UR QL SCN: NEGATIVE
BASOPHILS # BLD: 0.03 K/UL (ref 0–0.1)
BASOPHILS NFR BLD: 0.7 % (ref 0–2)
BENZODIAZ UR QL: NEGATIVE
BILIRUB SERPL-MCNC: 0.5 MG/DL (ref 0.2–1)
BUN SERPL-MCNC: 13 MG/DL (ref 7–18)
BUN/CREAT SERPL: 10 (ref 12–20)
CALCIUM SERPL-MCNC: 8.8 MG/DL (ref 8.5–10.1)
CANNABINOIDS UR QL SCN: NEGATIVE
CHLORIDE SERPL-SCNC: 106 MMOL/L (ref 100–111)
CO2 SERPL-SCNC: 33 MMOL/L (ref 21–32)
COCAINE UR QL SCN: POSITIVE
CREAT SERPL-MCNC: 1.27 MG/DL (ref 0.6–1.3)
DIFFERENTIAL METHOD BLD: ABNORMAL
EOSINOPHIL # BLD: 0.13 K/UL (ref 0–0.4)
EOSINOPHIL NFR BLD: 2.9 % (ref 0–5)
ERYTHROCYTE [DISTWIDTH] IN BLOOD BY AUTOMATED COUNT: 14 % (ref 11.6–14.5)
GLOBULIN SER CALC-MCNC: 4.3 G/DL (ref 2–4)
GLUCOSE SERPL-MCNC: 94 MG/DL (ref 74–99)
HCT VFR BLD AUTO: 41 % (ref 36–48)
HGB BLD-MCNC: 12.7 G/DL (ref 13–16)
IMM GRANULOCYTES # BLD AUTO: 0 K/UL (ref 0–0.04)
IMM GRANULOCYTES NFR BLD AUTO: 0 % (ref 0–0.5)
LYMPHOCYTES # BLD: 1.39 K/UL (ref 0.9–3.6)
LYMPHOCYTES NFR BLD: 30.9 % (ref 21–52)
Lab: ABNORMAL
MAGNESIUM SERPL-MCNC: 2.4 MG/DL (ref 1.6–2.6)
MCH RBC QN AUTO: 26.1 PG (ref 24–34)
MCHC RBC AUTO-ENTMCNC: 31 G/DL (ref 31–37)
MCV RBC AUTO: 84.4 FL (ref 78–100)
METHADONE UR QL: NEGATIVE
MONOCYTES # BLD: 0.46 K/UL (ref 0.05–1.2)
MONOCYTES NFR BLD: 10.2 % (ref 3–10)
NEUTS SEG # BLD: 2.49 K/UL (ref 1.8–8)
NEUTS SEG NFR BLD: 55.3 % (ref 40–73)
NRBC # BLD: 0 K/UL (ref 0–0.01)
NRBC BLD-RTO: 0 PER 100 WBC
OPIATES UR QL: NEGATIVE
PCP UR QL: NEGATIVE
PLATELET # BLD AUTO: 145 K/UL (ref 135–420)
PMV BLD AUTO: 13.1 FL (ref 9.2–11.8)
POTASSIUM SERPL-SCNC: 4.2 MMOL/L (ref 3.5–5.5)
PROT SERPL-MCNC: 7.6 G/DL (ref 6.4–8.2)
RBC # BLD AUTO: 4.86 M/UL (ref 4.35–5.65)
SODIUM SERPL-SCNC: 139 MMOL/L (ref 136–145)
TROPONIN I SERPL HS-MCNC: 26 NG/L (ref 0–78)
TROPONIN I SERPL HS-MCNC: 27 NG/L (ref 0–78)
WBC # BLD AUTO: 4.5 K/UL (ref 4.6–13.2)

## 2025-02-05 PROCEDURE — 80307 DRUG TEST PRSMV CHEM ANLYZR: CPT

## 2025-02-05 PROCEDURE — 83735 ASSAY OF MAGNESIUM: CPT

## 2025-02-05 PROCEDURE — 71045 X-RAY EXAM CHEST 1 VIEW: CPT

## 2025-02-05 PROCEDURE — 93005 ELECTROCARDIOGRAM TRACING: CPT | Performed by: EMERGENCY MEDICINE

## 2025-02-05 PROCEDURE — 80053 COMPREHEN METABOLIC PANEL: CPT

## 2025-02-05 PROCEDURE — 84484 ASSAY OF TROPONIN QUANT: CPT

## 2025-02-05 PROCEDURE — 85025 COMPLETE CBC W/AUTO DIFF WBC: CPT

## 2025-02-05 ASSESSMENT — ENCOUNTER SYMPTOMS
SHORTNESS OF BREATH: 0
ABDOMINAL PAIN: 1
EYES NEGATIVE: 1
WHEEZING: 0
VOMITING: 0
NAUSEA: 0

## 2025-02-05 ASSESSMENT — PAIN SCALES - GENERAL: PAINLEVEL_OUTOF10: 7

## 2025-02-05 ASSESSMENT — PAIN - FUNCTIONAL ASSESSMENT: PAIN_FUNCTIONAL_ASSESSMENT: 0-10

## 2025-02-06 ENCOUNTER — APPOINTMENT (OUTPATIENT)
Facility: HOSPITAL | Age: 65
DRG: 816 | End: 2025-02-06
Payer: COMMERCIAL

## 2025-02-06 ENCOUNTER — HOSPITAL ENCOUNTER (INPATIENT)
Facility: HOSPITAL | Age: 65
LOS: 3 days | Discharge: HOME | DRG: 816 | End: 2025-02-12
Attending: EMERGENCY MEDICINE | Admitting: STUDENT IN AN ORGANIZED HEALTH CARE EDUCATION/TRAINING PROGRAM
Payer: COMMERCIAL

## 2025-02-06 VITALS
RESPIRATION RATE: 11 BRPM | HEART RATE: 38 BPM | WEIGHT: 163 LBS | BODY MASS INDEX: 24.14 KG/M2 | HEIGHT: 69 IN | SYSTOLIC BLOOD PRESSURE: 140 MMHG | TEMPERATURE: 98.1 F | DIASTOLIC BLOOD PRESSURE: 69 MMHG | OXYGEN SATURATION: 100 %

## 2025-02-06 DIAGNOSIS — I25.10 CORONARY ARTERY DISEASE: ICD-10-CM

## 2025-02-06 DIAGNOSIS — R07.9 CHEST PAIN, UNSPECIFIED TYPE: ICD-10-CM

## 2025-02-06 DIAGNOSIS — I45.10 RBBB: ICD-10-CM

## 2025-02-06 DIAGNOSIS — R94.39 ABNORMAL STRESS TEST: ICD-10-CM

## 2025-02-06 DIAGNOSIS — R07.2 PRECORDIAL PAIN: ICD-10-CM

## 2025-02-06 DIAGNOSIS — R07.89 CHEST PRESSURE: ICD-10-CM

## 2025-02-06 DIAGNOSIS — R00.1 SINUS BRADYCARDIA: Primary | ICD-10-CM

## 2025-02-06 PROBLEM — R79.89 ELEVATED TROPONIN: Status: ACTIVE | Noted: 2025-02-06

## 2025-02-06 PROBLEM — I10 ESSENTIAL HYPERTENSION: Status: ACTIVE | Noted: 2025-02-06

## 2025-02-06 PROBLEM — D64.9 NORMOCYTIC ANEMIA: Status: ACTIVE | Noted: 2025-02-06

## 2025-02-06 PROBLEM — J18.9 COMMUNITY ACQUIRED PNEUMONIA OF RIGHT LUNG: Status: ACTIVE | Noted: 2025-02-06

## 2025-02-06 LAB
ALBUMIN SERPL-MCNC: 3.1 G/DL (ref 3.4–5)
ALBUMIN/GLOB SERPL: 0.7 (ref 0.8–1.7)
ALP SERPL-CCNC: 96 U/L (ref 45–117)
ALT SERPL-CCNC: 21 U/L (ref 16–61)
ANION GAP SERPL CALC-SCNC: 2 MMOL/L (ref 3–18)
AST SERPL-CCNC: 15 U/L (ref 10–38)
BASOPHILS # BLD: 0.03 K/UL (ref 0–0.1)
BASOPHILS NFR BLD: 0.7 % (ref 0–2)
BILIRUB SERPL-MCNC: 0.3 MG/DL (ref 0.2–1)
BUN SERPL-MCNC: 14 MG/DL (ref 7–18)
BUN/CREAT SERPL: 12 (ref 12–20)
CALCIUM SERPL-MCNC: 8.9 MG/DL (ref 8.5–10.1)
CHLORIDE SERPL-SCNC: 109 MMOL/L (ref 100–111)
CO2 SERPL-SCNC: 31 MMOL/L (ref 21–32)
CREAT SERPL-MCNC: 1.13 MG/DL (ref 0.6–1.3)
DIFFERENTIAL METHOD BLD: ABNORMAL
EKG ATRIAL RATE: 36 BPM
EKG ATRIAL RATE: 39 BPM
EKG DIAGNOSIS: NORMAL
EKG DIAGNOSIS: NORMAL
EKG P AXIS: 52 DEGREES
EKG P AXIS: 57 DEGREES
EKG P-R INTERVAL: 160 MS
EKG P-R INTERVAL: 164 MS
EKG Q-T INTERVAL: 480 MS
EKG Q-T INTERVAL: 498 MS
EKG QRS DURATION: 146 MS
EKG QRS DURATION: 154 MS
EKG QTC CALCULATION (BAZETT): 385 MS
EKG QTC CALCULATION (BAZETT): 386 MS
EKG R AXIS: 18 DEGREES
EKG R AXIS: 66 DEGREES
EKG T AXIS: 15 DEGREES
EKG T AXIS: 49 DEGREES
EKG VENTRICULAR RATE: 36 BPM
EKG VENTRICULAR RATE: 39 BPM
EOSINOPHIL # BLD: 0.11 K/UL (ref 0–0.4)
EOSINOPHIL NFR BLD: 2.7 % (ref 0–5)
ERYTHROCYTE [DISTWIDTH] IN BLOOD BY AUTOMATED COUNT: 14.1 % (ref 11.6–14.5)
GLOBULIN SER CALC-MCNC: 4.2 G/DL (ref 2–4)
GLUCOSE SERPL-MCNC: 120 MG/DL (ref 74–99)
HCT VFR BLD AUTO: 42.2 % (ref 36–48)
HGB BLD-MCNC: 12.5 G/DL (ref 13–16)
IMM GRANULOCYTES # BLD AUTO: 0 K/UL (ref 0–0.04)
IMM GRANULOCYTES NFR BLD AUTO: 0 % (ref 0–0.5)
LACTATE BLD-SCNC: 0.95 MMOL/L (ref 0.4–2)
LYMPHOCYTES # BLD: 1.77 K/UL (ref 0.9–3.6)
LYMPHOCYTES NFR BLD: 44.1 % (ref 21–52)
MAGNESIUM SERPL-MCNC: 2.3 MG/DL (ref 1.6–2.6)
MCH RBC QN AUTO: 25.4 PG (ref 24–34)
MCHC RBC AUTO-ENTMCNC: 29.6 G/DL (ref 31–37)
MCV RBC AUTO: 85.6 FL (ref 78–100)
MONOCYTES # BLD: 0.49 K/UL (ref 0.05–1.2)
MONOCYTES NFR BLD: 12.2 % (ref 3–10)
NEUTS SEG # BLD: 1.61 K/UL (ref 1.8–8)
NEUTS SEG NFR BLD: 40.3 % (ref 40–73)
NRBC # BLD: 0 K/UL (ref 0–0.01)
NRBC BLD-RTO: 0 PER 100 WBC
NT PRO BNP: 106 PG/ML (ref 0–900)
PLATELET # BLD AUTO: 152 K/UL (ref 135–420)
PMV BLD AUTO: 13.4 FL (ref 9.2–11.8)
POTASSIUM SERPL-SCNC: 4.2 MMOL/L (ref 3.5–5.5)
PROCALCITONIN SERPL-MCNC: <0.05 NG/ML
PROT SERPL-MCNC: 7.3 G/DL (ref 6.4–8.2)
RBC # BLD AUTO: 4.93 M/UL (ref 4.35–5.65)
SODIUM SERPL-SCNC: 142 MMOL/L (ref 136–145)
TROPONIN I SERPL HS-MCNC: 26 NG/L (ref 0–78)
TROPONIN I SERPL HS-MCNC: 29 NG/L (ref 0–78)
WBC # BLD AUTO: 4 K/UL (ref 4.6–13.2)

## 2025-02-06 PROCEDURE — G0378 HOSPITAL OBSERVATION PER HR: HCPCS

## 2025-02-06 PROCEDURE — 93010 ELECTROCARDIOGRAM REPORT: CPT | Performed by: INTERNAL MEDICINE

## 2025-02-06 PROCEDURE — 84145 PROCALCITONIN (PCT): CPT

## 2025-02-06 PROCEDURE — 99285 EMERGENCY DEPT VISIT HI MDM: CPT

## 2025-02-06 PROCEDURE — 96375 TX/PRO/DX INJ NEW DRUG ADDON: CPT

## 2025-02-06 PROCEDURE — 84484 ASSAY OF TROPONIN QUANT: CPT

## 2025-02-06 PROCEDURE — 6370000000 HC RX 637 (ALT 250 FOR IP): Performed by: EMERGENCY MEDICINE

## 2025-02-06 PROCEDURE — 2500000003 HC RX 250 WO HCPCS: Performed by: EMERGENCY MEDICINE

## 2025-02-06 PROCEDURE — 93005 ELECTROCARDIOGRAM TRACING: CPT | Performed by: EMERGENCY MEDICINE

## 2025-02-06 PROCEDURE — 6360000002 HC RX W HCPCS

## 2025-02-06 PROCEDURE — 87040 BLOOD CULTURE FOR BACTERIA: CPT

## 2025-02-06 PROCEDURE — 96365 THER/PROPH/DIAG IV INF INIT: CPT

## 2025-02-06 PROCEDURE — 99223 1ST HOSP IP/OBS HIGH 75: CPT | Performed by: INTERNAL MEDICINE

## 2025-02-06 PROCEDURE — 2580000003 HC RX 258

## 2025-02-06 PROCEDURE — 80053 COMPREHEN METABOLIC PANEL: CPT

## 2025-02-06 PROCEDURE — 94640 AIRWAY INHALATION TREATMENT: CPT

## 2025-02-06 PROCEDURE — 96372 THER/PROPH/DIAG INJ SC/IM: CPT

## 2025-02-06 PROCEDURE — 6370000000 HC RX 637 (ALT 250 FOR IP)

## 2025-02-06 PROCEDURE — 71045 X-RAY EXAM CHEST 1 VIEW: CPT

## 2025-02-06 PROCEDURE — 83605 ASSAY OF LACTIC ACID: CPT

## 2025-02-06 PROCEDURE — 2580000003 HC RX 258: Performed by: EMERGENCY MEDICINE

## 2025-02-06 PROCEDURE — 85025 COMPLETE CBC W/AUTO DIFF WBC: CPT

## 2025-02-06 PROCEDURE — 2500000003 HC RX 250 WO HCPCS

## 2025-02-06 PROCEDURE — 83880 ASSAY OF NATRIURETIC PEPTIDE: CPT

## 2025-02-06 PROCEDURE — 83735 ASSAY OF MAGNESIUM: CPT

## 2025-02-06 PROCEDURE — 6360000002 HC RX W HCPCS: Performed by: EMERGENCY MEDICINE

## 2025-02-06 RX ORDER — MAGNESIUM SULFATE IN WATER 40 MG/ML
2000 INJECTION, SOLUTION INTRAVENOUS PRN
Status: DISCONTINUED | OUTPATIENT
Start: 2025-02-06 | End: 2025-02-12 | Stop reason: HOSPADM

## 2025-02-06 RX ORDER — PROMETHAZINE HYDROCHLORIDE 12.5 MG/1
12.5 TABLET ORAL EVERY 6 HOURS PRN
Status: DISCONTINUED | OUTPATIENT
Start: 2025-02-06 | End: 2025-02-12 | Stop reason: HOSPADM

## 2025-02-06 RX ORDER — ARFORMOTEROL TARTRATE 15 UG/2ML
15 SOLUTION RESPIRATORY (INHALATION)
Status: DISCONTINUED | OUTPATIENT
Start: 2025-02-06 | End: 2025-02-12 | Stop reason: HOSPADM

## 2025-02-06 RX ORDER — ACETAMINOPHEN 650 MG/1
650 SUPPOSITORY RECTAL EVERY 6 HOURS PRN
Status: DISCONTINUED | OUTPATIENT
Start: 2025-02-06 | End: 2025-02-12 | Stop reason: HOSPADM

## 2025-02-06 RX ORDER — SODIUM CHLORIDE 9 MG/ML
INJECTION, SOLUTION INTRAVENOUS PRN
Status: DISCONTINUED | OUTPATIENT
Start: 2025-02-06 | End: 2025-02-12 | Stop reason: HOSPADM

## 2025-02-06 RX ORDER — ONDANSETRON 2 MG/ML
4 INJECTION INTRAMUSCULAR; INTRAVENOUS EVERY 6 HOURS PRN
Status: DISCONTINUED | OUTPATIENT
Start: 2025-02-06 | End: 2025-02-12 | Stop reason: HOSPADM

## 2025-02-06 RX ORDER — BUDESONIDE 1 MG/2ML
0.5 INHALANT ORAL
Status: DISCONTINUED | OUTPATIENT
Start: 2025-02-06 | End: 2025-02-09

## 2025-02-06 RX ORDER — ASPIRIN 325 MG
325 TABLET ORAL ONCE
Status: COMPLETED | OUTPATIENT
Start: 2025-02-06 | End: 2025-02-06

## 2025-02-06 RX ORDER — BUPRENORPHINE HYDROCHLORIDE AND NALOXONE HYDROCHLORIDE DIHYDRATE 8; 2 MG/1; MG/1
1 TABLET SUBLINGUAL 2 TIMES DAILY
Status: DISCONTINUED | OUTPATIENT
Start: 2025-02-06 | End: 2025-02-12 | Stop reason: HOSPADM

## 2025-02-06 RX ORDER — NICOTINE 21 MG/24HR
1 PATCH, TRANSDERMAL 24 HOURS TRANSDERMAL DAILY
Status: DISCONTINUED | OUTPATIENT
Start: 2025-02-06 | End: 2025-02-12 | Stop reason: HOSPADM

## 2025-02-06 RX ORDER — AMOXICILLIN 500 MG/1
1000 CAPSULE ORAL 3 TIMES DAILY
Qty: 30 CAPSULE | Refills: 0 | Status: ON HOLD | OUTPATIENT
Start: 2025-02-06 | End: 2025-02-12 | Stop reason: HOSPADM

## 2025-02-06 RX ORDER — AMOXICILLIN 250 MG/1
1000 CAPSULE ORAL
Status: COMPLETED | OUTPATIENT
Start: 2025-02-06 | End: 2025-02-06

## 2025-02-06 RX ORDER — POLYETHYLENE GLYCOL 3350 17 G/17G
17 POWDER, FOR SOLUTION ORAL DAILY PRN
Status: DISCONTINUED | OUTPATIENT
Start: 2025-02-06 | End: 2025-02-12 | Stop reason: HOSPADM

## 2025-02-06 RX ORDER — HYDRALAZINE HYDROCHLORIDE 20 MG/ML
5 INJECTION INTRAMUSCULAR; INTRAVENOUS EVERY 4 HOURS PRN
Status: DISCONTINUED | OUTPATIENT
Start: 2025-02-06 | End: 2025-02-12 | Stop reason: HOSPADM

## 2025-02-06 RX ORDER — SODIUM CHLORIDE 9 MG/ML
INJECTION, SOLUTION INTRAVENOUS CONTINUOUS
Status: DISCONTINUED | OUTPATIENT
Start: 2025-02-06 | End: 2025-02-07

## 2025-02-06 RX ORDER — BUDESONIDE 1 MG/2ML
1 INHALANT ORAL
Status: DISCONTINUED | OUTPATIENT
Start: 2025-02-06 | End: 2025-02-06

## 2025-02-06 RX ORDER — POTASSIUM CHLORIDE 1500 MG/1
40 TABLET, EXTENDED RELEASE ORAL PRN
Status: DISCONTINUED | OUTPATIENT
Start: 2025-02-06 | End: 2025-02-12 | Stop reason: HOSPADM

## 2025-02-06 RX ORDER — SODIUM CHLORIDE 0.9 % (FLUSH) 0.9 %
5-40 SYRINGE (ML) INJECTION EVERY 12 HOURS SCHEDULED
Status: DISCONTINUED | OUTPATIENT
Start: 2025-02-06 | End: 2025-02-12 | Stop reason: HOSPADM

## 2025-02-06 RX ORDER — SODIUM CHLORIDE 0.9 % (FLUSH) 0.9 %
5-40 SYRINGE (ML) INJECTION PRN
Status: DISCONTINUED | OUTPATIENT
Start: 2025-02-06 | End: 2025-02-12 | Stop reason: HOSPADM

## 2025-02-06 RX ORDER — POTASSIUM CHLORIDE 7.45 MG/ML
10 INJECTION INTRAVENOUS PRN
Status: DISCONTINUED | OUTPATIENT
Start: 2025-02-06 | End: 2025-02-12 | Stop reason: HOSPADM

## 2025-02-06 RX ORDER — ENOXAPARIN SODIUM 100 MG/ML
40 INJECTION SUBCUTANEOUS DAILY
Status: DISCONTINUED | OUTPATIENT
Start: 2025-02-06 | End: 2025-02-12 | Stop reason: HOSPADM

## 2025-02-06 RX ORDER — ACETAMINOPHEN 325 MG/1
650 TABLET ORAL EVERY 6 HOURS PRN
Status: DISCONTINUED | OUTPATIENT
Start: 2025-02-06 | End: 2025-02-12

## 2025-02-06 RX ORDER — ASPIRIN 81 MG/1
81 TABLET, CHEWABLE ORAL DAILY
Status: DISCONTINUED | OUTPATIENT
Start: 2025-02-07 | End: 2025-02-12 | Stop reason: HOSPADM

## 2025-02-06 RX ADMIN — ASPIRIN 325 MG: 325 TABLET ORAL at 22:45

## 2025-02-06 RX ADMIN — ARFORMOTEROL TARTRATE 15 MCG: 15 SOLUTION RESPIRATORY (INHALATION) at 19:49

## 2025-02-06 RX ADMIN — AZITHROMYCIN MONOHYDRATE 500 MG: 500 INJECTION, POWDER, LYOPHILIZED, FOR SOLUTION INTRAVENOUS at 17:53

## 2025-02-06 RX ADMIN — WATER 1000 MG: 1 INJECTION INTRAMUSCULAR; INTRAVENOUS; SUBCUTANEOUS at 17:30

## 2025-02-06 RX ADMIN — IPRATROPIUM BROMIDE 0.5 MG: 0.5 SOLUTION RESPIRATORY (INHALATION) at 19:50

## 2025-02-06 RX ADMIN — SODIUM CHLORIDE, PRESERVATIVE FREE 10 ML: 5 INJECTION INTRAVENOUS at 22:46

## 2025-02-06 RX ADMIN — BUPRENORPHINE HYDROCHLORIDE AND NALOXONE HYDROCHLORIDE DIHYDRATE 1 TABLET: 8; 2 TABLET SUBLINGUAL at 22:45

## 2025-02-06 RX ADMIN — ENOXAPARIN SODIUM 40 MG: 100 INJECTION SUBCUTANEOUS at 18:14

## 2025-02-06 RX ADMIN — SODIUM CHLORIDE: 9 INJECTION, SOLUTION INTRAVENOUS at 18:15

## 2025-02-06 RX ADMIN — AMOXICILLIN 1000 MG: 250 CAPSULE ORAL at 00:21

## 2025-02-06 RX ADMIN — BUDESONIDE 0.5 MG: 1 SUSPENSION RESPIRATORY (INHALATION) at 19:50

## 2025-02-06 ASSESSMENT — PAIN SCALES - GENERAL
PAINLEVEL_OUTOF10: 0
PAINLEVEL_OUTOF10: 0

## 2025-02-06 ASSESSMENT — HEART SCORE: ECG: NON-SPECIFC REPOLARIZATION DISTURBANCE/LBTB/PM

## 2025-02-06 NOTE — ED PROVIDER NOTES
EMERGENCY DEPARTMENT HISTORY AND PHYSICAL EXAM    4:39 PM      Date: 2/6/2025  Patient Name: Adin Shen    History of Presenting Illness     Chief Complaint   Patient presents with   • Bradycardia       History From: Patient    Adin Shen is a 64 y.o. male   Patient is a 64-year-old male with a history of depression, COPD, cardiomegaly, bradycardia, regular cocaine use, that presents emergency department with complaint of chest pressure yesterday with nausea and not feeling well so mild shortness of breath.  Patient was diagnosed with pneumonia started on antibiotics and was told that he likely should be admitted to the hospital but he refused and went home.  Patient went home and having continued symptoms so spoke to his family and his fiancée and decided to come in for evaluation.  The patient was working previously and now has not worked since November.  The patient is active and says he does not get lightheaded when he works out but has had a syncopal event in the past 6 months.  The patient was told his heart rate was low and it was concerning and now is concerning him.  Patient is a cigarette smoker, Leslee drinks alcohol, and occasionally uses marijuana and cocaine.  Patient has not used cocaine in the past 2 days.  The patient denies any chest pressure this time and felt that last evening when he was at home.           Nursing Notes were all reviewed and agreed with or any disagreements were addressed in the HPI.    PCP: Shola Galaviz MD    No current facility-administered medications for this encounter.     Current Outpatient Medications   Medication Sig Dispense Refill   • amoxicillin (AMOXIL) 500 MG capsule Take 2 capsules by mouth 3 times daily for 10 days 30 capsule 0   • nicotine (NICODERM CQ) 21 MG/24HR Place 1 patch onto the skin daily 42 patch 0   • [START ON 2/18/2025] nicotine (NICODERM CQ) 14 MG/24HR Place 1 patch onto the skin daily for 28 days 28 patch 0   • [START ON 3/18/2025]

## 2025-02-06 NOTE — ED NOTES
Cardiologist sent to ED because of existing low heart rate in 30s. Pt seen at [facility] ED last night, but left AMA. Staff encouraged him to stay, but he did not.   Currently heart rate is in the mid 30s, unable to obtain BP.   Hx COPD, emphysema, sleep apnea, pneumonia on R lung (last night), chronic bradycardia.

## 2025-02-06 NOTE — ED NOTES
Discharge instructions reviewed with patient. Verbalized understanding. Patient ambulatory with steady gait to the ED lobby, accompanied by visitor, in no acute distress.

## 2025-02-06 NOTE — ED PROVIDER NOTES
EMERGENCY DEPARTMENT HISTORY AND PHYSICAL EXAM    4:51 PM      Date: 2/6/2025  Patient Name: Adin Shen    History of Presenting Illness     Chief Complaint   Patient presents with    Bradycardia       History From: Patient    Adin Shen is a 64 y.o. male   Patient is a 64-year-old male with a history of depression, COPD, cardiomegaly, bradycardia, regular cocaine use, that presents emergency department with complaint of chest pressure yesterday with nausea and not feeling well so mild shortness of breath.  Patient was diagnosed with pneumonia started on antibiotics and was told that he likely should be admitted to the hospital but he refused and went home.  Patient went home and having continued symptoms so spoke to his family and his fiancée and decided to come in for evaluation.  The patient was working previously and now has not worked since November.  The patient is active and says he does not get lightheaded when he works out but has had a syncopal event in the past 6 months.  The patient was told his heart rate was low and it was concerning and now is concerning him.  Patient is a cigarette smoker, Leslee drinks alcohol, and occasionally uses marijuana and cocaine.  Patient has not used cocaine in the past 2 days.  The patient denies any chest pressure this time and felt that last evening when he was at home.           Nursing Notes were all reviewed and agreed with or any disagreements were addressed in the HPI.    PCP: Shola Galaviz MD    Current Facility-Administered Medications   Medication Dose Route Frequency Provider Last Rate Last Admin    cefTRIAXone (ROCEPHIN) 1,000 mg in sterile water 10 mL IV syringe  1,000 mg IntraVENous Once Sanford Galindo MD        And    azithromycin (ZITHROMAX) 500 mg in sodium chloride 0.9 % 250 mL IVPB ([id number])  500 mg IntraVENous Once Sanford Galindo MD         Current Outpatient Medications   Medication Sig Dispense Refill    amoxicillin

## 2025-02-06 NOTE — CONSULTS
PORTABLE    CLINICAL INDICATION/HISTORY: epigastric/chest pain    TECHNIQUE: Portable AP view of the chest    COMPARISON: 10/10/2020    FINDINGS:    Patchy right mid to lower lung airspace opacity. No large pleural effusion. No  pneumothorax. Unchanged cardiomediastinal silhouette.    Impression  Patchy right mid to lower lung airspace opacities may represent pneumonia.      Electronically signed by Shawn De Oliveira      Signed By: ELPIDIO ADKINS MD     February 6, 2025

## 2025-02-06 NOTE — H&P
117 U/L    Total Protein 7.3 6.4 - 8.2 g/dL    Albumin 3.1 (L) 3.4 - 5.0 g/dL    Globulin 4.2 (H) 2.0 - 4.0 g/dL    Albumin/Globulin Ratio 0.7 (L) 0.8 - 1.7     CBC with Auto Differential    Collection Time: 02/06/25  3:40 PM   Result Value Ref Range    WBC 4.0 (L) 4.6 - 13.2 K/uL    RBC 4.93 4.35 - 5.65 M/uL    Hemoglobin 12.5 (L) 13.0 - 16.0 g/dL    Hematocrit 42.2 36.0 - 48.0 %    MCV 85.6 78.0 - 100.0 FL    MCH 25.4 24.0 - 34.0 PG    MCHC 29.6 (L) 31.0 - 37.0 g/dL    RDW 14.1 11.6 - 14.5 %    Platelets 152 135 - 420 K/uL    MPV 13.4 (H) 9.2 - 11.8 FL    Nucleated RBCs 0.0 0  WBC    nRBC 0.00 0.00 - 0.01 K/uL    Neutrophils % 40.3 40.0 - 73.0 %    Lymphocytes % 44.1 21.0 - 52.0 %    Monocytes % 12.2 (H) 3.0 - 10.0 %    Eosinophils % 2.7 0.0 - 5.0 %    Basophils % 0.7 0.0 - 2.0 %    Immature Granulocytes % 0.0 0.0 - 0.5 %    Neutrophils Absolute 1.61 (L) 1.80 - 8.00 K/UL    Lymphocytes Absolute 1.77 0.90 - 3.60 K/UL    Monocytes Absolute 0.49 0.05 - 1.20 K/UL    Eosinophils Absolute 0.11 0.00 - 0.40 K/UL    Basophils Absolute 0.03 0.00 - 0.10 K/UL    Immature Granulocytes Absolute 0.00 0.00 - 0.04 K/UL    Differential Type AUTOMATED     Magnesium    Collection Time: 02/06/25  3:40 PM   Result Value Ref Range    Magnesium 2.3 1.6 - 2.6 mg/dL   Troponin    Collection Time: 02/06/25  3:40 PM   Result Value Ref Range    Troponin, High Sensitivity 29 0 - 78 ng/L   Brain Natriuretic Peptide    Collection Time: 02/06/25  3:40 PM   Result Value Ref Range    NT Pro- 0 - 900 PG/ML   Procalcitonin    Collection Time: 02/06/25  3:40 PM   Result Value Ref Range    Procalcitonin <0.05 ng/mL       Imaging Reviewed:  XR CHEST 1 VIEW    Result Date: 2/6/2025  STUDY:  XR CHEST 1 VIEW. HISTORY: CP. COMPARISONS: Chest radiograph 2/5/2025 TECHNIQUE:  Portable AP view(s) of the chest. FINDINGS: Life support tubes/lines: None Heart and mediastinum: Nonenlarged. Lungs: When compared to prior, there has been interval

## 2025-02-06 NOTE — ED TRIAGE NOTES
Cardiologist sent to ED because of existing low heart rate in 30s. Pt seen at [facility] ED last night, but left AMA. Staff encouraged him to stay, but he did not.   Currently heart rate is in the mid 30s, unable to obtain BP.   Hx COPD, emphysema, sleep apnea, pneumonia on R lung (last night), chronic bradycardia.

## 2025-02-06 NOTE — ED PROVIDER NOTES
[facility] EMERGENCY DEPARTMENT  eMERGENCY dEPARTMENT eNCOUnter      Pt Name: Adin Shen  MRN: [id number]  Birthdate 1960 of evaluation: 2/5/2025  Provider:Javier Lee MD    CHIEF COMPLAINT       HPI    Adin Shen is a 64 y.o. male  c/o having epigastric/chest pain that started this am that has been on going x 1 days.  He has a hx of cocaine, opiate use, COPD and chronic slow heart rate. Patient states he use Suboxone daily.  He also states he uses cocaine last night.  He states he developed  epigastric/mid sternal chest pain that start this am and has been  persistent. It was a 10/10 this am and it went down to a 5/10 upon arrival to the ER.    Patient has had bradycardia for 14 years.  He was offer a pacemaker insertion 7 years ago and he has refused.    ROS    Review of Systems   Constitutional:  Positive for activity change (cocaine use). Negative for fatigue and fever.   HENT: Negative.     Eyes: Negative.    Respiratory:  Negative for shortness of breath and wheezing.    Cardiovascular:  Positive for chest pain. Negative for palpitations.   Gastrointestinal:  Positive for abdominal pain (epigastric pain). Negative for nausea and vomiting.   Genitourinary: Negative.    Musculoskeletal: Negative.    Neurological: Negative.  Negative for dizziness and weakness.   Psychiatric/Behavioral:  Negative for agitation.    All other systems reviewed and are negative.    Except as noted above the remainder of the review of systems was reviewed and negative.       PAST MEDICAL HISTORY     Past Medical History:   Diagnosis Date    Acid indigestion     Bell's palsy     Cardiomegaly     1/12 LVH on EKG    Chronic back pain     Cocaine abuse (HCC)     Drug use     GERD (gastroesophageal reflux disease)     Heroin causing adverse effect in therapeutic use     Nonspecific abnormal electrocardiogram (ECG) (EKG)     S Jcarlos, RBBB    Pulmonary nodule     Tobacco use disorder    (+) severe

## 2025-02-07 ENCOUNTER — APPOINTMENT (OUTPATIENT)
Facility: HOSPITAL | Age: 65
DRG: 816 | End: 2025-02-07
Payer: COMMERCIAL

## 2025-02-07 LAB
ANION GAP SERPL CALC-SCNC: 4 MMOL/L (ref 3–18)
BASOPHILS # BLD: 0.03 K/UL (ref 0–0.1)
BASOPHILS NFR BLD: 0.8 % (ref 0–2)
BUN SERPL-MCNC: 15 MG/DL (ref 7–18)
BUN/CREAT SERPL: 14 (ref 12–20)
CALCIUM SERPL-MCNC: 8.2 MG/DL (ref 8.5–10.1)
CHLORIDE SERPL-SCNC: 109 MMOL/L (ref 100–111)
CO2 SERPL-SCNC: 30 MMOL/L (ref 21–32)
CREAT SERPL-MCNC: 1.08 MG/DL (ref 0.6–1.3)
DIFFERENTIAL METHOD BLD: ABNORMAL
ECHO AO ASC DIAM: 3.6 CM
ECHO AO ASCENDING AORTA INDEX: 1.9 CM/M2
ECHO AO ROOT DIAM: 3.6 CM
ECHO AO ROOT INDEX: 1.9 CM/M2
ECHO AV PEAK GRADIENT: 6 MMHG
ECHO AV PEAK VELOCITY: 1.2 M/S
ECHO AV VELOCITY RATIO: 0.92
ECHO BSA: 1.9 M2
ECHO EST RA PRESSURE: 8 MMHG
ECHO LA DIAMETER INDEX: 1.53 CM/M2
ECHO LA DIAMETER: 2.9 CM
ECHO LA TO AORTIC ROOT RATIO: 0.81
ECHO LA VOL A-L A2C: 78 ML (ref 18–58)
ECHO LA VOL A-L A4C: 34 ML (ref 18–58)
ECHO LA VOL BP: 53 ML (ref 18–58)
ECHO LA VOL MOD A2C: 77 ML (ref 18–58)
ECHO LA VOL MOD A4C: 28 ML (ref 18–58)
ECHO LA VOL/BSA BIPLANE: 28 ML/M2 (ref 16–34)
ECHO LA VOLUME AREA LENGTH: 60 ML
ECHO LA VOLUME INDEX A-L A2C: 41 ML/M2 (ref 16–34)
ECHO LA VOLUME INDEX A-L A4C: 18 ML/M2 (ref 16–34)
ECHO LA VOLUME INDEX AREA LENGTH: 32 ML/M2 (ref 16–34)
ECHO LA VOLUME INDEX MOD A2C: 41 ML/M2 (ref 16–34)
ECHO LA VOLUME INDEX MOD A4C: 15 ML/M2 (ref 16–34)
ECHO LV E' LATERAL VELOCITY: 5.65 CM/S
ECHO LV E' SEPTAL VELOCITY: 7.1 CM/S
ECHO LV EDV A2C: 145 ML
ECHO LV EDV A4C: 152 ML
ECHO LV EDV BP: 148 ML (ref 67–155)
ECHO LV EDV INDEX A4C: 80 ML/M2
ECHO LV EDV INDEX BP: 78 ML/M2
ECHO LV EDV NDEX A2C: 77 ML/M2
ECHO LV EJECTION FRACTION A2C: 56 %
ECHO LV EJECTION FRACTION A4C: 56 %
ECHO LV EJECTION FRACTION BIPLANE: 55 % (ref 55–100)
ECHO LV ESV A2C: 64 ML
ECHO LV ESV A4C: 66 ML
ECHO LV ESV BP: 67 ML (ref 22–58)
ECHO LV ESV INDEX A2C: 34 ML/M2
ECHO LV ESV INDEX A4C: 35 ML/M2
ECHO LV ESV INDEX BP: 35 ML/M2
ECHO LV FRACTIONAL SHORTENING: 27 % (ref 28–44)
ECHO LV INTERNAL DIMENSION DIASTOLE INDEX: 2.7 CM/M2
ECHO LV INTERNAL DIMENSION DIASTOLIC: 5.1 CM (ref 4.2–5.9)
ECHO LV INTERNAL DIMENSION SYSTOLIC INDEX: 1.96 CM/M2
ECHO LV INTERNAL DIMENSION SYSTOLIC: 3.7 CM
ECHO LV IVSD: 1.2 CM (ref 0.6–1)
ECHO LV MASS 2D: 241.2 G (ref 88–224)
ECHO LV MASS INDEX 2D: 127.6 G/M2 (ref 49–115)
ECHO LV POSTERIOR WALL DIASTOLIC: 1.2 CM (ref 0.6–1)
ECHO LV RELATIVE WALL THICKNESS RATIO: 0.47
ECHO LVOT PEAK GRADIENT: 5 MMHG
ECHO LVOT PEAK VELOCITY: 1.1 M/S
ECHO MV A VELOCITY: 0.31 M/S
ECHO MV E DECELERATION TIME (DT): 172.5 MS
ECHO MV E VELOCITY: 0.5 M/S
ECHO MV E/A RATIO: 1.61
ECHO MV E/E' LATERAL: 8.85
ECHO MV E/E' RATIO (AVERAGED): 7.95
ECHO MV E/E' SEPTAL: 7.04
ECHO PV MAX VELOCITY: 0.8 M/S
ECHO PV PEAK GRADIENT: 3 MMHG
ECHO RA VOLUME BIPLANE METHOD OF DISKS: 41 ML
ECHO RA VOLUME INDEX BP: 22 ML/M2
ECHO RA VOLUME: 41 ML
ECHO RA VOLUME: 45 ML
ECHO RIGHT VENTRICULAR SYSTOLIC PRESSURE (RVSP): 30 MMHG
ECHO RV BASAL DIMENSION: 3.8 CM
ECHO RV FREE WALL PEAK S': 10.3 CM/S
ECHO RV TAPSE: 1.8 CM (ref 1.7–?)
ECHO TV REGURGITANT MAX VELOCITY: 2.33 M/S
ECHO TV REGURGITANT PEAK GRADIENT: 22 MMHG
EOSINOPHIL # BLD: 0.17 K/UL (ref 0–0.4)
EOSINOPHIL NFR BLD: 4.3 % (ref 0–5)
ERYTHROCYTE [DISTWIDTH] IN BLOOD BY AUTOMATED COUNT: 14.1 % (ref 11.6–14.5)
GLUCOSE SERPL-MCNC: 108 MG/DL (ref 74–99)
HCT VFR BLD AUTO: 39.4 % (ref 36–48)
HGB BLD-MCNC: 12.1 G/DL (ref 13–16)
IMM GRANULOCYTES # BLD AUTO: 0.01 K/UL (ref 0–0.04)
IMM GRANULOCYTES NFR BLD AUTO: 0.3 % (ref 0–0.5)
LYMPHOCYTES # BLD: 2.32 K/UL (ref 0.9–3.6)
LYMPHOCYTES NFR BLD: 58.9 % (ref 21–52)
MCH RBC QN AUTO: 26.4 PG (ref 24–34)
MCHC RBC AUTO-ENTMCNC: 30.7 G/DL (ref 31–37)
MCV RBC AUTO: 85.8 FL (ref 78–100)
MONOCYTES # BLD: 0.39 K/UL (ref 0.05–1.2)
MONOCYTES NFR BLD: 9.9 % (ref 3–10)
NEUTS SEG # BLD: 1.02 K/UL (ref 1.8–8)
NEUTS SEG NFR BLD: 25.8 % (ref 40–73)
NRBC # BLD: 0 K/UL (ref 0–0.01)
NRBC BLD-RTO: 0 PER 100 WBC
PLATELET # BLD AUTO: 130 K/UL (ref 135–420)
PMV BLD AUTO: 14.1 FL (ref 9.2–11.8)
POTASSIUM SERPL-SCNC: 3.8 MMOL/L (ref 3.5–5.5)
RBC # BLD AUTO: 4.59 M/UL (ref 4.35–5.65)
SODIUM SERPL-SCNC: 143 MMOL/L (ref 136–145)
TSH SERPL DL<=0.05 MIU/L-ACNC: 0.92 UIU/ML (ref 0.36–3.74)
WBC # BLD AUTO: 3.9 K/UL (ref 4.6–13.2)

## 2025-02-07 PROCEDURE — 94640 AIRWAY INHALATION TREATMENT: CPT

## 2025-02-07 PROCEDURE — 84443 ASSAY THYROID STIM HORMONE: CPT

## 2025-02-07 PROCEDURE — 80048 BASIC METABOLIC PNL TOTAL CA: CPT

## 2025-02-07 PROCEDURE — 85025 COMPLETE CBC W/AUTO DIFF WBC: CPT

## 2025-02-07 PROCEDURE — 2500000003 HC RX 250 WO HCPCS

## 2025-02-07 PROCEDURE — 99222 1ST HOSP IP/OBS MODERATE 55: CPT | Performed by: INTERNAL MEDICINE

## 2025-02-07 PROCEDURE — 6360000002 HC RX W HCPCS

## 2025-02-07 PROCEDURE — G0378 HOSPITAL OBSERVATION PER HR: HCPCS

## 2025-02-07 PROCEDURE — 99232 SBSQ HOSP IP/OBS MODERATE 35: CPT | Performed by: STUDENT IN AN ORGANIZED HEALTH CARE EDUCATION/TRAINING PROGRAM

## 2025-02-07 PROCEDURE — 2580000003 HC RX 258

## 2025-02-07 PROCEDURE — 93306 TTE W/DOPPLER COMPLETE: CPT

## 2025-02-07 PROCEDURE — 94761 N-INVAS EAR/PLS OXIMETRY MLT: CPT

## 2025-02-07 PROCEDURE — 93306 TTE W/DOPPLER COMPLETE: CPT | Performed by: INTERNAL MEDICINE

## 2025-02-07 PROCEDURE — 6370000000 HC RX 637 (ALT 250 FOR IP)

## 2025-02-07 PROCEDURE — 36415 COLL VENOUS BLD VENIPUNCTURE: CPT

## 2025-02-07 RX ADMIN — IPRATROPIUM BROMIDE 0.5 MG: 0.5 SOLUTION RESPIRATORY (INHALATION) at 12:08

## 2025-02-07 RX ADMIN — IPRATROPIUM BROMIDE 0.5 MG: 0.5 SOLUTION RESPIRATORY (INHALATION) at 16:42

## 2025-02-07 RX ADMIN — SODIUM CHLORIDE, PRESERVATIVE FREE 10 ML: 5 INJECTION INTRAVENOUS at 21:00

## 2025-02-07 RX ADMIN — SODIUM CHLORIDE, PRESERVATIVE FREE 10 ML: 5 INJECTION INTRAVENOUS at 10:58

## 2025-02-07 RX ADMIN — SODIUM CHLORIDE: 9 INJECTION, SOLUTION INTRAVENOUS at 07:57

## 2025-02-07 RX ADMIN — BUPRENORPHINE HYDROCHLORIDE AND NALOXONE HYDROCHLORIDE DIHYDRATE 1 TABLET: 8; 2 TABLET SUBLINGUAL at 21:00

## 2025-02-07 RX ADMIN — IPRATROPIUM BROMIDE 0.5 MG: 0.5 SOLUTION RESPIRATORY (INHALATION) at 07:58

## 2025-02-07 RX ADMIN — ENOXAPARIN SODIUM 40 MG: 100 INJECTION SUBCUTANEOUS at 13:23

## 2025-02-07 RX ADMIN — ARFORMOTEROL TARTRATE 15 MCG: 15 SOLUTION RESPIRATORY (INHALATION) at 07:58

## 2025-02-07 RX ADMIN — BUPRENORPHINE HYDROCHLORIDE AND NALOXONE HYDROCHLORIDE DIHYDRATE 1 TABLET: 8; 2 TABLET SUBLINGUAL at 10:52

## 2025-02-07 RX ADMIN — WATER 1000 MG: 1 INJECTION INTRAMUSCULAR; INTRAVENOUS; SUBCUTANEOUS at 16:23

## 2025-02-07 RX ADMIN — AZITHROMYCIN MONOHYDRATE 250 MG: 500 INJECTION, POWDER, LYOPHILIZED, FOR SOLUTION INTRAVENOUS at 16:30

## 2025-02-07 RX ADMIN — BUDESONIDE 0.5 MG: 1 SUSPENSION RESPIRATORY (INHALATION) at 07:58

## 2025-02-07 RX ADMIN — ASPIRIN 81 MG CHEWABLE TABLET 81 MG: 81 TABLET CHEWABLE at 10:52

## 2025-02-07 ASSESSMENT — PAIN SCALES - GENERAL
PAINLEVEL_OUTOF10: 0

## 2025-02-07 NOTE — PLAN OF CARE
Problem: Discharge Planning  Goal: Discharge to home or other facility with appropriate resources  Outcome: Progressing  Flowsheets (Taken 2/7/2025 1818)  Discharge to home or other facility with appropriate resources:   Identify barriers to discharge with patient and caregiver   Arrange for needed discharge resources and transportation as appropriate   Identify discharge learning needs (meds, wound care, etc)     Problem: Safety - Adult  Goal: Free from fall injury  2/7/2025 1818 by Shantal White RN  Outcome: Progressing  Flowsheets (Taken 2/7/2025 1818)  Free From Fall Injury: Instruct family/caregiver on patient safety     Problem: ABCDS Injury Assessment  Goal: Absence of physical injury  Outcome: Progressing  Flowsheets (Taken 2/7/2025 1818)  Absence of Physical Injury: Implement safety measures based on patient assessment     Problem: Cardiovascular - Adult  Goal: Maintains optimal cardiac output and hemodynamic stability  Outcome: Progressing  Flowsheets (Taken 2/7/2025 1818)  Maintains optimal cardiac output and hemodynamic stability:   Monitor blood pressure and heart rate   Monitor urine output and notify Licensed Independent Practitioner for values outside of normal range   Assess for signs of decreased cardiac output   Administer fluid and/or volume expanders as ordered     Problem: Cardiovascular - Adult  Goal: Absence of cardiac dysrhythmias or at baseline  Outcome: Progressing  Flowsheets (Taken 2/7/2025 1818)  Absence of cardiac dysrhythmias or at baseline:   Monitor cardiac rate and rhythm   Assess for signs of decreased cardiac output

## 2025-02-07 NOTE — CARE COORDINATION
Met with patient in room. HIPAA verified. Initial case management admission assessment completed with patient's permission. Patient Demographics verified. Patient's preference for disposition at discharge is home with family. Patient states his fiance will transport him home at time of discharge.       02/07/25 0913   Service Assessment   Patient Orientation Alert and Oriented   Cognition Alert   History Provided By Patient   Primary Caregiver Self   Accompanied By/Relationship No one at this time.   Support Systems Spouse/Significant Other;Family Members   Patient's Healthcare Decision Maker is: Legal Next of Kin   PCP Verified by CM No  (Patient could not remember his MD name)   Prior Functional Level Independent in ADLs/IADLs   Current Functional Level Independent in ADLs/IADLs   Can patient return to prior living arrangement Yes   Ability to make needs known: Good   Family able to assist with home care needs: Yes   Would you like for me to discuss the discharge plan with any other family members/significant others, and if so, who? Yes  (significant other)   Financial Resources Medicaid   Community Resources None   Social/Functional History   Lives With Family   Type of Home Apartment   Home Layout One level   Home Access Level entry   Bathroom Shower/Tub Tub/Shower unit   Bathroom Toilet Standard   Bathroom Equipment None   Bathroom Accessibility Accessible   Home Equipment None   Prior Level of Assist for ADLs Independent   Prior Level of Assist for Homemaking Independent   Homemaking Responsibilities Yes   Ambulation Assistance Independent   Prior Level of Assist for Transfers Independent   Active  Yes   Mode of Transportation Car   Occupation Unemployed   Type of Occupation none   Discharge Planning   Type of Residence Apartment   Living Arrangements Family Members  (Patient states he lives with his sister.)   Current Services Prior To Admission None   Potential Assistance Needed N/A   DME Ordered? No

## 2025-02-07 NOTE — CARE COORDINATION
02/07/25 1206   IMM Letter   Observation Status Letter date given: 02/07/25   Observation Status Letter time given: 1206   Observation Status Letter given to Patient/Family/Significant other/Guardian/POA/by: Kacy Deleon BSN,RN, [facility]  Case Management  [phone]

## 2025-02-08 LAB
ANION GAP SERPL CALC-SCNC: 3 MMOL/L (ref 3–18)
BASOPHILS # BLD: 0.05 K/UL (ref 0–0.1)
BASOPHILS NFR BLD: 1.1 % (ref 0–2)
BUN SERPL-MCNC: 14 MG/DL (ref 7–18)
BUN/CREAT SERPL: 12 (ref 12–20)
CALCIUM SERPL-MCNC: 8.2 MG/DL (ref 8.5–10.1)
CHLORIDE SERPL-SCNC: 111 MMOL/L (ref 100–111)
CO2 SERPL-SCNC: 29 MMOL/L (ref 21–32)
CREAT SERPL-MCNC: 1.18 MG/DL (ref 0.6–1.3)
DIFFERENTIAL METHOD BLD: ABNORMAL
EOSINOPHIL # BLD: 0.2 K/UL (ref 0–0.4)
EOSINOPHIL NFR BLD: 4.5 % (ref 0–5)
ERYTHROCYTE [DISTWIDTH] IN BLOOD BY AUTOMATED COUNT: 14.1 % (ref 11.6–14.5)
GLUCOSE SERPL-MCNC: 121 MG/DL (ref 74–99)
HCT VFR BLD AUTO: 40.2 % (ref 36–48)
HGB BLD-MCNC: 12.3 G/DL (ref 13–16)
IMM GRANULOCYTES # BLD AUTO: 0.01 K/UL (ref 0–0.04)
IMM GRANULOCYTES NFR BLD AUTO: 0.2 % (ref 0–0.5)
LYMPHOCYTES # BLD: 2.33 K/UL (ref 0.9–3.6)
LYMPHOCYTES NFR BLD: 52.7 % (ref 21–52)
MCH RBC QN AUTO: 26.2 PG (ref 24–34)
MCHC RBC AUTO-ENTMCNC: 30.6 G/DL (ref 31–37)
MCV RBC AUTO: 85.7 FL (ref 78–100)
MONOCYTES # BLD: 0.36 K/UL (ref 0.05–1.2)
MONOCYTES NFR BLD: 8.1 % (ref 3–10)
NEUTS SEG # BLD: 1.47 K/UL (ref 1.8–8)
NEUTS SEG NFR BLD: 33.4 % (ref 40–73)
NRBC # BLD: 0 K/UL (ref 0–0.01)
NRBC BLD-RTO: 0 PER 100 WBC
PLATELET # BLD AUTO: 131 K/UL (ref 135–420)
PMV BLD AUTO: 13.4 FL (ref 9.2–11.8)
POTASSIUM SERPL-SCNC: 3.8 MMOL/L (ref 3.5–5.5)
RBC # BLD AUTO: 4.69 M/UL (ref 4.35–5.65)
SODIUM SERPL-SCNC: 143 MMOL/L (ref 136–145)
WBC # BLD AUTO: 4.4 K/UL (ref 4.6–13.2)

## 2025-02-08 PROCEDURE — G0378 HOSPITAL OBSERVATION PER HR: HCPCS

## 2025-02-08 PROCEDURE — 6370000000 HC RX 637 (ALT 250 FOR IP)

## 2025-02-08 PROCEDURE — 99232 SBSQ HOSP IP/OBS MODERATE 35: CPT | Performed by: STUDENT IN AN ORGANIZED HEALTH CARE EDUCATION/TRAINING PROGRAM

## 2025-02-08 PROCEDURE — 80048 BASIC METABOLIC PNL TOTAL CA: CPT

## 2025-02-08 PROCEDURE — 2700000000 HC OXYGEN THERAPY PER DAY

## 2025-02-08 PROCEDURE — 85025 COMPLETE CBC W/AUTO DIFF WBC: CPT

## 2025-02-08 PROCEDURE — 2500000003 HC RX 250 WO HCPCS

## 2025-02-08 PROCEDURE — 2580000003 HC RX 258

## 2025-02-08 PROCEDURE — 94761 N-INVAS EAR/PLS OXIMETRY MLT: CPT

## 2025-02-08 PROCEDURE — 6360000002 HC RX W HCPCS

## 2025-02-08 PROCEDURE — 94640 AIRWAY INHALATION TREATMENT: CPT

## 2025-02-08 PROCEDURE — 36415 COLL VENOUS BLD VENIPUNCTURE: CPT

## 2025-02-08 RX ADMIN — AZITHROMYCIN MONOHYDRATE 250 MG: 500 INJECTION, POWDER, LYOPHILIZED, FOR SOLUTION INTRAVENOUS at 17:37

## 2025-02-08 RX ADMIN — SODIUM CHLORIDE, PRESERVATIVE FREE 10 ML: 5 INJECTION INTRAVENOUS at 21:00

## 2025-02-08 RX ADMIN — IPRATROPIUM BROMIDE 0.5 MG: 0.5 SOLUTION RESPIRATORY (INHALATION) at 19:22

## 2025-02-08 RX ADMIN — ACETAMINOPHEN 650 MG: 325 TABLET ORAL at 22:57

## 2025-02-08 RX ADMIN — ARFORMOTEROL TARTRATE 15 MCG: 15 SOLUTION RESPIRATORY (INHALATION) at 19:22

## 2025-02-08 RX ADMIN — BUDESONIDE 0.5 MG: 1 SUSPENSION RESPIRATORY (INHALATION) at 07:30

## 2025-02-08 RX ADMIN — ASPIRIN 81 MG CHEWABLE TABLET 81 MG: 81 TABLET CHEWABLE at 08:46

## 2025-02-08 RX ADMIN — ARFORMOTEROL TARTRATE 15 MCG: 15 SOLUTION RESPIRATORY (INHALATION) at 09:53

## 2025-02-08 RX ADMIN — WATER 1000 MG: 1 INJECTION INTRAMUSCULAR; INTRAVENOUS; SUBCUTANEOUS at 17:33

## 2025-02-08 RX ADMIN — SODIUM CHLORIDE, PRESERVATIVE FREE 10 ML: 5 INJECTION INTRAVENOUS at 08:47

## 2025-02-08 RX ADMIN — BUPRENORPHINE HYDROCHLORIDE AND NALOXONE HYDROCHLORIDE DIHYDRATE 1 TABLET: 8; 2 TABLET SUBLINGUAL at 22:55

## 2025-02-08 RX ADMIN — BUPRENORPHINE HYDROCHLORIDE AND NALOXONE HYDROCHLORIDE DIHYDRATE 1 TABLET: 8; 2 TABLET SUBLINGUAL at 08:46

## 2025-02-08 RX ADMIN — IPRATROPIUM BROMIDE 0.5 MG: 0.5 SOLUTION RESPIRATORY (INHALATION) at 09:49

## 2025-02-08 RX ADMIN — BUDESONIDE 0.5 MG: 1 SUSPENSION RESPIRATORY (INHALATION) at 19:22

## 2025-02-08 ASSESSMENT — PAIN SCALES - WONG BAKER
WONGBAKER_NUMERICALRESPONSE: NO HURT

## 2025-02-08 ASSESSMENT — PAIN SCALES - GENERAL
PAINLEVEL_OUTOF10: 0
PAINLEVEL_OUTOF10: 3
PAINLEVEL_OUTOF10: 0
PAINLEVEL_OUTOF10: 0

## 2025-02-08 ASSESSMENT — PAIN DESCRIPTION - DESCRIPTORS: DESCRIPTORS: ACHING

## 2025-02-08 ASSESSMENT — PAIN DESCRIPTION - LOCATION: LOCATION: HEAD

## 2025-02-08 NOTE — PLAN OF CARE
Problem: Discharge Planning  Goal: Discharge to home or other facility with appropriate resources  Outcome: Progressing  Flowsheets (Taken 2/8/2025 0800)  Discharge to home or other facility with appropriate resources: Identify barriers to discharge with patient and caregiver     Problem: Pain  Goal: Verbalizes/displays adequate comfort level or baseline comfort level  Outcome: Progressing     Problem: Safety - Adult  Goal: Free from fall injury  Outcome: Progressing  Flowsheets (Taken 2/8/2025 1015)  Free From Fall Injury: Instruct family/caregiver on patient safety     Problem: ABCDS Injury Assessment  Goal: Absence of physical injury  Outcome: Progressing     Problem: Cardiovascular - Adult  Goal: Maintains optimal cardiac output and hemodynamic stability  Outcome: Progressing  Flowsheets (Taken 2/8/2025 0800)  Maintains optimal cardiac output and hemodynamic stability: Monitor blood pressure and heart rate  Goal: Absence of cardiac dysrhythmias or at baseline  Outcome: Progressing  Flowsheets (Taken 2/8/2025 0800)  Absence of cardiac dysrhythmias or at baseline: Monitor cardiac rate and rhythm

## 2025-02-08 NOTE — PLAN OF CARE
Problem: Discharge Planning  Goal: Discharge to home or other facility with appropriate resources  2/8/2025 0408 by Najma Chua RN  Outcome: Progressing  2/7/2025 1818 by Shantal White RN  Outcome: Progressing  Flowsheets  Taken 2/7/2025 1818  Discharge to home or other facility with appropriate resources:   Identify barriers to discharge with patient and caregiver   Arrange for needed discharge resources and transportation as appropriate   Identify discharge learning needs (meds, wound care, etc)  Taken 2/7/2025 0759  Discharge to home or other facility with appropriate resources:   Identify barriers to discharge with patient and caregiver   Arrange for needed discharge resources and transportation as appropriate   Identify discharge learning needs (meds, wound care, etc)     Problem: Pain  Goal: Verbalizes/displays adequate comfort level or baseline comfort level  2/8/2025 0408 by Najma Chua RN  Outcome: Progressing  2/7/2025 1818 by Shantal White RN  Outcome: Progressing     Problem: Safety - Adult  Goal: Free from fall injury  2/8/2025 0408 by Najma Chua RN  Outcome: Progressing  2/7/2025 1818 by Shantal White RN  Outcome: Progressing  Flowsheets (Taken 2/7/2025 1818)  Free From Fall Injury: Instruct family/caregiver on patient safety     Problem: ABCDS Injury Assessment  Goal: Absence of physical injury  2/8/2025 0408 by Najma Chua RN  Outcome: Progressing  2/7/2025 1818 by Shantal White RN  Outcome: Progressing  Flowsheets (Taken 2/7/2025 1818)  Absence of Physical Injury: Implement safety measures based on patient assessment     Problem: Cardiovascular - Adult  Goal: Maintains optimal cardiac output and hemodynamic stability  2/8/2025 0408 by Najma Chua RN  Outcome: Progressing  2/7/2025 1818 by Shantal White RN  Outcome: Progressing  Flowsheets  Taken 2/7/2025 1818  Maintains optimal cardiac output and hemodynamic stability:   Monitor blood pressure and

## 2025-02-09 PROBLEM — R00.1 BRADYCARDIA: Status: ACTIVE | Noted: 2025-02-09

## 2025-02-09 LAB
ANION GAP SERPL CALC-SCNC: 4 MMOL/L (ref 3–18)
BASOPHILS # BLD: 0.06 K/UL (ref 0–0.1)
BASOPHILS NFR BLD: 1.1 % (ref 0–2)
BUN SERPL-MCNC: 17 MG/DL (ref 7–18)
BUN/CREAT SERPL: 16 (ref 12–20)
CALCIUM SERPL-MCNC: 8.6 MG/DL (ref 8.5–10.1)
CHLORIDE SERPL-SCNC: 110 MMOL/L (ref 100–111)
CO2 SERPL-SCNC: 29 MMOL/L (ref 21–32)
CREAT SERPL-MCNC: 1.08 MG/DL (ref 0.6–1.3)
DIFFERENTIAL METHOD BLD: ABNORMAL
EOSINOPHIL # BLD: 0.16 K/UL (ref 0–0.4)
EOSINOPHIL NFR BLD: 3.1 % (ref 0–5)
ERYTHROCYTE [DISTWIDTH] IN BLOOD BY AUTOMATED COUNT: 13.9 % (ref 11.6–14.5)
GLUCOSE SERPL-MCNC: 103 MG/DL (ref 74–99)
HCT VFR BLD AUTO: 40.5 % (ref 36–48)
HGB BLD-MCNC: 12.2 G/DL (ref 13–16)
IMM GRANULOCYTES # BLD AUTO: 0.02 K/UL (ref 0–0.04)
IMM GRANULOCYTES NFR BLD AUTO: 0.4 % (ref 0–0.5)
LYMPHOCYTES # BLD: 2.33 K/UL (ref 0.9–3.6)
LYMPHOCYTES NFR BLD: 44.5 % (ref 21–52)
MCH RBC QN AUTO: 25.6 PG (ref 24–34)
MCHC RBC AUTO-ENTMCNC: 30.1 G/DL (ref 31–37)
MCV RBC AUTO: 84.9 FL (ref 78–100)
MONOCYTES # BLD: 0.26 K/UL (ref 0.05–1.2)
MONOCYTES NFR BLD: 5 % (ref 3–10)
NEUTS SEG # BLD: 2.41 K/UL (ref 1.8–8)
NEUTS SEG NFR BLD: 45.9 % (ref 40–73)
NRBC # BLD: 0 K/UL (ref 0–0.01)
NRBC BLD-RTO: 0 PER 100 WBC
PLATELET # BLD AUTO: 148 K/UL (ref 135–420)
PMV BLD AUTO: 14.2 FL (ref 9.2–11.8)
POTASSIUM SERPL-SCNC: 3.8 MMOL/L (ref 3.5–5.5)
RBC # BLD AUTO: 4.77 M/UL (ref 4.35–5.65)
SODIUM SERPL-SCNC: 143 MMOL/L (ref 136–145)
WBC # BLD AUTO: 5.2 K/UL (ref 4.6–13.2)

## 2025-02-09 PROCEDURE — 6360000002 HC RX W HCPCS: Performed by: STUDENT IN AN ORGANIZED HEALTH CARE EDUCATION/TRAINING PROGRAM

## 2025-02-09 PROCEDURE — 1100000000 HC RM PRIVATE

## 2025-02-09 PROCEDURE — G0378 HOSPITAL OBSERVATION PER HR: HCPCS

## 2025-02-09 PROCEDURE — 2500000003 HC RX 250 WO HCPCS

## 2025-02-09 PROCEDURE — 85025 COMPLETE CBC W/AUTO DIFF WBC: CPT

## 2025-02-09 PROCEDURE — 99232 SBSQ HOSP IP/OBS MODERATE 35: CPT | Performed by: STUDENT IN AN ORGANIZED HEALTH CARE EDUCATION/TRAINING PROGRAM

## 2025-02-09 PROCEDURE — 36415 COLL VENOUS BLD VENIPUNCTURE: CPT

## 2025-02-09 PROCEDURE — 2580000003 HC RX 258

## 2025-02-09 PROCEDURE — 6360000002 HC RX W HCPCS

## 2025-02-09 PROCEDURE — 80048 BASIC METABOLIC PNL TOTAL CA: CPT

## 2025-02-09 PROCEDURE — 6370000000 HC RX 637 (ALT 250 FOR IP)

## 2025-02-09 PROCEDURE — 94640 AIRWAY INHALATION TREATMENT: CPT

## 2025-02-09 RX ORDER — BUDESONIDE 0.5 MG/2ML
0.5 INHALANT ORAL
Status: DISCONTINUED | OUTPATIENT
Start: 2025-02-09 | End: 2025-02-12 | Stop reason: HOSPADM

## 2025-02-09 RX ORDER — BUDESONIDE 1 MG/2ML
1 INHALANT ORAL
Status: DISCONTINUED | OUTPATIENT
Start: 2025-02-09 | End: 2025-02-09

## 2025-02-09 RX ADMIN — BUPRENORPHINE HYDROCHLORIDE AND NALOXONE HYDROCHLORIDE DIHYDRATE 1 TABLET: 8; 2 TABLET SUBLINGUAL at 10:13

## 2025-02-09 RX ADMIN — ARFORMOTEROL TARTRATE 15 MCG: 15 SOLUTION RESPIRATORY (INHALATION) at 07:43

## 2025-02-09 RX ADMIN — IPRATROPIUM BROMIDE 0.5 MG: 0.5 SOLUTION RESPIRATORY (INHALATION) at 07:43

## 2025-02-09 RX ADMIN — WATER 1000 MG: 1 INJECTION INTRAMUSCULAR; INTRAVENOUS; SUBCUTANEOUS at 17:36

## 2025-02-09 RX ADMIN — SODIUM CHLORIDE, PRESERVATIVE FREE 10 ML: 5 INJECTION INTRAVENOUS at 21:30

## 2025-02-09 RX ADMIN — BUDESONIDE 0.5 MG: 1 SUSPENSION RESPIRATORY (INHALATION) at 07:43

## 2025-02-09 RX ADMIN — BUDESONIDE 500 MCG: 0.5 INHALANT RESPIRATORY (INHALATION) at 19:56

## 2025-02-09 RX ADMIN — AZITHROMYCIN MONOHYDRATE 250 MG: 500 INJECTION, POWDER, LYOPHILIZED, FOR SOLUTION INTRAVENOUS at 17:45

## 2025-02-09 RX ADMIN — ARFORMOTEROL TARTRATE 15 MCG: 15 SOLUTION RESPIRATORY (INHALATION) at 19:55

## 2025-02-09 RX ADMIN — IPRATROPIUM BROMIDE 0.5 MG: 0.5 SOLUTION RESPIRATORY (INHALATION) at 19:55

## 2025-02-09 RX ADMIN — BUPRENORPHINE HYDROCHLORIDE AND NALOXONE HYDROCHLORIDE DIHYDRATE 1 TABLET: 8; 2 TABLET SUBLINGUAL at 21:34

## 2025-02-09 RX ADMIN — ENOXAPARIN SODIUM 40 MG: 100 INJECTION SUBCUTANEOUS at 10:13

## 2025-02-09 RX ADMIN — SODIUM CHLORIDE, PRESERVATIVE FREE 10 ML: 5 INJECTION INTRAVENOUS at 10:14

## 2025-02-09 RX ADMIN — ASPIRIN 81 MG CHEWABLE TABLET 81 MG: 81 TABLET CHEWABLE at 10:13

## 2025-02-09 ASSESSMENT — PAIN SCALES - WONG BAKER
WONGBAKER_NUMERICALRESPONSE: NO HURT

## 2025-02-09 ASSESSMENT — PAIN SCALES - GENERAL
PAINLEVEL_OUTOF10: 0

## 2025-02-09 NOTE — PLAN OF CARE
Problem: Discharge Planning  Goal: Discharge to home or other facility with appropriate resources  Outcome: Progressing     Problem: Discharge Planning  Goal: Discharge to home or other facility with appropriate resources  Outcome: Progressing     Problem: Pain  Goal: Verbalizes/displays adequate comfort level or baseline comfort level  Outcome: Progressing     Problem: Safety - Adult  Goal: Free from fall injury  Outcome: Progressing     Problem: ABCDS Injury Assessment  Goal: Absence of physical injury  Outcome: Progressing     Problem: Cardiovascular - Adult  Goal: Maintains optimal cardiac output and hemodynamic stability  Outcome: Progressing     Problem: Cardiovascular - Adult  Goal: Absence of cardiac dysrhythmias or at baseline  Outcome: Progressing

## 2025-02-09 NOTE — PLAN OF CARE
Problem: Discharge Planning  Goal: Discharge to home or other facility with appropriate resources  2/8/2025 2135 by Najma Chua RN  Outcome: Progressing  2/8/2025 1812 by Carissa Samuel RN  Outcome: Progressing  Flowsheets (Taken 2/8/2025 0800)  Discharge to home or other facility with appropriate resources: Identify barriers to discharge with patient and caregiver     Problem: Pain  Goal: Verbalizes/displays adequate comfort level or baseline comfort level  2/8/2025 2135 by Najma Chua RN  Outcome: Progressing  2/8/2025 1812 by Carissa Samuel RN  Outcome: Progressing     Problem: Safety - Adult  Goal: Free from fall injury  2/8/2025 2135 by Najma Chua RN  Outcome: Progressing  2/8/2025 1812 by Carissa Samuel RN  Outcome: Progressing  Flowsheets (Taken 2/8/2025 1015)  Free From Fall Injury: Instruct family/caregiver on patient safety     Problem: ABCDS Injury Assessment  Goal: Absence of physical injury  2/8/2025 2135 by Najma Chua RN  Outcome: Progressing  2/8/2025 1812 by Carissa Samuel RN  Outcome: Progressing     Problem: Cardiovascular - Adult  Goal: Maintains optimal cardiac output and hemodynamic stability  2/8/2025 2135 by Najma Chua RN  Outcome: Progressing  2/8/2025 1812 by Carissa Samuel RN  Outcome: Progressing  Flowsheets (Taken 2/8/2025 0800)  Maintains optimal cardiac output and hemodynamic stability: Monitor blood pressure and heart rate  Goal: Absence of cardiac dysrhythmias or at baseline  2/8/2025 2135 by Najma Chua RN  Outcome: Progressing  2/8/2025 1812 by Carissa Samuel RN  Outcome: Progressing  Flowsheets (Taken 2/8/2025 0800)  Absence of cardiac dysrhythmias or at baseline: Monitor cardiac rate and rhythm

## 2025-02-10 ENCOUNTER — APPOINTMENT (OUTPATIENT)
Facility: HOSPITAL | Age: 65
DRG: 816 | End: 2025-02-10
Payer: COMMERCIAL

## 2025-02-10 LAB
ANION GAP SERPL CALC-SCNC: 5 MMOL/L (ref 3–18)
BASOPHILS # BLD: 0.04 K/UL (ref 0–0.1)
BASOPHILS NFR BLD: 0.7 % (ref 0–2)
BUN SERPL-MCNC: 15 MG/DL (ref 7–18)
BUN/CREAT SERPL: 13 (ref 12–20)
CALCIUM SERPL-MCNC: 8.3 MG/DL (ref 8.5–10.1)
CHLORIDE SERPL-SCNC: 110 MMOL/L (ref 100–111)
CO2 SERPL-SCNC: 29 MMOL/L (ref 21–32)
CREAT SERPL-MCNC: 1.13 MG/DL (ref 0.6–1.3)
DIFFERENTIAL METHOD BLD: ABNORMAL
ECHO BSA: 1.9 M2
EOSINOPHIL # BLD: 0.22 K/UL (ref 0–0.4)
EOSINOPHIL NFR BLD: 3.8 % (ref 0–5)
ERYTHROCYTE [DISTWIDTH] IN BLOOD BY AUTOMATED COUNT: 13.9 % (ref 11.6–14.5)
GLUCOSE SERPL-MCNC: 85 MG/DL (ref 74–99)
HCT VFR BLD AUTO: 39 % (ref 36–48)
HGB BLD-MCNC: 11.6 G/DL (ref 13–16)
IMM GRANULOCYTES # BLD AUTO: 0.01 K/UL (ref 0–0.04)
IMM GRANULOCYTES NFR BLD AUTO: 0.2 % (ref 0–0.5)
LYMPHOCYTES # BLD: 2.98 K/UL (ref 0.9–3.6)
LYMPHOCYTES NFR BLD: 51.9 % (ref 21–52)
MCH RBC QN AUTO: 25.1 PG (ref 24–34)
MCHC RBC AUTO-ENTMCNC: 29.7 G/DL (ref 31–37)
MCV RBC AUTO: 84.4 FL (ref 78–100)
MONOCYTES # BLD: 0.36 K/UL (ref 0.05–1.2)
MONOCYTES NFR BLD: 6.3 % (ref 3–10)
NEUTS SEG # BLD: 2.13 K/UL (ref 1.8–8)
NEUTS SEG NFR BLD: 37.1 % (ref 40–73)
NRBC # BLD: 0 K/UL (ref 0–0.01)
NRBC BLD-RTO: 0 PER 100 WBC
NUC STRESS EJECTION FRACTION: 46 %
PLATELET # BLD AUTO: 131 K/UL (ref 135–420)
PMV BLD AUTO: 13.9 FL (ref 9.2–11.8)
POTASSIUM SERPL-SCNC: 3.3 MMOL/L (ref 3.5–5.5)
RBC # BLD AUTO: 4.62 M/UL (ref 4.35–5.65)
SODIUM SERPL-SCNC: 144 MMOL/L (ref 136–145)
STRESS BASELINE DIAS BP: 82 MMHG
STRESS BASELINE HR: 39 BPM
STRESS BASELINE SYS BP: 142 MMHG
STRESS ESTIMATED WORKLOAD: 1 METS
STRESS PEAK DIAS BP: 66 MMHG
STRESS PEAK SYS BP: 143 MMHG
STRESS PERCENT HR ACHIEVED: 46 %
STRESS POST PEAK HR: 71 BPM
STRESS RATE PRESSURE PRODUCT: NORMAL BPM*MMHG
STRESS TARGET HR: 156 BPM
TID: 1.14
WBC # BLD AUTO: 5.7 K/UL (ref 4.6–13.2)

## 2025-02-10 PROCEDURE — 93017 CV STRESS TEST TRACING ONLY: CPT

## 2025-02-10 PROCEDURE — 80048 BASIC METABOLIC PNL TOTAL CA: CPT

## 2025-02-10 PROCEDURE — 93016 CV STRESS TEST SUPVJ ONLY: CPT | Performed by: INTERNAL MEDICINE

## 2025-02-10 PROCEDURE — 3430000000 HC RX DIAGNOSTIC RADIOPHARMACEUTICAL: Performed by: INTERNAL MEDICINE

## 2025-02-10 PROCEDURE — A9502 TC99M TETROFOSMIN: HCPCS | Performed by: INTERNAL MEDICINE

## 2025-02-10 PROCEDURE — 6360000002 HC RX W HCPCS

## 2025-02-10 PROCEDURE — 85025 COMPLETE CBC W/AUTO DIFF WBC: CPT

## 2025-02-10 PROCEDURE — 6360000002 HC RX W HCPCS: Performed by: STUDENT IN AN ORGANIZED HEALTH CARE EDUCATION/TRAINING PROGRAM

## 2025-02-10 PROCEDURE — 6370000000 HC RX 637 (ALT 250 FOR IP)

## 2025-02-10 PROCEDURE — 99232 SBSQ HOSP IP/OBS MODERATE 35: CPT | Performed by: INTERNAL MEDICINE

## 2025-02-10 PROCEDURE — 6360000002 HC RX W HCPCS: Performed by: INTERNAL MEDICINE

## 2025-02-10 PROCEDURE — 1100000000 HC RM PRIVATE

## 2025-02-10 PROCEDURE — 2580000003 HC RX 258

## 2025-02-10 PROCEDURE — 78452 HT MUSCLE IMAGE SPECT MULT: CPT

## 2025-02-10 PROCEDURE — 94640 AIRWAY INHALATION TREATMENT: CPT

## 2025-02-10 PROCEDURE — 78452 HT MUSCLE IMAGE SPECT MULT: CPT | Performed by: INTERNAL MEDICINE

## 2025-02-10 PROCEDURE — 94761 N-INVAS EAR/PLS OXIMETRY MLT: CPT

## 2025-02-10 PROCEDURE — 99232 SBSQ HOSP IP/OBS MODERATE 35: CPT | Performed by: STUDENT IN AN ORGANIZED HEALTH CARE EDUCATION/TRAINING PROGRAM

## 2025-02-10 PROCEDURE — 2500000003 HC RX 250 WO HCPCS

## 2025-02-10 PROCEDURE — 36415 COLL VENOUS BLD VENIPUNCTURE: CPT

## 2025-02-10 PROCEDURE — 93018 CV STRESS TEST I&R ONLY: CPT | Performed by: INTERNAL MEDICINE

## 2025-02-10 RX ORDER — REGADENOSON 0.08 MG/ML
0.4 INJECTION, SOLUTION INTRAVENOUS
Status: COMPLETED | OUTPATIENT
Start: 2025-02-10 | End: 2025-02-10

## 2025-02-10 RX ADMIN — AZITHROMYCIN MONOHYDRATE 250 MG: 500 INJECTION, POWDER, LYOPHILIZED, FOR SOLUTION INTRAVENOUS at 16:10

## 2025-02-10 RX ADMIN — ARFORMOTEROL TARTRATE 15 MCG: 15 SOLUTION RESPIRATORY (INHALATION) at 21:01

## 2025-02-10 RX ADMIN — TETROFOSMIN 30 MILLICURIE: 1.38 INJECTION, POWDER, LYOPHILIZED, FOR SOLUTION INTRAVENOUS at 12:34

## 2025-02-10 RX ADMIN — BUPRENORPHINE HYDROCHLORIDE AND NALOXONE HYDROCHLORIDE DIHYDRATE 1 TABLET: 8; 2 TABLET SUBLINGUAL at 21:49

## 2025-02-10 RX ADMIN — SODIUM CHLORIDE, PRESERVATIVE FREE 10 ML: 5 INJECTION INTRAVENOUS at 08:52

## 2025-02-10 RX ADMIN — BUDESONIDE 500 MCG: 0.5 INHALANT RESPIRATORY (INHALATION) at 21:01

## 2025-02-10 RX ADMIN — ASPIRIN 81 MG CHEWABLE TABLET 81 MG: 81 TABLET CHEWABLE at 08:52

## 2025-02-10 RX ADMIN — IPRATROPIUM BROMIDE 0.5 MG: 0.5 SOLUTION RESPIRATORY (INHALATION) at 21:01

## 2025-02-10 RX ADMIN — REGADENOSON 0.4 MG: 0.08 INJECTION, SOLUTION INTRAVENOUS at 12:34

## 2025-02-10 RX ADMIN — IPRATROPIUM BROMIDE 0.5 MG: 0.5 SOLUTION RESPIRATORY (INHALATION) at 08:43

## 2025-02-10 RX ADMIN — TETROFOSMIN 11 MILLICURIE: 1.38 INJECTION, POWDER, LYOPHILIZED, FOR SOLUTION INTRAVENOUS at 10:00

## 2025-02-10 RX ADMIN — BUDESONIDE 500 MCG: 0.5 INHALANT RESPIRATORY (INHALATION) at 08:43

## 2025-02-10 RX ADMIN — SODIUM CHLORIDE, PRESERVATIVE FREE 10 ML: 5 INJECTION INTRAVENOUS at 21:51

## 2025-02-10 RX ADMIN — ARFORMOTEROL TARTRATE 15 MCG: 15 SOLUTION RESPIRATORY (INHALATION) at 08:43

## 2025-02-10 RX ADMIN — BUPRENORPHINE HYDROCHLORIDE AND NALOXONE HYDROCHLORIDE DIHYDRATE 1 TABLET: 8; 2 TABLET SUBLINGUAL at 08:52

## 2025-02-10 ASSESSMENT — PAIN SCALES - WONG BAKER
WONGBAKER_NUMERICALRESPONSE: NO HURT
WONGBAKER_NUMERICALRESPONSE: NO HURT

## 2025-02-10 ASSESSMENT — PAIN SCALES - GENERAL
PAINLEVEL_OUTOF10: 0

## 2025-02-10 NOTE — PLAN OF CARE
Problem: Discharge Planning  Goal: Discharge to home or other facility with appropriate resources  2/10/2025 0118 by Najma Chua RN  Outcome: Progressing  2/9/2025 1408 by Emeli Rivera RN  Outcome: Progressing     Problem: Pain  Goal: Verbalizes/displays adequate comfort level or baseline comfort level  2/10/2025 0118 by Najma Chua RN  Outcome: Progressing  2/9/2025 1408 by Emeli Rivera RN  Outcome: Progressing     Problem: Safety - Adult  Goal: Free from fall injury  2/10/2025 0118 by Najma Chua RN  Outcome: Progressing  2/9/2025 1408 by Emeli Rivera RN  Outcome: Progressing     Problem: ABCDS Injury Assessment  Goal: Absence of physical injury  2/10/2025 0118 by Najma Chua RN  Outcome: Progressing  2/9/2025 1408 by Emeli Rivera RN  Outcome: Progressing     Problem: Cardiovascular - Adult  Goal: Maintains optimal cardiac output and hemodynamic stability  2/10/2025 0118 by Najma Chua RN  Outcome: Progressing  2/9/2025 1408 by Emeli Rivera RN  Outcome: Progressing  Goal: Absence of cardiac dysrhythmias or at baseline  2/10/2025 0118 by Najma Chua RN  Outcome: Progressing  2/9/2025 1408 by Emeli Rivera RN  Outcome: Progressing

## 2025-02-11 LAB
ANION GAP SERPL CALC-SCNC: 4 MMOL/L (ref 3–18)
BASOPHILS # BLD: 0.07 K/UL (ref 0–0.1)
BASOPHILS NFR BLD: 1.1 % (ref 0–2)
BUN SERPL-MCNC: 14 MG/DL (ref 7–18)
BUN/CREAT SERPL: 13 (ref 12–20)
CALCIUM SERPL-MCNC: 8.2 MG/DL (ref 8.5–10.1)
CHLORIDE SERPL-SCNC: 110 MMOL/L (ref 100–111)
CO2 SERPL-SCNC: 30 MMOL/L (ref 21–32)
CREAT SERPL-MCNC: 1.1 MG/DL (ref 0.6–1.3)
DIFFERENTIAL METHOD BLD: ABNORMAL
ECHO BSA: 1.9 M2
EOSINOPHIL # BLD: 0.26 K/UL (ref 0–0.4)
EOSINOPHIL NFR BLD: 4 % (ref 0–5)
ERYTHROCYTE [DISTWIDTH] IN BLOOD BY AUTOMATED COUNT: 14.1 % (ref 11.6–14.5)
GLUCOSE SERPL-MCNC: 89 MG/DL (ref 74–99)
HCT VFR BLD AUTO: 38.5 % (ref 36–48)
HGB BLD-MCNC: 11.5 G/DL (ref 13–16)
IMM GRANULOCYTES # BLD AUTO: 0.02 K/UL (ref 0–0.04)
IMM GRANULOCYTES NFR BLD AUTO: 0.3 % (ref 0–0.5)
LYMPHOCYTES # BLD: 3.22 K/UL (ref 0.9–3.6)
LYMPHOCYTES NFR BLD: 49.8 % (ref 21–52)
MCH RBC QN AUTO: 25.5 PG (ref 24–34)
MCHC RBC AUTO-ENTMCNC: 29.9 G/DL (ref 31–37)
MCV RBC AUTO: 85.4 FL (ref 78–100)
MONOCYTES # BLD: 0.42 K/UL (ref 0.05–1.2)
MONOCYTES NFR BLD: 6.5 % (ref 3–10)
NEUTS SEG # BLD: 2.48 K/UL (ref 1.8–8)
NEUTS SEG NFR BLD: 38.3 % (ref 40–73)
NRBC # BLD: 0 K/UL (ref 0–0.01)
NRBC BLD-RTO: 0 PER 100 WBC
PLATELET # BLD AUTO: 136 K/UL (ref 135–420)
PMV BLD AUTO: 13.6 FL (ref 9.2–11.8)
POTASSIUM SERPL-SCNC: 3.5 MMOL/L (ref 3.5–5.5)
RBC # BLD AUTO: 4.51 M/UL (ref 4.35–5.65)
SODIUM SERPL-SCNC: 144 MMOL/L (ref 136–145)
WBC # BLD AUTO: 6.5 K/UL (ref 4.6–13.2)

## 2025-02-11 PROCEDURE — 80048 BASIC METABOLIC PNL TOTAL CA: CPT

## 2025-02-11 PROCEDURE — 93458 L HRT ARTERY/VENTRICLE ANGIO: CPT | Performed by: INTERNAL MEDICINE

## 2025-02-11 PROCEDURE — 6360000002 HC RX W HCPCS: Performed by: INTERNAL MEDICINE

## 2025-02-11 PROCEDURE — 2709999900 HC NON-CHARGEABLE SUPPLY: Performed by: INTERNAL MEDICINE

## 2025-02-11 PROCEDURE — 76000 FLUOROSCOPY <1 HR PHYS/QHP: CPT | Performed by: INTERNAL MEDICINE

## 2025-02-11 PROCEDURE — 2580000003 HC RX 258: Performed by: INTERNAL MEDICINE

## 2025-02-11 PROCEDURE — 6360000004 HC RX CONTRAST MEDICATION: Performed by: INTERNAL MEDICINE

## 2025-02-11 PROCEDURE — 1100000003 HC PRIVATE W/ TELEMETRY

## 2025-02-11 PROCEDURE — C1769 GUIDE WIRE: HCPCS | Performed by: INTERNAL MEDICINE

## 2025-02-11 PROCEDURE — 99232 SBSQ HOSP IP/OBS MODERATE 35: CPT | Performed by: STUDENT IN AN ORGANIZED HEALTH CARE EDUCATION/TRAINING PROGRAM

## 2025-02-11 PROCEDURE — 94761 N-INVAS EAR/PLS OXIMETRY MLT: CPT

## 2025-02-11 PROCEDURE — 36415 COLL VENOUS BLD VENIPUNCTURE: CPT

## 2025-02-11 PROCEDURE — 94640 AIRWAY INHALATION TREATMENT: CPT

## 2025-02-11 PROCEDURE — 6360000002 HC RX W HCPCS

## 2025-02-11 PROCEDURE — 4A023N7 MEASUREMENT OF CARDIAC SAMPLING AND PRESSURE, LEFT HEART, PERCUTANEOUS APPROACH: ICD-10-PCS | Performed by: INTERNAL MEDICINE

## 2025-02-11 PROCEDURE — 85025 COMPLETE CBC W/AUTO DIFF WBC: CPT

## 2025-02-11 PROCEDURE — 99232 SBSQ HOSP IP/OBS MODERATE 35: CPT | Performed by: INTERNAL MEDICINE

## 2025-02-11 PROCEDURE — 6360000002 HC RX W HCPCS: Performed by: STUDENT IN AN ORGANIZED HEALTH CARE EDUCATION/TRAINING PROGRAM

## 2025-02-11 PROCEDURE — 2500000003 HC RX 250 WO HCPCS: Performed by: INTERNAL MEDICINE

## 2025-02-11 PROCEDURE — C1713 ANCHOR/SCREW BN/BN,TIS/BN: HCPCS | Performed by: INTERNAL MEDICINE

## 2025-02-11 PROCEDURE — 6370000000 HC RX 637 (ALT 250 FOR IP)

## 2025-02-11 PROCEDURE — 2500000003 HC RX 250 WO HCPCS

## 2025-02-11 PROCEDURE — 7100000010 HC PHASE II RECOVERY - FIRST 15 MIN: Performed by: INTERNAL MEDICINE

## 2025-02-11 PROCEDURE — 7100000011 HC PHASE II RECOVERY - ADDTL 15 MIN: Performed by: INTERNAL MEDICINE

## 2025-02-11 PROCEDURE — B2111ZZ FLUOROSCOPY OF MULTIPLE CORONARY ARTERIES USING LOW OSMOLAR CONTRAST: ICD-10-PCS | Performed by: INTERNAL MEDICINE

## 2025-02-11 RX ORDER — SODIUM CHLORIDE 0.9 % (FLUSH) 0.9 %
5-40 SYRINGE (ML) INJECTION PRN
Status: DISCONTINUED | OUTPATIENT
Start: 2025-02-11 | End: 2025-02-12 | Stop reason: HOSPADM

## 2025-02-11 RX ORDER — HEPARIN SODIUM 1000 [USP'U]/ML
INJECTION, SOLUTION INTRAVENOUS; SUBCUTANEOUS PRN
Status: DISCONTINUED | OUTPATIENT
Start: 2025-02-11 | End: 2025-02-11 | Stop reason: HOSPADM

## 2025-02-11 RX ORDER — SODIUM CHLORIDE 0.9 % (FLUSH) 0.9 %
5-40 SYRINGE (ML) INJECTION EVERY 12 HOURS SCHEDULED
Status: DISCONTINUED | OUTPATIENT
Start: 2025-02-11 | End: 2025-02-12 | Stop reason: HOSPADM

## 2025-02-11 RX ORDER — MIDAZOLAM HYDROCHLORIDE 1 MG/ML
INJECTION, SOLUTION INTRAMUSCULAR; INTRAVENOUS PRN
Status: DISCONTINUED | OUTPATIENT
Start: 2025-02-11 | End: 2025-02-11 | Stop reason: HOSPADM

## 2025-02-11 RX ORDER — SODIUM CHLORIDE 9 MG/ML
INJECTION, SOLUTION INTRAVENOUS CONTINUOUS
Status: DISCONTINUED | OUTPATIENT
Start: 2025-02-11 | End: 2025-02-11 | Stop reason: HOSPADM

## 2025-02-11 RX ORDER — SODIUM CHLORIDE 9 MG/ML
INJECTION, SOLUTION INTRAVENOUS PRN
Status: DISCONTINUED | OUTPATIENT
Start: 2025-02-11 | End: 2025-02-12 | Stop reason: HOSPADM

## 2025-02-11 RX ORDER — IODIXANOL 270 MG/ML
INJECTION, SOLUTION INTRAVASCULAR PRN
Status: DISCONTINUED | OUTPATIENT
Start: 2025-02-11 | End: 2025-02-11 | Stop reason: HOSPADM

## 2025-02-11 RX ORDER — ACETAMINOPHEN 325 MG/1
650 TABLET ORAL EVERY 4 HOURS PRN
Status: DISCONTINUED | OUTPATIENT
Start: 2025-02-11 | End: 2025-02-12 | Stop reason: HOSPADM

## 2025-02-11 RX ORDER — IOPAMIDOL 612 MG/ML
INJECTION, SOLUTION INTRAVASCULAR PRN
Status: DISCONTINUED | OUTPATIENT
Start: 2025-02-11 | End: 2025-02-11 | Stop reason: HOSPADM

## 2025-02-11 RX ADMIN — BUDESONIDE 500 MCG: 0.5 INHALANT RESPIRATORY (INHALATION) at 08:36

## 2025-02-11 RX ADMIN — SODIUM CHLORIDE, PRESERVATIVE FREE 10 ML: 5 INJECTION INTRAVENOUS at 21:47

## 2025-02-11 RX ADMIN — IPRATROPIUM BROMIDE 0.5 MG: 0.5 SOLUTION RESPIRATORY (INHALATION) at 08:37

## 2025-02-11 RX ADMIN — SODIUM CHLORIDE: 9 INJECTION, SOLUTION INTRAVENOUS at 17:46

## 2025-02-11 RX ADMIN — ARFORMOTEROL TARTRATE 15 MCG: 15 SOLUTION RESPIRATORY (INHALATION) at 08:36

## 2025-02-11 RX ADMIN — SODIUM CHLORIDE, PRESERVATIVE FREE 10 ML: 5 INJECTION INTRAVENOUS at 21:46

## 2025-02-11 RX ADMIN — ASPIRIN 81 MG CHEWABLE TABLET 81 MG: 81 TABLET CHEWABLE at 08:14

## 2025-02-11 RX ADMIN — BUPRENORPHINE HYDROCHLORIDE AND NALOXONE HYDROCHLORIDE DIHYDRATE 1 TABLET: 8; 2 TABLET SUBLINGUAL at 21:47

## 2025-02-11 RX ADMIN — BUPRENORPHINE HYDROCHLORIDE AND NALOXONE HYDROCHLORIDE DIHYDRATE 1 TABLET: 8; 2 TABLET SUBLINGUAL at 08:14

## 2025-02-11 ASSESSMENT — PAIN SCALES - GENERAL
PAINLEVEL_OUTOF10: 0

## 2025-02-11 NOTE — PLAN OF CARE
Problem: Discharge Planning  Goal: Discharge to home or other facility with appropriate resources  Outcome: Progressing  Flowsheets (Taken 2/10/2025 1940)  Discharge to home or other facility with appropriate resources: Identify barriers to discharge with patient and caregiver     Problem: Pain  Goal: Verbalizes/displays adequate comfort level or baseline comfort level  Outcome: Progressing     Problem: Safety - Adult  Goal: Free from fall injury  Outcome: Progressing     Problem: Cardiovascular - Adult  Goal: Maintains optimal cardiac output and hemodynamic stability  Outcome: Progressing  Flowsheets (Taken 2/10/2025 1940)  Maintains optimal cardiac output and hemodynamic stability: Monitor blood pressure and heart rate  Goal: Absence of cardiac dysrhythmias or at baseline  Outcome: Progressing

## 2025-02-11 NOTE — PLAN OF CARE
Problem: Discharge Planning  Goal: Discharge to home or other facility with appropriate resources  Outcome: Progressing  Flowsheets (Taken 2/11/2025 1442)  Discharge to home or other facility with appropriate resources:   Arrange for needed discharge resources and transportation as appropriate   Identify barriers to discharge with patient and caregiver   Identify discharge learning needs (meds, wound care, etc)     Problem: Pain  Goal: Verbalizes/displays adequate comfort level or baseline comfort level  Outcome: Progressing  Flowsheets (Taken 2/11/2025 1442)  Verbalizes/displays adequate comfort level or baseline comfort level:   Assess pain using appropriate pain scale   Administer analgesics based on type and severity of pain and evaluate response     Problem: Safety - Adult  Goal: Free from fall injury  Outcome: Progressing  Flowsheets (Taken 2/11/2025 1442)  Free From Fall Injury: Instruct family/caregiver on patient safety     Problem: ABCDS Injury Assessment  Goal: Absence of physical injury  Outcome: Progressing  Flowsheets (Taken 2/11/2025 1442)  Absence of Physical Injury: Implement safety measures based on patient assessment     Problem: Cardiovascular - Adult  Goal: Maintains optimal cardiac output and hemodynamic stability  Outcome: Progressing  Flowsheets (Taken 2/11/2025 1442)  Maintains optimal cardiac output and hemodynamic stability:   Monitor blood pressure and heart rate   Assess for signs of decreased cardiac output  Goal: Absence of cardiac dysrhythmias or at baseline  Outcome: Progressing  Flowsheets (Taken 2/11/2025 1442)  Absence of cardiac dysrhythmias or at baseline: Monitor cardiac rate and rhythm     Problem: Infection - Adult  Goal: Absence of infection at discharge  Outcome: Progressing  Flowsheets (Taken 2/11/2025 1442)  Absence of infection at discharge:   Assess and monitor for signs and symptoms of infection   Monitor lab/diagnostic results   Monitor all insertion sites i.e.,

## 2025-02-12 VITALS
TEMPERATURE: 98.1 F | RESPIRATION RATE: 17 BRPM | WEIGHT: 163 LBS | OXYGEN SATURATION: 97 % | BODY MASS INDEX: 24.14 KG/M2 | DIASTOLIC BLOOD PRESSURE: 83 MMHG | HEIGHT: 69 IN | SYSTOLIC BLOOD PRESSURE: 127 MMHG | HEART RATE: 48 BPM

## 2025-02-12 LAB
ANION GAP SERPL CALC-SCNC: 1 MMOL/L (ref 3–18)
BACTERIA SPEC CULT: NORMAL
BACTERIA SPEC CULT: NORMAL
BASOPHILS # BLD: 0.05 K/UL (ref 0–0.1)
BASOPHILS NFR BLD: 1 % (ref 0–2)
BUN SERPL-MCNC: 16 MG/DL (ref 7–18)
BUN/CREAT SERPL: 15 (ref 12–20)
CALCIUM SERPL-MCNC: 8.7 MG/DL (ref 8.5–10.1)
CHLORIDE SERPL-SCNC: 108 MMOL/L (ref 100–111)
CO2 SERPL-SCNC: 30 MMOL/L (ref 21–32)
CREAT SERPL-MCNC: 1.04 MG/DL (ref 0.6–1.3)
DIFFERENTIAL METHOD BLD: ABNORMAL
EOSINOPHIL # BLD: 0.16 K/UL (ref 0–0.4)
EOSINOPHIL NFR BLD: 3 % (ref 0–5)
ERYTHROCYTE [DISTWIDTH] IN BLOOD BY AUTOMATED COUNT: 14 % (ref 11.6–14.5)
GLUCOSE SERPL-MCNC: 86 MG/DL (ref 74–99)
HCT VFR BLD AUTO: 37.5 % (ref 36–48)
HGB BLD-MCNC: 11.4 G/DL (ref 13–16)
IMM GRANULOCYTES # BLD AUTO: 0 K/UL (ref 0–0.04)
IMM GRANULOCYTES NFR BLD AUTO: 0 % (ref 0–0.5)
LYMPHOCYTES # BLD: 2.86 K/UL (ref 0.9–3.6)
LYMPHOCYTES NFR BLD: 55 % (ref 21–52)
MCH RBC QN AUTO: 25.5 PG (ref 24–34)
MCHC RBC AUTO-ENTMCNC: 30.4 G/DL (ref 31–37)
MCV RBC AUTO: 83.9 FL (ref 78–100)
MONOCYTES # BLD: 0.21 K/UL (ref 0.05–1.2)
MONOCYTES NFR BLD: 4 % (ref 3–10)
NEUTS SEG # BLD: 1.92 K/UL (ref 1.8–8)
NEUTS SEG NFR BLD: 37 % (ref 40–73)
NRBC # BLD: 0 K/UL (ref 0–0.01)
NRBC BLD-RTO: 0 PER 100 WBC
PLATELET # BLD AUTO: 134 K/UL (ref 135–420)
PLATELET COMMENT: ABNORMAL
PMV BLD AUTO: 14.1 FL (ref 9.2–11.8)
POTASSIUM SERPL-SCNC: 3.8 MMOL/L (ref 3.5–5.5)
RBC # BLD AUTO: 4.47 M/UL (ref 4.35–5.65)
RBC MORPH BLD: ABNORMAL
SERVICE CMNT-IMP: NORMAL
SERVICE CMNT-IMP: NORMAL
SODIUM SERPL-SCNC: 139 MMOL/L (ref 136–145)
WBC # BLD AUTO: 5.2 K/UL (ref 4.6–13.2)

## 2025-02-12 PROCEDURE — 85025 COMPLETE CBC W/AUTO DIFF WBC: CPT

## 2025-02-12 PROCEDURE — 94640 AIRWAY INHALATION TREATMENT: CPT

## 2025-02-12 PROCEDURE — 99239 HOSP IP/OBS DSCHRG MGMT >30: CPT | Performed by: STUDENT IN AN ORGANIZED HEALTH CARE EDUCATION/TRAINING PROGRAM

## 2025-02-12 PROCEDURE — 6360000002 HC RX W HCPCS: Performed by: STUDENT IN AN ORGANIZED HEALTH CARE EDUCATION/TRAINING PROGRAM

## 2025-02-12 PROCEDURE — 2500000003 HC RX 250 WO HCPCS

## 2025-02-12 PROCEDURE — 2500000003 HC RX 250 WO HCPCS: Performed by: INTERNAL MEDICINE

## 2025-02-12 PROCEDURE — 94761 N-INVAS EAR/PLS OXIMETRY MLT: CPT

## 2025-02-12 PROCEDURE — 6360000002 HC RX W HCPCS

## 2025-02-12 PROCEDURE — 6370000000 HC RX 637 (ALT 250 FOR IP)

## 2025-02-12 PROCEDURE — 99232 SBSQ HOSP IP/OBS MODERATE 35: CPT | Performed by: INTERNAL MEDICINE

## 2025-02-12 PROCEDURE — 80048 BASIC METABOLIC PNL TOTAL CA: CPT

## 2025-02-12 PROCEDURE — 36415 COLL VENOUS BLD VENIPUNCTURE: CPT

## 2025-02-12 RX ADMIN — ASPIRIN 81 MG CHEWABLE TABLET 81 MG: 81 TABLET CHEWABLE at 09:48

## 2025-02-12 RX ADMIN — IPRATROPIUM BROMIDE 0.5 MG: 0.5 SOLUTION RESPIRATORY (INHALATION) at 09:14

## 2025-02-12 RX ADMIN — BUPRENORPHINE HYDROCHLORIDE AND NALOXONE HYDROCHLORIDE DIHYDRATE 1 TABLET: 8; 2 TABLET SUBLINGUAL at 09:48

## 2025-02-12 RX ADMIN — ARFORMOTEROL TARTRATE 15 MCG: 15 SOLUTION RESPIRATORY (INHALATION) at 09:14

## 2025-02-12 RX ADMIN — SODIUM CHLORIDE, PRESERVATIVE FREE 10 ML: 5 INJECTION INTRAVENOUS at 09:49

## 2025-02-12 RX ADMIN — SODIUM CHLORIDE, PRESERVATIVE FREE 10 ML: 5 INJECTION INTRAVENOUS at 09:48

## 2025-02-12 RX ADMIN — BUDESONIDE 500 MCG: 0.5 INHALANT RESPIRATORY (INHALATION) at 09:14

## 2025-02-12 ASSESSMENT — PAIN SCALES - GENERAL
PAINLEVEL_OUTOF10: 0

## 2025-02-12 NOTE — PLAN OF CARE
Problem: Discharge Planning  Goal: Discharge to home or other facility with appropriate resources  Recent Flowsheet Documentation  Taken 2/11/2025 1930 by Joselin Irwin RN  Discharge to home or other facility with appropriate resources:   Identify barriers to discharge with patient and caregiver   Arrange for needed discharge resources and transportation as appropriate  2/11/2025 1442 by Carissa Samuel RN  Outcome: Progressing  Flowsheets (Taken 2/11/2025 1442)  Discharge to home or other facility with appropriate resources:   Arrange for needed discharge resources and transportation as appropriate   Identify barriers to discharge with patient and caregiver   Identify discharge learning needs (meds, wound care, etc)     Problem: Cardiovascular - Adult  Goal: Maintains optimal cardiac output and hemodynamic stability  2/11/2025 2236 by Joselin Irwin RN  Outcome: Progressing  Flowsheets (Taken 2/11/2025 1930)  Maintains optimal cardiac output and hemodynamic stability:   Monitor blood pressure and heart rate   Assess for signs of decreased cardiac output  2/11/2025 1442 by Carissa Samuel RN  Outcome: Progressing  Flowsheets (Taken 2/11/2025 1442)  Maintains optimal cardiac output and hemodynamic stability:   Monitor blood pressure and heart rate   Assess for signs of decreased cardiac output  Goal: Absence of cardiac dysrhythmias or at baseline  2/11/2025 2236 by Joselin Irwin RN  Outcome: Progressing  Flowsheets (Taken 2/11/2025 1930)  Absence of cardiac dysrhythmias or at baseline: Monitor cardiac rate and rhythm  2/11/2025 1442 by Carissa Samuel RN  Outcome: Progressing  Flowsheets (Taken 2/11/2025 1442)  Absence of cardiac dysrhythmias or at baseline: Monitor cardiac rate and rhythm     Problem: Cardiovascular - Adult  Goal: Absence of cardiac dysrhythmias or at baseline  2/11/2025 2236 by Joselin Irwin RN  Outcome: Progressing  Flowsheets (Taken 2/11/2025 1930)  Absence of cardiac dysrhythmias or

## 2025-02-12 NOTE — PROGRESS NOTES
4 Eyes Skin Assessment     NAME:  Adin Shen  YOB: 1960  MEDICAL RECORD NUMBER:  [id number]    The patient is being assessed for  Shift Handoff    I agree that at least one RN has performed a thorough Head to Toe Skin Assessment on the patient. ALL assessment sites listed below have been assessed.      Areas assessed by both nurses:    Sacrum. Buttock, Coccyx, Ischium        Does the Patient have a Wound? No noted wound(s)       Nhan Prevention initiated by RN: No  Wound Care Orders initiated by RN: No    Pressure Injury (Stage 3,4, Unstageable, DTI, NWPT, and Complex wounds) if present, place Wound referral order by RN under : No    New Ostomies, if present place, Ostomy referral order under : No     Nurse 1 eSignature: Electronically signed by Joselin Irwin RN on 2/11/25 at 8:22 AM EST    **SHARE this note so that the co-signing nurse can place an eSignature**    Nurse 2 eSignature: {Esignature:[id number]}    
4 Eyes Skin Assessment     NAME:  Adin Shen  YOB: 1960  MEDICAL RECORD NUMBER:  [id number]    The patient is being assessed for  Shift Handoff    I agree that at least one RN has performed a thorough Head to Toe Skin Assessment on the patient. ALL assessment sites listed below have been assessed.      Areas assessed by both nurses:    Head, Face, Ears, Shoulders, Back, Chest, Arms, Elbows, Hands, Sacrum. Buttock, Coccyx, Ischium, Legs. Feet and Heels, and Under Medical Devices         Does the Patient have a Wound? No noted wound(s)       Nhan Prevention initiated by RN: No  Wound Care Orders initiated by RN: No    Pressure Injury (Stage 3,4, Unstageable, DTI, NWPT, and Complex wounds) if present, place Wound referral order by RN under : No    New Ostomies, if present place, Ostomy referral order under : No     Nurse 1 eSignature: Electronically signed by Carissa Samuel RN on 2/11/25 at 7:02 PM EST    **SHARE this note so that the co-signing nurse can place an eSignature**    Nurse 2 eSignature: {Esignature:[id number]}    
4 Eyes Skin Assessment     NAME:  Adin Shen  YOB: 1960  MEDICAL RECORD NUMBER:  [id number]    The patient is being assessed for  Shift Handoff    I agree that at least one RN has performed a thorough Head to Toe Skin Assessment on the patient. ALL assessment sites listed below have been assessed.      Areas assessed by both nurses:    Head, Face, Ears, Shoulders, Back, Chest, Arms, Elbows, Hands, Sacrum. Buttock, Coccyx, Ischium, and Legs. Feet and Heels        Does the Patient have a Wound? No noted wound(s)       Nhan Prevention initiated by RN: No  Wound Care Orders initiated by RN: No    Pressure Injury (Stage 3,4, Unstageable, DTI, NWPT, and Complex wounds) if present, place Wound referral order by RN under : No    New Ostomies, if present place, Ostomy referral order under : No     Nurse 1 eSignature: Electronically signed by Emeli Rivera RN on 2/9/25 at 7:19 PM EST    **SHARE this note so that the co-signing nurse can place an eSignature**    Nurse 2 eSignature: {Esignature:[id number]}   
4 Eyes Skin Assessment     NAME:  Adin Shen  YOB: 1960  MEDICAL RECORD NUMBER:  [id number]    The patient is being assessed for  Shift Handoff    I agree that at least one RN has performed a thorough Head to Toe Skin Assessment on the patient. ALL assessment sites listed below have been assessed.      Areas assessed by both nurses:    Head, Face, Ears, Shoulders, Back, Chest, Arms, Elbows, Hands, Sacrum. Buttock, Coccyx, Ischium, Legs. Feet and Heels, and Under Medical Devices         Does the Patient have a Wound? No noted wound(s)       Nhan Prevention initiated by RN: Yes  Wound Care Orders initiated by RN: No    Pressure Injury (Stage 3,4, Unstageable, DTI, NWPT, and Complex wounds) if present, place Wound referral order by RN under : No    New Ostomies, if present place, Ostomy referral order under : No     Nurse 1 eSignature: Electronically signed by Joanne Coronado RN on 2/7/25 at 8:46 AM EST    **SHARE this note so that the co-signing nurse can place an eSignature**    Nurse 2 eSignature: Electronically signed by Shantal White RN on 2/7/25 at 9:15 AM EST   
4 Eyes Skin Assessment     NAME:  Adin Shen  YOB: 1960  MEDICAL RECORD NUMBER:  [id number]    The patient is being assessed for  Shift Handoff    I agree that at least one RN has performed a thorough Head to Toe Skin Assessment on the patient. ALL assessment sites listed below have been assessed.      Areas assessed by both nurses:    Head, Face, Ears, Shoulders, Back, Chest, Arms, Elbows, Hands, Sacrum. Buttock, Coccyx, Ischium, and Legs. Feet and Heels        Does the Patient have a Wound? No noted wound(s)       Nhan Prevention initiated by RN: No  Wound Care Orders initiated by RN: No    Pressure Injury (Stage 3,4, Unstageable, DTI, NWPT, and Complex wounds) if present, place Wound referral order by RN under : No    New Ostomies, if present place, Ostomy referral order under : No     Nurse 1 eSignature: Electronically signed by Shantal White RN on 2/6/25 at 7:00 PM EST    **SHARE this note so that the co-signing nurse can place an eSignature**    Nurse 2 eSignature: {Esignature:[id number]}    
4 Eyes Skin Assessment     NAME:  Adin Shen  YOB: 1960  MEDICAL RECORD NUMBER:  [id number]    The patient is being assessed for  Shift Handoff    I agree that at least one RN has performed a thorough Head to Toe Skin Assessment on the patient. ALL assessment sites listed below have been assessed.      Areas assessed by both nurses:    Head, Face, Ears, Shoulders, Back, Chest, Arms, Elbows, Hands, Sacrum. Buttock, Coccyx, Ischium, Legs. Feet and Heels, and Under Medical Devices         Does the Patient have a Wound? No noted wound(s)       Nhan Prevention initiated by RN: No  Wound Care Orders initiated by RN: No    Pressure Injury (Stage 3,4, Unstageable, DTI, NWPT, and Complex wounds) if present, place Wound referral order by RN under : No    New Ostomies, if present place, Ostomy referral order under : No     Nurse 1 eSignature: Electronically signed by Carissa Samuel RN on 2/8/25 at 7:32 PM EST    **SHARE this note so that the co-signing nurse can place an eSignature**    Nurse 2 eSignature: Electronically signed by Emeli Rivera RN on 2/9/25 at 7:17 PM EST    
4 Eyes Skin Assessment     NAME:  Adin Shen  YOB: 1960  MEDICAL RECORD NUMBER:  [id number]    The patient is being assessed for  Post-Op Surgical    I agree that at least one RN has performed a thorough Head to Toe Skin Assessment on the patient. ALL assessment sites listed below have been assessed.      Areas assessed by both nurses:    Head, Face, Ears, Shoulders, Back, Chest, Arms, Elbows, Hands, Sacrum. Buttock, Coccyx, Ischium, and Legs. Feet and Heels        Does the Patient have a Wound? No noted wound(s)       Nhan Prevention initiated by RN: No  Wound Care Orders initiated by RN: No    Pressure Injury (Stage 3,4, Unstageable, DTI, NWPT, and Complex wounds) if present, place Wound referral order by RN under : No    New Ostomies, if present place, Ostomy referral order under : No     Nurse 1 eSignature: Electronically signed by Emeli Rivera RN on 2/9/25 at 7:17 PM EST    **SHARE this note so that the co-signing nurse can place an eSignature**    Nurse 2 eSignature: {Esignature:[id number]}   
4 Eyes Skin Assessment     NAME:  Adin Shen  YOB: 1960  MEDICAL RECORD NUMBER:  [id number]    The patient is being assessed for  Shift Handoff    I agree that at least one RN has performed a thorough Head to Toe Skin Assessment on the patient. ALL assessment sites listed below have been assessed.      Areas assessed by both nurses:    Head, Face, Ears, Shoulders, Back, Chest, Arms, Elbows, Hands, Sacrum. Buttock, Coccyx, Ischium, and Legs. Feet and Heels        Does the Patient have a Wound? No noted wound(s)       Nhan Prevention initiated by RN: No  Wound Care Orders initiated by RN: No    Pressure Injury (Stage 3,4, Unstageable, DTI, NWPT, and Complex wounds) if present, place Wound referral order by RN under : No    New Ostomies, if present place, Ostomy referral order under : No     Nurse 1 eSignature: Electronically signed by Shantal White RN on 2/7/25 at 6:17 PM EST    **SHARE this note so that the co-signing nurse can place an eSignature**    Nurse 2 eSignature: {Esignature:[id number]}    
4 Eyes Skin Assessment     NAME:  Adin Shen  YOB: 1960  MEDICAL RECORD NUMBER:  [id number]    The patient is being assessed for  Shift Handoff    I agree that at least one RN has performed a thorough Head to Toe Skin Assessment on the patient. ALL assessment sites listed below have been assessed.      Areas assessed by both nurses:    Head, Face, Ears, Shoulders, Back, Chest, Arms, Elbows, Hands, Sacrum. Buttock, Coccyx, Ischium, Legs. Feet and Heels, and Under Medical Devices         Does the Patient have a Wound? No noted wound(s)       Nhan Prevention initiated by RN: No  Wound Care Orders initiated by RN: No    Pressure Injury (Stage 3,4, Unstageable, DTI, NWPT, and Complex wounds) if present, place Wound referral order by RN under : No    New Ostomies, if present place, Ostomy referral order under : No     Nurse 1 eSignature: Electronically signed by Carissa Samuel RN on 2/10/25 at 9:15 PM EST    **SHARE this note so that the co-signing nurse can place an eSignature**    Nurse 2 eSignature: Electronically signed by Joselin Irwin RN on 2/10/25 at 1935 PM EST    
About 1300 Dave from central monitoring came to unit to show monitor strip showing idioventricular rhythm, HR 29, episode sustaining for 6 seconds    Pt is awake in bed, talking on mu.    Paged Dr. Banda. MD called back and informed above. MD will reach out to cardiologist  
Cardiology Progress Note    Admit Date: 2/6/2025  Attending Cardiologist: Dr. Griffith    IMPRESSION  Bradycardia, EKG 2/6/2025 marked sinus bradycardia, right bundle branch block, minimal voltage criteria for LVH, heart monitor October 2023 lowest heart rate noted at 28 bpm with maximum pause of up to 2.5 seconds usually during early morning hours, occasional ventricular escape beats during early morning hours, TSH within normal limits March 2024  Possibly cardiogenic chest discomfort  Elevated troponin 26ng/L, 29 ng/L within intermediate range, 2D echo December 2023 ejection fraction 50 to 55% without significant valvular abnormalities, EKG treadmill stress test November 2023 negative for ischemia  Tobacco Abuse  History of cocaine abuse     PLAN  Recommend avoid tasah blockers, telemetry monitoring  Monitor blood pressure, adjust antihypertensive medications as necessary.  Patient currently on Norvasc 5 mg p.o. daily  Check 2D transthoracic echocardiogram evaluate for structural heart disease currently pending  Continue with aspirin 81 mg p.o. daily  Nuclear chemical stress test Monday with NPO MN Sunday.    Discussed with patient regarding pacemaker states that he is amenable to getting a pacemaker at this time for indication symptomatic bradycardia lightheadedness.  However, patient is currently being treated with antibiotics for pneumonia.  Would recommend being treated for pneumonia first and consider for stress testing contingent on troponin results and echocardiogram results given chief complaint to come to the hospital with chest discomfort.    Recommend correct electrolytes, Potassium >4, Magnesium >2     Thank you for this consultation and for allowing us to participate in this patient's care.     Primary cardiologist Dr. JOSEPHINE Velasquez    Subjective:     Denies chest pain  Denies shortness of breath  Denies abdominal pain      Objective:      Patient Vitals for the past 8 hrs:   Temp Pulse Resp BP SpO2 
Cardiology Progress Note    Admit Date: 2/6/2025  Attending Cardiologist: Dr. Griffith    IMPRESSION  Bradycardia, EKG 2/6/2025 marked sinus bradycardia, right bundle branch block, minimal voltage criteria for LVH, heart monitor October 2023 lowest heart rate noted at 28 bpm with maximum pause of up to 2.5 seconds usually during early morning hours, occasional ventricular escape beats during early morning hours, TSH within normal limits March 2024  Possibly cardiogenic chest discomfort, 2d echo 2/7/2025 with ejection fraction 50 to 55 percent  Mild aortic valve regurgitation by 2d echo 2/7/2025  HTN - Stage II systolic with normotensive to hypotensive diastolic pressure, high pulse pressure  Elevated troponin 26ng/L, 29 ng/L within intermediate range, 2D echo December 2023 ejection fraction 50 to 55% without significant valvular abnormalities, EKG treadmill stress test November 2023 negative for ischemia  Tobacco Abuse  History of cocaine abuse     PLAN  Recommend avoid tasha blockers, telemetry monitoring  Monitor blood pressure, adjust antihypertensive medications as necessary.  Patient currently on Norvasc 5 mg p.o. daily  Continue with aspirin 81 mg p.o. daily  Stress MPI evaluate for obstructive coronary artery disease.    Recommend correct electrolytes, Potassium >4, Magnesium >2     Thank you for this consultation and for allowing us to participate in this patient's care.     Primary cardiologist Dr. JOSEPHINE Velasquez    Subjective:     Denies chest pain, denies shortness of breath, denies abdominal pain     Objective:      Patient Vitals for the past 8 hrs:   Temp Pulse Resp BP SpO2   02/10/25 0800 98.4 °F (36.9 °C) 87 16 115/81 97 %   02/10/25 0450 98.8 °F (37.1 °C) (!) 44 16 (!) 141/75 100 %   02/10/25 0027 98.2 °F (36.8 °C) 54 16 (!) 143/71 94 %         Patient Vitals for the past 96 hrs:   Weight   02/07/25 0924 73.9 kg (163 lb)   02/06/25 1523 73.9 kg (163 lb)       EXAMINATION:  General:         Alert, 
Cardiology Progress Note    Admit Date: 2/6/2025  Attending Cardiologist: Dr. Griffith    IMPRESSION  Bradycardia, EKG 2/6/2025 marked sinus bradycardia, right bundle branch block, minimal voltage criteria for LVH, heart monitor October 2023 lowest heart rate noted at 28 bpm with maximum pause of up to 2.5 seconds usually during early morning hours, occasional ventricular escape beats during early morning hours, TSH within normal limits March 2024  Possibly cardiogenic chest discomfort, 2d echo 2/7/2025 with ejection fraction 50 to 55 percent, Nuclear chemical stress test 2/10/2025 moderate risk ejection fraction 46 percent.    Mild aortic valve regurgitation by 2d echo 2/7/2025  HTN - Stage II systolic with normotensive to hypotensive diastolic pressure, high pulse pressure  Elevated troponin 26ng/L, 29 ng/L within intermediate range, 2D echo December 2023 ejection fraction 50 to 55% without significant valvular abnormalities, EKG treadmill stress test November 2023 negative for ischemia  Tobacco Abuse  History of cocaine abuse     PLAN  Recommend avoid tasha blockers, telemetry monitoring  Monitor blood pressure, adjust antihypertensive medications as necessary.  Patient currently on Norvasc 5 mg p.o. daily  Continue with aspirin 81 mg p.o. daily  Recommend correct electrolytes, Potassium >4, Magnesium >2  The benefits and risks of cardiac catheterization have been discussed in detail with the patient or healthcare power of . Patient understands  risk of potential cath complications including but not limited to bleeding, infection, dialysis or renal function decline, difficulty healing the arteriotomy access site which may require surgical repair, potential thromboembolic complications which could result in stroke, myocardial infarction, vascular injury, loss of limb or organ function and/or death and potential allergic reaction to contrast dye or other medication used during the procedure. Patient is also 
Cardiology Progress Note    Admit Date: 2/6/2025  Attending Cardiologist: Dr. Mick Velasquez     Assessment:     -Chronic sinus bradycardia, asymptomatic.   Event monitor 2023 Lowest heart rate noted as low as 28 bpm with maximum pause up to 2.5 seconds usually during early morning hours.  -Chest pain likely 2/2 cocaine abuse, s/p normal [facility] 2/11/25  -HTN  -Polysubstance abuse, cocaine and tobacco    Primary Cardiologist, Dr. Mick Velasquez     Plan:       I saw, evaluated, interviewed and examined the patient personally.  Patient without any cardiac complaint  Denies any symptoms that is concerning for bradycardia related symptoms.  No presyncope or syncope.  No fatigue  Ongoing cocaine abuse.  Counseling provided  Discussed with the patient regarding signs and symptoms of bradycardia.  Heart rate appropriate when patient is walking around.  No coronary event.  Cardiac catheterization normal yesterday  Once discharged, follow-up with in the clinic.  No need for pacemaker at this time.  Long history of asymptomatic bradycardia.  Will follow closely as outpatient    Mick Velasquez MD       Tele reviewed, no significant pauses, HR stable in the 40s with stable BP.    Avoid AVN blocking agents.   No plans for PPM at this time. Will likely need a PPM in the future but given lack of symptoms and longstanding hx, will follow clinically as an outpatient. Patient agreeable to plan.   S/s of bradycardia discussed with patient at length.   S/p LHC yesterday with normal coronaries, right wrist stable without obvious hematoma   Encourage lifestyle modifications   No further recommendations from a cardiac standpoint. Patient to follow up with Dr. Velasquez post discharge.     Subjective:     Denies dizziness, fatigue, lightheadedness, near syncope or syncope.     Objective:      Patient Vitals for the past 8 hrs:   Temp Pulse Resp BP SpO2   02/12/25 0726 98.2 °F (36.8 °C) -- 18 (!) 126/93 99 %   02/12/25 0515 98.6 °F (37 °C) (!) 44 20 
Cath holding summary:  0700: Verbal report received from ISABELA Newton on Adin Shen being received from 213 for ordered procedure. Report consisted of patient's Situation, Background, Assessment and Recommendations (SBAR). Information from the following report(s) Nurse Handoff Report, Intake/Output, MAR, Recent Results, Med Rec Status, Cardiac Rhythm SB with a low of 37, Quality Measures, and Neuro Assessment was reviewed with the receiving nurse. Pt A&O x4, no c/o pain. Opportunity for questions and clarification provided.    1335: Patient transported on bed from inpatient room 213 without difficulty, placed on monitor SB. A&O x4, no c/o pain. NPO since midnight, ID and allergies verified. H&P reviewed, med rec completed. Groin prep completed, consent ready for signature.    1445: Verbal report given to Lisa on Adin Shen being transferred to cath lab for ordered procedure. Report consisted of patient's Situation, Background, Assessment and Recommendations (SBAR). Information from the following report(s) Nurse Handoff Report, Intake/Output, MAR, Recent Results, Med Rec Status, Cardiac Rhythm SB, Pre Procedure Checklist, and Procedure Verification was reviewed with the receiving nurse. Opportunity for questions and clarification was provided.    1540: Verbal report received from Concha on Adin Shen being received from cath lab for routine post-op. Report consisted of patient's Situation, Background, Assessment and Recommendations (SBAR). Information from the following report(s) Nurse Handoff Report, Surgery Report, Intake/Output, MAR, Recent Results, Med Rec Status, Cardiac Rhythm SB, and Event Log was reviewed with the receiving nurse. Pt A&O x4, no c/o pain. Opportunity for questions and clarification provided.  Procedure: Cardiac Cath  Intervention: No   If yes, antiplatelet administered:  Site: Right, Arm      1645: R band removed from right wrist per protocol. No bleeding or hematoma noted, 
[facility]  Progress Note  Date:2025       Room:[facility]  Patient Name:Adin Shen     YOB: 1960     Age:64 y.o.        Subjective    Subjective:  Symptoms:  Stable.    Diet:  Adequate intake.    Activity level: Normal.       Review of Systems    Patient denies lightheadedness, dizziness, chest pain, shortness of breath.  This afternoon patient's heart rate went down to the 20s while he was awake.  Patient was started on p.o. antibiotics and Wednesday prior to discharge for pneumonia.    Disposition: Stable for discharge home on 2/10 versus .  Pending stress test on 2/10    Objective         Vitals Last 24 Hours:  TEMPERATURE:  Temp  Av.3 °F (36.8 °C)  Min: 97.8 °F (36.6 °C)  Max: 98.6 °F (37 °C)  RESPIRATIONS RANGE: Resp  Av.3  Min: 9  Max: 16  PULSE OXIMETRY RANGE: SpO2  Av.5 %  Min: 96 %  Max: 100 %  PULSE RANGE: Pulse  Av  Min: 32  Max: 60  BLOOD PRESSURE RANGE: Systolic (24hrs), Av , Min:120 , Max:161     ; Diastolic (24hrs), Av, Min:66, Max:97      I/O (24Hr):    Intake/Output Summary (Last 24 hours) at 2025 0914  Last data filed at 2025 0757  Gross per 24 hour   Intake --   Output 600 ml   Net -600 ml     Objective:  General Appearance:  Comfortable.    Vital signs: (most recent): Blood pressure 130/81, pulse (!) 39, temperature 98.2 °F (36.8 °C), temperature source Oral, resp. rate 16, height 1.753 m (5' 9\"), weight 73.9 kg (163 lb), SpO2 95%.    Output: Producing urine.    HEENT: Normal HEENT exam.    Lungs:  Normal effort and normal respiratory rate.  Breath sounds clear to auscultation.    Heart: Bradycardia.  Regular rhythm.    Abdomen: Abdomen is soft and flat.  Bowel sounds are normal.   There is no abdominal tenderness.     Extremities: Normal range of motion.    Pulses: Distal pulses are intact.    Neurological: Patient is alert and oriented to person, place and time.    Skin:  Warm and dry.  
[facility]  Progress Note  Date:2025       Room:[facility]  Patient Name:Adin Shen     YOB: 1960     Age:64 y.o.        Subjective    Subjective:  Symptoms:  Stable.    Diet:  Adequate intake.    Activity level: Normal.       Review of Systems    Patient denies lightheadedness, dizziness, chest pain, shortness of breath.  He is resting comfortably in bed. Wife at bedside. Patient missed appointment for PET scan since he was hospitalized.    Disposition: Stable for discharge home on 2/10 versus .  Pending stress test on 2/10.    Objective         Vitals Last 24 Hours:  TEMPERATURE:  Temp  Av.1 °F (36.7 °C)  Min: 97.5 °F (36.4 °C)  Max: 98.3 °F (36.8 °C)  RESPIRATIONS RANGE: Resp  Av.7  Min: 17  Max: 18  PULSE OXIMETRY RANGE: SpO2  Av.3 %  Min: 96 %  Max: 97 %  PULSE RANGE: Pulse  Av.7  Min: 40  Max: 49  BLOOD PRESSURE RANGE: Systolic (24hrs), Av , Min:126 , Max:150     ; Diastolic (24hrs), Av, Min:71, Max:90      I/O (24Hr):    Intake/Output Summary (Last 24 hours) at 2025 1447  Last data filed at 2025 0400  Gross per 24 hour   Intake --   Output 1350 ml   Net -1350 ml     Objective:  General Appearance:  Comfortable.    Vital signs: (most recent): Blood pressure 126/89, pulse (!) 47, temperature 98.1 °F (36.7 °C), resp. rate 18, height 1.753 m (5' 9\"), weight 73.9 kg (163 lb), SpO2 96%.    Output: Producing urine.    HEENT: Normal HEENT exam.    Lungs:  Normal effort and normal respiratory rate.  Breath sounds clear to auscultation.    Heart: Bradycardia.  Regular rhythm.    Abdomen: Abdomen is soft and flat.  Bowel sounds are normal.   There is no abdominal tenderness.     Extremities: Normal range of motion.    Pulses: Distal pulses are intact.    Neurological: Patient is alert and oriented to person, place and time.    Skin:  Warm and dry.          Labs/Imaging/Diagnostics    Labs:  CBC:  Recent Labs     
[facility]  Progress Note  Date:2/10/2025       Room:[facility]  Patient Name:Adin Shen     YOB: 1960     Age:64 y.o.        Subjective    Subjective:  Symptoms:  Stable.    Diet:  Adequate intake.    Activity level: Normal.       Review of Systems    Patient is seen after stress test.  Patient denies chest pain dizziness.    Disposition: Stable for discharge home on  vs .  Pending left heart catheterization on .    Objective         Vitals Last 24 Hours:  TEMPERATURE:  Temp  Av.4 °F (36.9 °C)  Min: 98.2 °F (36.8 °C)  Max: 98.8 °F (37.1 °C)  RESPIRATIONS RANGE: Resp  Av.5  Min: 16  Max: 18  PULSE OXIMETRY RANGE: SpO2  Av.2 %  Min: 94 %  Max: 100 %  PULSE RANGE: Pulse  Av.1  Min: 36  Max: 87  BLOOD PRESSURE RANGE: Systolic (24hrs), Av , Min:115 , Max:145     ; Diastolic (24hrs), Av, Min:56, Max:82      I/O (24Hr):  No intake or output data in the 24 hours ending 02/10/25 1712    Objective:  General Appearance:  Comfortable.    Vital signs: (most recent): Blood pressure 134/65, pulse 66, temperature 98.6 °F (37 °C), temperature source Oral, resp. rate 18, height 1.753 m (5' 9\"), weight 73.9 kg (163 lb), SpO2 100%.    Output: Producing urine.    HEENT: Normal HEENT exam.    Lungs:  Normal effort and normal respiratory rate.  Breath sounds clear to auscultation.    Heart: Bradycardia.  Regular rhythm.    Abdomen: Abdomen is soft and flat.  Bowel sounds are normal.   There is no abdominal tenderness.     Extremities: Normal range of motion.    Pulses: Distal pulses are intact.    Neurological: Patient is alert and oriented to person, place and time.    Skin:  Warm and dry.          Labs/Imaging/Diagnostics    Labs:  CBC:  Recent Labs     25  0339 25  0354 02/10/25  0354   WBC 4.4* 5.2 5.7   RBC 4.69 4.77 4.62   HGB 12.3* 12.2* 11.6*   HCT 40.2 40.5 39.0   MCV 85.7 84.9 84.4   RDW 14.1 13.9 13.9   * 148 
Pts suboxone film (home medication) counted and witnessed by the pt.There are 14 strips.pt  advised that the medication needs to be taken to pharmacy as they are controlled drugs.This RN took the medication to pharmacy and counted by the pharmacy as 14 strips.form signed  and copy placed on pt's folder.The pt is aware.He also requested that he gets the suboxone films as he has been taking at home,MD on call informed and said he will discuss with the AM team.Current order is po suboxone.  
Spiritual Health History and Assessment/Progress Note  [facility]    Spiritual/Emotional Needs,  ,  ,      Name: Adin Shen MRN: [id number]    Age: 64 y.o.     Sex: male   Language: English   Cheondoism: Hoahaoism   Chest pressure     Date: 2/7/2025            Total Time Calculated: 6 min              Spiritual Assessment began in [facility] 2S TELEMETRY        Referral/Consult From: Rounding   Encounter Overview/Reason: Spiritual/Emotional Needs  Service Provided For: Patient    Olga, Belief, Meaning:   Patient has beliefs or practices that help with coping during difficult times  Family/Friends No family/friends present      Importance and Influence:  Patient has spiritual/personal beliefs that influence decisions regarding their health  Family/Friends No family/friends present    Community:  Patient feels well-supported. Support system includes: Spouse/Partner  Family/Friends No family/friends present    Assessment and Plan of Care:     Patient Interventions include: Facilitated expression of thoughts and feelings  Family/Friends Interventions include: No family/friends present    Patient Plan of Care: No spiritual needs identified for follow-up  Family/Friends Plan of Care: No family/friends present    Electronically signed by VICKY Henao on 2/7/2025 at 2:10 PM    
6.5   RBC 4.77 4.62 4.51   HGB 12.2* 11.6* 11.5*   HCT 40.5 39.0 38.5   MCV 84.9 84.4 85.4   RDW 13.9 13.9 14.1    131* 136     CHEMISTRIES:  Recent Labs     02/09/25  0354 02/10/25  0354 02/11/25  0347    144 144   K 3.8 3.3* 3.5    110 110   CO2 29 29 30   BUN 17 15 14   CREATININE 1.08 1.13 1.10   GLUCOSE 103* 85 89     PT/INR:No results for input(s): \"PROTIME\", \"INR\" in the last 72 hours.  APTT:No results for input(s): \"APTT\" in the last 72 hours.  LIVER PROFILE:  No results for input(s): \"AST\", \"ALT\", \"BILIDIR\", \"BILITOT\", \"ALKPHOS\" in the last 72 hours.      Imaging:  XR CHEST 1 VIEW    Result Date: 2/6/2025  When compared to chest radiograph 2/5/2025, there has been significant improvement in previously seen patchy right mid to lower lung airspace opacities. There is trace residual opacities seen within this region. Slight improvement in upper to mid lateral left lung opacities. Suggest short interval follow-up chest radiograph to confirm complete resolution. Electronically signed by Wan Chacon    XR CHEST PORTABLE    Result Date: 2/5/2025  Patchy right mid to lower lung airspace opacities may represent pneumonia. Electronically signed by Shawn De Oliveira      Current Medications:  Current Facility-Administered Medications: **Patient has home meds stored in the inpatient pharmacy. Please retrieve upon discharge.**, , Other, RX Placeholder  0.9 % sodium chloride infusion, , IntraVENous, Continuous  budesonide (PULMICORT) nebulizer suspension 500 mcg, 0.5 mg, Nebulization, BID RT  ipratropium (ATROVENT) 0.02 % nebulizer solution 0.5 mg, 0.5 mg, Nebulization, BID RT  sodium chloride flush 0.9 % injection 5-40 mL, 5-40 mL, IntraVENous, 2 times per day  sodium chloride flush 0.9 % injection 5-40 mL, 5-40 mL, IntraVENous, PRN  0.9 % sodium chloride infusion, , IntraVENous, PRN  potassium chloride (KLOR-CON M) extended release tablet 40 mEq, 40 mEq, Oral, PRN **OR** potassium bicarb-citric acid 
IntraVENous, PRN  enoxaparin (LOVENOX) injection 40 mg, 40 mg, SubCUTAneous, Daily  polyethylene glycol (GLYCOLAX) packet 17 g, 17 g, Oral, Daily PRN  acetaminophen (TYLENOL) tablet 650 mg, 650 mg, Oral, Q6H PRN **OR** acetaminophen (TYLENOL) suppository 650 mg, 650 mg, Rectal, Q6H PRN  promethazine (PHENERGAN) tablet 12.5 mg, 12.5 mg, Oral, Q6H PRN **OR** ondansetron (ZOFRAN) injection 4 mg, 4 mg, IntraVENous, Q6H PRN  cefTRIAXone (ROCEPHIN) 1,000 mg in sterile water 10 mL IV syringe, 1,000 mg, IntraVENous, Q24H **AND** azithromycin (ZITHROMAX) 250 mg in sodium chloride 0.9 % 250 mL IVPB, 250 mg, IntraVENous, Q24H  buprenorphine-naloxone (SUBOXONE) 8-2 MG SL tablet 1 tablet, 1 tablet, SubLINGual, BID  nicotine (NICODERM CQ) 21 MG/24HR 1 patch, 1 patch, TransDERmal, Daily  arformoterol tartrate (BROVANA) nebulizer solution 15 mcg, 15 mcg, Nebulization, BID RT  budesonide (PULMICORT) nebulizer suspension 0.5 mg, 0.5 mg, Nebulization, BID RT  hydrALAZINE (APRESOLINE) injection 5 mg, 5 mg, IntraVENous, Q4H PRN  [COMPLETED] aspirin tablet 325 mg, 325 mg, Oral, Once **FOLLOWED BY** aspirin chewable tablet 81 mg, 81 mg, Oral, Daily      Assessment//Plan           Hospital Problems             Last Modified POA    Tobacco abuse 2/6/2025 Yes    Sinus bradycardia 2/6/2025 Yes    Community acquired pneumonia of right lung 2/6/2025 Yes    Essential hypertension 2/6/2025 Yes    Normocytic anemia 2/6/2025 Yes    Elevated troponin 2/6/2025 Yes     Assessment:    Condition: In stable condition.  Unchanged.   (    #Bradycardia cardio, Pause up to 2.5 seconds during the daytime prior to admission  #Possible cardiogenic chest discomfort  #Elevated troponin  #Hypertension  #Pneumonia  #COPD/emphysema, not in exacerbation  #Tobacco abuse  #History of cocaine abuse).     Plan:   Consults: cardiology.  Administer medications as ordered.   (    - Plan for stress test on Monday  - Avoid AV tasha blockers  - Aspirin  - Nicotine patch  -

## 2025-02-12 NOTE — CARE COORDINATION
Discharge order noted for today. Orders received. No needs identified at this time. Case management remains available as needed.  Family to transport patient home at time of discharge.     Kacy KUHNN,RN, [facility]  Case Management  [phone]

## 2025-02-12 NOTE — PLAN OF CARE
Problem: Discharge Planning  Goal: Discharge to home or other facility with appropriate resources  Outcome: Progressing     Problem: Pain  Goal: Verbalizes/displays adequate comfort level or baseline comfort level  Outcome: Progressing     Problem: Safety - Adult  Goal: Free from fall injury  Outcome: Progressing     Problem: ABCDS Injury Assessment  Goal: Absence of physical injury  Outcome: Progressing     Problem: Cardiovascular - Adult  Goal: Maintains optimal cardiac output and hemodynamic stability  2/12/2025 1131 by Dru Flores RN  Outcome: Progressing  Flowsheets (Taken 2/12/2025 0744)  Maintains optimal cardiac output and hemodynamic stability: Monitor blood pressure and heart rate  2/11/2025 2236 by Joselin Irwin RN  Outcome: Progressing  Flowsheets (Taken 2/11/2025 1930)  Maintains optimal cardiac output and hemodynamic stability:   Monitor blood pressure and heart rate   Assess for signs of decreased cardiac output  Goal: Absence of cardiac dysrhythmias or at baseline  2/12/2025 1131 by Dru Flores RN  Outcome: Progressing  Flowsheets (Taken 2/12/2025 0744)  Absence of cardiac dysrhythmias or at baseline: Monitor cardiac rate and rhythm  2/11/2025 2236 by Joselin Irwin RN  Outcome: Progressing  Flowsheets (Taken 2/11/2025 1930)  Absence of cardiac dysrhythmias or at baseline: Monitor cardiac rate and rhythm     Problem: Infection - Adult  Goal: Absence of infection at discharge  Outcome: Progressing     Problem: Hematologic - Adult  Goal: Maintains hematologic stability  Outcome: Progressing

## 2025-02-13 ENCOUNTER — CLINICAL DOCUMENTATION (OUTPATIENT)
Facility: CLINIC | Age: 65
End: 2025-02-13

## 2025-02-13 NOTE — PROGRESS NOTES
PATIENT REQUIRES TCM OUTREACH    MRN: [id number]     PATIENT:  Adin Shen    ADMIT DATE:  2/6/25    DISCHARGE DATE:  2/12/25     ADMITTING DX: bradycardia requiring heart cath    MEDICATION CHANGES:   Start:   Stop: Norvasc, Amoxil, Voltaren, Advil, Commit  Change: Nicoderm CQ    SPECIALISTS:   cardiology    HOME HEALTH/WOUND CARE/PT/OT:  none    SCHEDULED FOLLOW UP APPTS:    Future Appointments   Date Time Provider Department Center   2/26/2025  9:30 AM [facility] HOME SLEEP STUDY [facility]   3/17/2025  8:15 AM Mick Velasquez MD [facility] BS AMB   5/6/2025  1:45 PM Quinn Tim DO [facility] BS AMB       *Please contact patient within 2 business days and document under .TCMOFFICE.  A scheduled Hospital Follow-up for patient within 7 days is preferred*

## 2025-02-15 NOTE — DISCHARGE SUMMARY
patient may get pacemaker in the near future.        Procedures:   2/11-left heart catheterization with patent arteries    Consults: cardiology    Imaging studies:   No results found.    Discharge Medications:     Discharge Medication List as of 2/12/2025  1:21 PM        CONTINUE these medications which have NOT CHANGED    Details   nicotine (NICODERM CQ) 14 MG/24HR Place 1 patch onto the skin daily for 28 days, Disp-28 patch, R-0Normal      nicotine (NICODERM CQ) 7 MG/24HR Place 1 patch onto the skin daily for 14 days, Disp-14 patch, R-0Normal      fluticasone-salmeterol (ADVAIR DISKUS) 500-50 MCG/ACT AEPB diskus inhaler Inhale 1 puff into the lungs in the morning and 1 puff in the evening. Rinse and gargle mouth after each use., Disp-3 each, R-3Normal      tiotropium (SPIRIVA RESPIMAT) 2.5 MCG/ACT AERS inhaler Inhale 2 puffs into the lungs daily Please load and prime for patient, Disp-3 each, R-3Normal      albuterol sulfate HFA (PROVENTIL HFA) 108 (90 Base) MCG/ACT inhaler Inhale 1-2 puffs into the lungs every 4 hours as needed for Wheezing or Shortness of Breath Can substitute any formulary approved albuterol formulation (Proventil, Proair, Ventolin, etc), Disp-1 each, R-5Normal      tadalafil (CIALIS) 10 MG tablet Take 1 tablet by mouth 30 mins before intercourse of erectile dysfunction. Do not take more than 1 tablet every 2 days, Disp-30 tablet, R-3Normal      buprenorphine-naloxone (SUBOXONE) 8-2 MG FILM SL film Place 1 Film under the tongue 2 times daily.Historical Med           STOP taking these medications       amoxicillin (AMOXIL) 500 MG capsule Comments:   Reason for Stopping:         nicotine (NICODERM CQ) 21 MG/24HR Comments:   Reason for Stopping:         diclofenac (VOLTAREN) 75 MG EC tablet Comments:   Reason for Stopping:         nicotine polacrilex (COMMIT) 2 MG lozenge Comments:   Reason for Stopping:         ibuprofen (ADVIL;MOTRIN) 600 MG tablet Comments:   Reason for Stopping:

## 2025-02-26 ENCOUNTER — HOSPITAL ENCOUNTER (OUTPATIENT)
Dept: SLEEP MEDICINE | Facility: HOSPITAL | Age: 65
Discharge: HOME | End: 2025-03-01
Attending: INTERNAL MEDICINE
Payer: COMMERCIAL

## 2025-02-26 DIAGNOSIS — R06.81 WITNESSED APNEIC SPELLS: ICD-10-CM

## 2025-02-26 DIAGNOSIS — R06.83 SNORING: ICD-10-CM

## 2025-02-26 PROCEDURE — 95800 SLP STDY UNATTENDED: CPT

## 2025-02-28 PROBLEM — G47.33 OSA (OBSTRUCTIVE SLEEP APNEA): Status: ACTIVE | Noted: 2025-02-28

## 2025-03-08 PROBLEM — R79.89 ELEVATED TROPONIN: Status: RESOLVED | Noted: 2025-02-06 | Resolved: 2025-03-08

## 2025-03-17 ENCOUNTER — OFFICE VISIT (OUTPATIENT)
Age: 65
End: 2025-03-17
Payer: COMMERCIAL

## 2025-03-17 VITALS
HEIGHT: 69 IN | BODY MASS INDEX: 25.18 KG/M2 | WEIGHT: 170 LBS | SYSTOLIC BLOOD PRESSURE: 150 MMHG | DIASTOLIC BLOOD PRESSURE: 80 MMHG | OXYGEN SATURATION: 99 % | HEART RATE: 53 BPM

## 2025-03-17 DIAGNOSIS — I10 ESSENTIAL HYPERTENSION WITH GOAL BLOOD PRESSURE LESS THAN 140/90: Primary | ICD-10-CM

## 2025-03-17 DIAGNOSIS — R00.1 BRADYCARDIA: ICD-10-CM

## 2025-03-17 PROCEDURE — 3077F SYST BP >= 140 MM HG: CPT | Performed by: INTERNAL MEDICINE

## 2025-03-17 PROCEDURE — 99406 BEHAV CHNG SMOKING 3-10 MIN: CPT | Performed by: INTERNAL MEDICINE

## 2025-03-17 PROCEDURE — 3079F DIAST BP 80-89 MM HG: CPT | Performed by: INTERNAL MEDICINE

## 2025-03-17 PROCEDURE — 99214 OFFICE O/P EST MOD 30 MIN: CPT | Performed by: INTERNAL MEDICINE

## 2025-03-17 NOTE — PROGRESS NOTES
Normal  5.7 - 6.4         Consider Prediabetes  >6.5              Consider Diabetes       Lab Results   Component Value Date    TSH 0.92 2025     : Sinus bradycardia at 42 bpm.  Right bundle branch block.  Possible LVH.    25    ECHO (TTE) COMPLETE (PRN CONTRAST/BUBBLE/STRAIN/3D) 2025  4:49 PM (Final)  Interpretation Summary    Image quality is fair.    Left Ventricle: Low normal left ventricular systolic function with a visually estimated EF of 50 - 55%. Left ventricle size is normal. Mildly increased wall thickness. Normal wall motion. Normal diastolic function for age    Right Ventricle: Right ventricle size is normal. Systolic function appears low normal    Aortic Valve: Trileaflet valve. Mild sclerosis of the aortic valve cusps. Mild regurgitation. No stenosis.    Tricuspid Valve: Trace regurgitation. The estimated RVSP is 30 mmHg.    Left Atrium: Left atrium size is normal. Left atrial volume index is normal (16-34 mL/m2).    Pericardium: There is pericardial thickening. No pericardial effusion.    IVC/SVC: IVC diameter is greater than 21 mm and decreases greater than 50% during inspiration; therefore the estimated right atrial pressure is intermediate (~8 mmHg).    Consider getting hepatic vein Doppler also.    NM STRESS TEST WITH MYOCARDIAL PERFUSION 02/10/2025  3:14 PM (Final)  Interpretation Summary    Stress Combined Conclusion: Findings suggest a moderate risk of cardiac events due to EF of 46%, however, no obvious ischemia or previous infarct.  Clinical correlation recommended.    Stress Function: Left ventricular function post-stress is abnormal. Global function is mildly reduced. Post-stress ejection fraction is 46%.    Perfusion Comments: LV perfusion is normal.    ECG: Resting ECG demonstrates sinus bradycardia and right bundle branch block.    Stress Test: A pharmacological stress test was performed using regadenoson (Lexiscan). The patient reported dyspnea and no

## 2025-04-25 ENCOUNTER — HOSPITAL ENCOUNTER (OUTPATIENT)
Facility: HOSPITAL | Age: 65
Discharge: HOME | End: 2025-04-28
Attending: INTERNAL MEDICINE
Payer: COMMERCIAL

## 2025-04-25 DIAGNOSIS — R93.89 ABNORMAL CT OF THE CHEST: ICD-10-CM

## 2025-04-25 PROCEDURE — 3430000000 HC RX DIAGNOSTIC RADIOPHARMACEUTICAL: Performed by: INTERNAL MEDICINE

## 2025-04-25 PROCEDURE — A9609 HC RX DIAGNOSTIC RADIOPHARMACEUTICAL: HCPCS | Performed by: INTERNAL MEDICINE

## 2025-04-25 PROCEDURE — A9609 HC RX DIAGNOSTIC RADIOPHARMACEUTICAL: HCPCS

## 2025-04-25 PROCEDURE — 3430000000 HC RX DIAGNOSTIC RADIOPHARMACEUTICAL

## 2025-04-25 PROCEDURE — 78816 PET IMAGE W/CT FULL BODY: CPT

## 2025-04-25 RX ORDER — FLUDEOXYGLUCOSE F-18 500 MCI/ML
10 INJECTION INTRAVENOUS
Status: COMPLETED | OUTPATIENT
Start: 2025-04-25 | End: 2025-04-25

## 2025-04-25 RX ADMIN — FLUDEOXYGLUCOSE F-18 11.1 MILLICURIE: 500 INJECTION INTRAVENOUS at 11:30

## 2025-04-29 RX ORDER — AMOXICILLIN 500 MG/1
500 CAPSULE ORAL EVERY 8 HOURS
COMMUNITY
Start: 2024-08-05

## 2025-05-06 ENCOUNTER — OFFICE VISIT (OUTPATIENT)
Age: 65
End: 2025-05-06
Payer: COMMERCIAL

## 2025-05-06 VITALS
RESPIRATION RATE: 16 BRPM | HEIGHT: 69 IN | DIASTOLIC BLOOD PRESSURE: 83 MMHG | BODY MASS INDEX: 24.08 KG/M2 | WEIGHT: 162.6 LBS | SYSTOLIC BLOOD PRESSURE: 149 MMHG | OXYGEN SATURATION: 100 % | HEART RATE: 51 BPM | TEMPERATURE: 98.8 F

## 2025-05-06 DIAGNOSIS — R91.1 PULMONARY NODULE: ICD-10-CM

## 2025-05-06 DIAGNOSIS — G47.33 OSA (OBSTRUCTIVE SLEEP APNEA): ICD-10-CM

## 2025-05-06 DIAGNOSIS — J92.0 PLEURAL PLAQUE DUE TO ASBESTOS EXPOSURE: ICD-10-CM

## 2025-05-06 DIAGNOSIS — Z72.0 TOBACCO ABUSE: ICD-10-CM

## 2025-05-06 DIAGNOSIS — J44.9 CHRONIC OBSTRUCTIVE PULMONARY DISEASE, UNSPECIFIED COPD TYPE (HCC): ICD-10-CM

## 2025-05-06 DIAGNOSIS — J43.2 CENTRILOBULAR EMPHYSEMA (HCC): Primary | ICD-10-CM

## 2025-05-06 PROCEDURE — 3077F SYST BP >= 140 MM HG: CPT | Performed by: INTERNAL MEDICINE

## 2025-05-06 PROCEDURE — 99214 OFFICE O/P EST MOD 30 MIN: CPT | Performed by: INTERNAL MEDICINE

## 2025-05-06 PROCEDURE — 99406 BEHAV CHNG SMOKING 3-10 MIN: CPT | Performed by: INTERNAL MEDICINE

## 2025-05-06 PROCEDURE — 3079F DIAST BP 80-89 MM HG: CPT | Performed by: INTERNAL MEDICINE

## 2025-05-06 RX ORDER — NICOTINE 21 MG/24HR
1 PATCH, TRANSDERMAL 24 HOURS TRANSDERMAL DAILY
Qty: 14 PATCH | Refills: 0 | Status: SHIPPED | OUTPATIENT
Start: 2025-06-18 | End: 2025-07-02

## 2025-05-06 RX ORDER — NICOTINE 21 MG/24HR
1 PATCH, TRANSDERMAL 24 HOURS TRANSDERMAL DAILY
Qty: 42 PATCH | Refills: 0 | Status: SHIPPED | OUTPATIENT
Start: 2025-05-06 | End: 2025-06-17

## 2025-05-06 NOTE — PATIENT INSTRUCTIONS
What is the plan?  -Advair - continue as prescribed  -Spiriva respimat - take 2 puffs once daily  -Start on nicotine patches and STOP use of ALL tobacco products      - Please complete CPAP lab titration    -Complete CAT/CT scan in October, 2025

## 2025-05-06 NOTE — PROGRESS NOTES
Adin Shen presents today for   Chief Complaint   Patient presents with    Back Pain    COPD    Cough       Is someone accompanying this pt? Wife    Is the patient using any DME equipment during OV? No    -DME Company No    Depression Screenin/22/2024     9:39 AM   PHQ-9 Questionaire   Little interest or pleasure in doing things 1   Feeling down, depressed, or hopeless 1   PHQ-9 Total Score 2       Learning Assessment:    Failed to redirect to the Timeline version of the [email or web address] SmartLink.    Abuse Screening:         No data to display                Fall Risk    Failed to redirect to the Timeline version of the [email or web address] SmartLink.    Coordination of Care:    1. Have you been to the ER, urgent care clinic since your last visit? Hospitalized since your last visit? No    2. Have you seen or consulted any other health care providers outside of the [facility] since your last visit? Include any pap smears or colon screening. No    Medication list has been update per patient.    
emphysematous changes at the  apices. Multiple areas of partially calcified pleural plaques, similar to prior,  consistent with prior asbestos exposure. Mild round atelectasis medial right  lower lobe. Stable pleural-parenchymal scarring right lung apex. Few small  nodules along the right minor fissure, measuring 4 mm, are stable, most likely  intrapulmonary lymph nodes. 6 mm left lower lobe nodule (3, 107) may be slightly  increased from prior, differences in technique limit assessment. 6 mm inferior  right upper lobe nodule (3, 122) is likely stable. Few smaller scattered  pulmonary nodules also appear stable.    Lower neck: Unremarkable.    Mediastinum: Mild mediastinal lymphadenopathy appears similar. For example, a  pretracheal node measures 1 cm short axis (2, 73), stable.    Cardiovascular: No significant coronary artery calcifications.    Upper abdomen: Suspect there is upper abdominal retroperitoneal adenopathy, but  not well-defined on this low-dose exam.    Chest wall: Unremarkable.    Bones: No acute osseous abnormality. Degenerative changes in the spine.    Impression  Multiple small pulmonary nodules. A 6 mm left lower lobe nodule may be slightly  increased since 2019, assessment is limited by differences in technique. Other  nodules are stable.  Lung-RADS 3 - Probably benign.  Management: Six-month low-dose CT.    Partially calcified pleural plaques are likely related to prior asbestos  exposure.    Similar mild mediastinal adenopathy.    Suspect upper abdominal retroperitoneal adenopathy, but limited assessment on  this low-dose exam. Recommend follow-up CT abdomen/pelvis with contrast for  further assessment.    Modifier S - Clinically significant or potentially clinically significant  incidental findings (non-lung cancer).     Patient Active Problem List   Diagnosis    Cardiomegaly    Chest pain, unspecified    RBBB    Primary osteoarthritis of right hip    Tobacco abuse    Arthropathy

## 2025-05-21 ENCOUNTER — TELEPHONE (OUTPATIENT)
Age: 65
End: 2025-05-21

## 2025-05-21 NOTE — TELEPHONE ENCOUNTER
Contact patient wife and gave her the number to sleep department for cpap titration study at [phone]; No other questions or concerns.

## 2025-06-20 ENCOUNTER — HOSPITAL ENCOUNTER (OUTPATIENT)
Dept: SLEEP MEDICINE | Facility: HOSPITAL | Age: 65
Discharge: HOME | End: 2025-06-23
Attending: INTERNAL MEDICINE
Payer: COMMERCIAL

## 2025-06-20 DIAGNOSIS — G47.33 OSA (OBSTRUCTIVE SLEEP APNEA): ICD-10-CM

## 2025-06-20 PROCEDURE — 95811 POLYSOM 6/>YRS CPAP 4/> PARM: CPT

## 2025-06-21 VITALS
WEIGHT: 156.25 LBS | BODY MASS INDEX: 23.14 KG/M2 | DIASTOLIC BLOOD PRESSURE: 75 MMHG | HEIGHT: 69 IN | HEART RATE: 78 BPM | SYSTOLIC BLOOD PRESSURE: 152 MMHG

## 2025-06-21 ASSESSMENT — SLEEP AND FATIGUE QUESTIONNAIRES
HOW LIKELY ARE YOU TO NOD OFF OR FALL ASLEEP WHILE SITTING QUIETLY AFTER LUNCH WITHOUT ALCOHOL: HIGH CHANCE OF DOZING
HOW LIKELY ARE YOU TO NOD OFF OR FALL ASLEEP WHILE WATCHING TV: MODERATE CHANCE OF DOZING
HOW LIKELY ARE YOU TO NOD OFF OR FALL ASLEEP WHILE SITTING AND READING: HIGH CHANCE OF DOZING
HOW LIKELY ARE YOU TO NOD OFF OR FALL ASLEEP WHILE SITTING INACTIVE IN A PUBLIC PLACE: HIGH CHANCE OF DOZING
HOW LIKELY ARE YOU TO NOD OFF OR FALL ASLEEP WHEN YOU ARE A PASSENGER IN A CAR FOR AN HOUR WITHOUT A BREAK: HIGH CHANCE OF DOZING
ESS TOTAL SCORE: 19
HOW LIKELY ARE YOU TO NOD OFF OR FALL ASLEEP WHILE LYING DOWN TO REST IN THE AFTERNOON WHEN CIRCUMSTANCES PERMIT: HIGH CHANCE OF DOZING
HOW LIKELY ARE YOU TO NOD OFF OR FALL ASLEEP IN A CAR, WHILE STOPPED FOR A FEW MINUTES IN TRAFFIC: WOULD NEVER DOZE
HOW LIKELY ARE YOU TO NOD OFF OR FALL ASLEEP WHILE SITTING AND TALKING TO SOMEONE: MODERATE CHANCE OF DOZING

## 2025-07-11 PROBLEM — G47.31 PRIMARY CENTRAL SLEEP APNEA: Status: ACTIVE | Noted: 2025-07-11

## 2025-07-15 NOTE — PROGRESS NOTES
Colon Screen    Patient: Adin Shen MRN: [id number]  SSN: [id number]    YOB: 1960  Age: 64 y.o.  Sex: male        Subjective:   Adin Shen was referred by PCP. Dr. Patel.  Patient referred for colonoscopy for   Screening colonoscopy. Patient denies rectal pain or bleeding.  Abdominal surgeries as described below, specifically none.  Family history as described below, specifically none. Patient's last colonoscopy 7-8 years ago.    Allergies   Allergen Reactions    Amoxicillin-Pot Clavulanate      Other Reaction(s): Unknown    Diclofenac Swelling       Past Medical History:   Diagnosis Date    Acid indigestion     Bell's palsy     Cardiomegaly     1/12 LVH on EKG    Chronic back pain     Cocaine abuse (HCC)     Drug use     GERD (gastroesophageal reflux disease)     Heroin causing adverse effect in therapeutic use     Nonspecific abnormal electrocardiogram (ECG) (EKG)     S Jcarlos, RBBB    Pulmonary nodule     Substance abuse (HCC)     Tobacco use disorder      Past Surgical History:   Procedure Laterality Date    CARDIAC PROCEDURE N/A 2/11/2025    Left heart cath / coronary angiography performed by Devin Griffith MD at [facility] CARDIAC CATH LAB    CIRCUMCISION      CIRCUMCISION      ORCHIECTOMY Left     OTHER SURGICAL HISTORY  2007 approx    lung biopsy      Family History   Problem Relation Age of Onset    Hypertension Father     Heart Disease Father     Cancer Father     Alcohol Abuse Father     High Blood Pressure Father     Hypertension Sister     High Cholesterol Sister     Hypertension Brother     Hypertension Sister     Diabetes Sister     Substance Abuse Sister     Diabetes Mother     Hypertension Mother     High Cholesterol Mother     Thyroid Disease Mother     Heart Attack Mother     High Blood Pressure Mother     Vision Loss Mother      Social History     Tobacco Use    Smoking status: Every Day     Current packs/day: 1.00     Average packs/day: 1 pack/day for 47.6 years

## 2025-07-16 ENCOUNTER — HOSPITAL ENCOUNTER (OUTPATIENT)
Age: 65
Discharge: HOME | End: 2025-07-16
Payer: MEDICAID

## 2025-07-16 ENCOUNTER — CLINICAL SUPPORT (OUTPATIENT)
Age: 65
End: 2025-07-16

## 2025-07-16 VITALS
RESPIRATION RATE: 17 BRPM | BODY MASS INDEX: 23.55 KG/M2 | HEIGHT: 69 IN | WEIGHT: 159 LBS | OXYGEN SATURATION: 97 % | HEART RATE: 50 BPM | TEMPERATURE: 97.1 F

## 2025-07-16 DIAGNOSIS — Z12.11 COLON CANCER SCREENING: Primary | ICD-10-CM

## 2025-07-16 DIAGNOSIS — I10 ESSENTIAL HYPERTENSION: ICD-10-CM

## 2025-07-16 LAB
ALBUMIN SERPL-MCNC: 3.5 G/DL (ref 3.4–5)
ALBUMIN/GLOB SERPL: 1 (ref 0.8–1.7)
ALP SERPL-CCNC: 84 U/L (ref 45–117)
ALT SERPL-CCNC: 24 U/L (ref 10–50)
ANION GAP SERPL CALC-SCNC: 10 MMOL/L (ref 3–18)
AST SERPL-CCNC: 40 U/L (ref 10–38)
BILIRUB SERPL-MCNC: 0.6 MG/DL (ref 0.2–1)
BUN SERPL-MCNC: 26 MG/DL (ref 6–23)
BUN/CREAT SERPL: 16 (ref 12–20)
CALCIUM SERPL-MCNC: 9.6 MG/DL (ref 8.5–10.1)
CHLORIDE SERPL-SCNC: 104 MMOL/L (ref 98–107)
CO2 SERPL-SCNC: 28 MMOL/L (ref 21–32)
CREAT SERPL-MCNC: 1.67 MG/DL (ref 0.6–1.3)
EKG ATRIAL RATE: 40 BPM
EKG DIAGNOSIS: NORMAL
EKG P AXIS: 68 DEGREES
EKG P-R INTERVAL: 176 MS
EKG Q-T INTERVAL: 490 MS
EKG QRS DURATION: 148 MS
EKG QTC CALCULATION (BAZETT): 399 MS
EKG R AXIS: 72 DEGREES
EKG T AXIS: 60 DEGREES
EKG VENTRICULAR RATE: 40 BPM
GLOBULIN SER CALC-MCNC: 3.6 G/DL (ref 2–4)
GLUCOSE SERPL-MCNC: 68 MG/DL (ref 74–108)
POTASSIUM SERPL-SCNC: 4.3 MMOL/L (ref 3.5–5.5)
PROT SERPL-MCNC: 7.2 G/DL (ref 6.4–8.2)
SODIUM SERPL-SCNC: 142 MMOL/L (ref 136–145)

## 2025-07-16 PROCEDURE — 93005 ELECTROCARDIOGRAM TRACING: CPT

## 2025-07-16 PROCEDURE — 80053 COMPREHEN METABOLIC PANEL: CPT

## 2025-07-16 PROCEDURE — 36415 COLL VENOUS BLD VENIPUNCTURE: CPT

## 2025-07-16 RX ORDER — METOPROLOL SUCCINATE 25 MG/1
TABLET, EXTENDED RELEASE ORAL
COMMUNITY
Start: 2025-06-23 | End: 2025-07-16

## 2025-07-16 RX ORDER — METOPROLOL SUCCINATE 25 MG/1
25 TABLET, EXTENDED RELEASE ORAL DAILY
COMMUNITY

## 2025-07-16 RX ORDER — AMLODIPINE BESYLATE 10 MG/1
TABLET ORAL
COMMUNITY
Start: 2025-06-23

## 2025-07-17 PROBLEM — Z12.11 COLON CANCER SCREENING: Status: ACTIVE | Noted: 2025-07-17

## 2025-07-24 DIAGNOSIS — J44.9 COPD, GROUP B, BY GOLD 2017 CLASSIFICATION (HCC): ICD-10-CM

## 2025-07-24 RX ORDER — FLUTICASONE PROPIONATE AND SALMETEROL 500; 50 UG/1; UG/1
1 POWDER RESPIRATORY (INHALATION) EVERY 12 HOURS
Qty: 3 EACH | Refills: 3 | Status: SHIPPED | OUTPATIENT
Start: 2025-07-24

## 2025-07-24 RX ORDER — ALBUTEROL SULFATE 90 UG/1
1-2 INHALANT RESPIRATORY (INHALATION) EVERY 4 HOURS PRN
Qty: 1 EACH | Refills: 5 | Status: SHIPPED | OUTPATIENT
Start: 2025-07-24

## 2025-07-28 DIAGNOSIS — G47.31 CENTRAL SLEEP APNEA: ICD-10-CM

## 2025-07-28 DIAGNOSIS — G47.33 OSA (OBSTRUCTIVE SLEEP APNEA): Primary | ICD-10-CM

## 2025-07-30 ENCOUNTER — TELEPHONE (OUTPATIENT)
Age: 65
End: 2025-07-30

## 2025-07-30 NOTE — TELEPHONE ENCOUNTER
Spoke with the wife and advised cpap has been ordered gave her the contact info for [employer] and advised he needs appt in 60days after the start of the machine

## 2025-07-30 NOTE — TELEPHONE ENCOUNTER
----- Message from Dr. Quinn Tim DO sent at 7/28/2025  5:23 PM EDT -----  Regarding: RE: sleep study  Please let patient know that order for CPAP/bilevel has been placed.  Have patient follow-up with me in the clinic within 60 days of starting therapy.  ----- Message -----  From: Emmy Kyle  Sent: 7/28/2025  10:13 AM EDT  To: Quinn Tim DO  Subject: sleep study                                      Sleep Study results are now in Epic.

## 2025-08-07 ENCOUNTER — HOSPITAL ENCOUNTER (OUTPATIENT)
Facility: HOSPITAL | Age: 65
Discharge: HOME | End: 2025-08-10
Payer: MEDICAID

## 2025-08-07 DIAGNOSIS — Z87.891 PERSONAL HISTORY OF TOBACCO USE, PRESENTING HAZARDS TO HEALTH: ICD-10-CM

## 2025-08-07 PROCEDURE — 71271 CT THORAX LUNG CANCER SCR C-: CPT

## 2025-08-16 PROBLEM — Z12.11 COLON CANCER SCREENING: Status: RESOLVED | Noted: 2025-07-17 | Resolved: 2025-08-16

## (undated) DEVICE — ENDOSCOPY PUMP TUBING/ CAP SET: Brand: ERBE

## (undated) DEVICE — SNARE POLYP M W27MMXL240CM OVL STIFF DISP CAPTIVATOR

## (undated) DEVICE — ANGIOGRAPHY KIT CUST VASC

## (undated) DEVICE — COVER US PRB W15XL120CM W/ GEL RUBBERBAND TAPE STRP FLD GEN

## (undated) DEVICE — LINER SUCT CANSTR 3000CC PLAS SFT PRE ASSEMB W/OUT TBNG W/

## (undated) DEVICE — FORCEPS BX L240CM JAW DIA2.8MM L CAP W/ NDL MIC MESH TOOTH

## (undated) DEVICE — LUER-LOK 360°: Brand: CONNECTA, LUER-LOK

## (undated) DEVICE — DECANTER BAG 9": Brand: MEDLINE INDUSTRIES, INC.

## (undated) DEVICE — GAUZE,SPONGE,4"X4",16PLY,STRL,LF,10/TRAY: Brand: MEDLINE

## (undated) DEVICE — PROCEDURE KIT FLUID MGMT 10 FR CUST MAINFOLD

## (undated) DEVICE — Device

## (undated) DEVICE — SUPPORT WRST COMPR W/ HK MBRACE

## (undated) DEVICE — DRAPE,ANGIO,BRACH,STERILE,38X44: Brand: MEDLINE

## (undated) DEVICE — GOWN ISOL IMPERV UNIV, DISP, OPEN BACK, BLUE --

## (undated) DEVICE — FLUFF AND POLYMER UNDERPAD,EXTRA HEAVY: Brand: WINGS

## (undated) DEVICE — GUIDEWIRE VASC L260CM DIA0.035IN RAD 3MM J TIP L7CM PTFE

## (undated) DEVICE — CATHETER SUCT TR FL TIP 14FR W/ O CTRL

## (undated) DEVICE — CANNULA ORIG TL CLR W FOAM CUSHIONS AND 14FT SUPL TB 3 CHN

## (undated) DEVICE — EXTENSION SET, MALE LUER LOCK ADAPTER

## (undated) DEVICE — PADS  DEFIB  ADULT

## (undated) DEVICE — BAG WST COLL CLR DISP PVC W/ ROTICULATING LUER L BOR TBNG

## (undated) DEVICE — SYR 20ML LL STRL LF --

## (undated) DEVICE — SET FLD ADMIN 3 W STPCOCK FIX FEM L BOR 1IN

## (undated) DEVICE — SYRINGE MED 25GA 3ML L5/8IN SUBQ PLAS W/ DETACH NDL SFTY

## (undated) DEVICE — CANNULA NSL AD TBNG L14FT STD PVC O2 CRV CONN NONFLARED NSL

## (undated) DEVICE — SYR 50ML SLIP TIP NSAF LF STRL --

## (undated) DEVICE — CATHETER ANGIO JL4 0.045 INX5 FRX100 CM THRULUMEN EXPO

## (undated) DEVICE — ADAPTER ANGIO CLR BODY POLYCARB AIRLESS ROT M 2ND FEM LUER

## (undated) DEVICE — SOLUTION IRRIG 1000ML H2O STRL BLT

## (undated) DEVICE — CATHETER DIAG 5FR L100CM LUMN ID0.047IN JL3.5 CRV 0 SIDE H

## (undated) DEVICE — MEDI-VAC NON-CONDUCTIVE SUCTION TUBING: Brand: CARDINAL HEALTH

## (undated) DEVICE — YANKAUER,SMOOTH HANDLE,HIGH CAPACITY: Brand: MEDLINE INDUSTRIES, INC.

## (undated) DEVICE — CATHETER ANGIO 5FR L100CM GRY S STL NYL JR4 3 SEG BRAID L

## (undated) DEVICE — TOWEL,OR,DSP,ST,BLUE,STD,4/PK,20PK/CS: Brand: MEDLINE

## (undated) DEVICE — SYR 10ML LUER LOK 1/5ML GRAD --

## (undated) DEVICE — BAND COMPR L24CM REG CLR PLAS HEMSTAT EXT HK AND LOOP RETEN

## (undated) DEVICE — PRESSURE MONITORING SET: Brand: TRUWAVE